# Patient Record
Sex: FEMALE | Race: WHITE | HISPANIC OR LATINO | Employment: UNEMPLOYED | URBAN - METROPOLITAN AREA
[De-identification: names, ages, dates, MRNs, and addresses within clinical notes are randomized per-mention and may not be internally consistent; named-entity substitution may affect disease eponyms.]

---

## 2019-10-01 PROBLEM — M47.817 LUMBOSACRAL SPONDYLOSIS WITHOUT MYELOPATHY: Status: ACTIVE | Noted: 2019-10-01

## 2019-10-01 PROBLEM — M77.32 HEEL SPUR, LEFT: Status: ACTIVE | Noted: 2019-10-01

## 2023-03-18 DIAGNOSIS — E11.9 TYPE 2 DIABETES MELLITUS WITHOUT COMPLICATION, WITH LONG-TERM CURRENT USE OF INSULIN (MULTI): Primary | ICD-10-CM

## 2023-03-18 DIAGNOSIS — Z79.4 TYPE 2 DIABETES MELLITUS WITHOUT COMPLICATION, WITH LONG-TERM CURRENT USE OF INSULIN (MULTI): Primary | ICD-10-CM

## 2023-03-20 PROBLEM — S22.000A COMPRESSION OF THORACIC VERTEBRA (MULTI): Status: ACTIVE | Noted: 2023-03-20

## 2023-03-20 PROBLEM — E66.9 OBESITY: Status: ACTIVE | Noted: 2023-03-20

## 2023-03-20 PROBLEM — R94.30 ABNORMAL RESULTS OF CARDIOVASCULAR FUNCTION STUDIES: Status: ACTIVE | Noted: 2023-03-20

## 2023-03-20 PROBLEM — M54.50 CHRONIC BILATERAL LOW BACK PAIN WITHOUT SCIATICA: Status: ACTIVE | Noted: 2023-03-20

## 2023-03-20 PROBLEM — I25.10 CAD (CORONARY ARTERY DISEASE): Status: ACTIVE | Noted: 2023-03-20

## 2023-03-20 PROBLEM — M19.90 OSTEOARTHRITIS: Status: ACTIVE | Noted: 2023-03-20

## 2023-03-20 PROBLEM — E78.5 HYPERLIPIDEMIA: Status: ACTIVE | Noted: 2023-03-20

## 2023-03-20 PROBLEM — E11.9 DIABETES MELLITUS (MULTI): Status: ACTIVE | Noted: 2023-03-20

## 2023-03-20 PROBLEM — G62.9 PERIPHERAL NEUROPATHY: Status: ACTIVE | Noted: 2023-03-20

## 2023-03-20 PROBLEM — R07.9 CHEST PAIN: Status: ACTIVE | Noted: 2023-03-20

## 2023-03-20 PROBLEM — R06.00 DYSPNEA: Status: ACTIVE | Noted: 2023-03-20

## 2023-03-20 PROBLEM — E11.40 DIABETIC NEUROPATHY (MULTI): Status: ACTIVE | Noted: 2023-03-20

## 2023-03-20 PROBLEM — I10 HYPERTENSION: Status: ACTIVE | Noted: 2023-03-20

## 2023-03-20 PROBLEM — M54.16 LUMBAR RADICULOPATHY: Status: ACTIVE | Noted: 2023-03-20

## 2023-03-20 PROBLEM — G89.29 CHRONIC BILATERAL LOW BACK PAIN WITHOUT SCIATICA: Status: ACTIVE | Noted: 2023-03-20

## 2023-03-20 PROBLEM — Z91.199 NONCOMPLIANCE: Status: ACTIVE | Noted: 2023-03-20

## 2023-03-20 PROBLEM — R93.1 ABNORMAL SCREENING CARDIAC CT: Status: ACTIVE | Noted: 2023-03-20

## 2023-03-20 PROBLEM — K27.9 PEPTIC ULCER: Status: ACTIVE | Noted: 2023-03-20

## 2023-03-20 RX ORDER — GLIMEPIRIDE 2 MG/1
1 TABLET ORAL DAILY
COMMUNITY
Start: 2019-05-20 | End: 2023-06-14

## 2023-03-20 RX ORDER — BLOOD SUGAR DIAGNOSTIC
STRIP MISCELLANEOUS
COMMUNITY
Start: 2019-06-03

## 2023-03-20 RX ORDER — PRAVASTATIN SODIUM 20 MG/1
1 TABLET ORAL NIGHTLY
COMMUNITY
End: 2024-01-29 | Stop reason: SDUPTHER

## 2023-03-20 RX ORDER — ATORVASTATIN CALCIUM 10 MG/1
1 TABLET, FILM COATED ORAL DAILY
COMMUNITY
End: 2023-04-04

## 2023-03-20 RX ORDER — PNV NO.95/FERROUS FUM/FOLIC AC 28MG-0.8MG
1 TABLET ORAL DAILY
COMMUNITY

## 2023-03-20 RX ORDER — GLIMEPIRIDE 2 MG/1
TABLET ORAL
Qty: 90 TABLET | Refills: 1 | OUTPATIENT
Start: 2023-03-20

## 2023-03-20 RX ORDER — NAPROXEN SODIUM 220 MG/1
1 TABLET, FILM COATED ORAL DAILY
COMMUNITY

## 2023-03-20 RX ORDER — LISINOPRIL 5 MG/1
1 TABLET ORAL 2 TIMES DAILY
COMMUNITY
Start: 2020-06-26 | End: 2023-05-16

## 2023-04-03 PROBLEM — M25.512 LEFT SHOULDER PAIN: Status: ACTIVE | Noted: 2023-04-03

## 2023-04-03 PROBLEM — S69.90XA WRIST INJURY: Status: ACTIVE | Noted: 2023-04-03

## 2023-04-03 RX ORDER — LANCETS
EACH MISCELLANEOUS
COMMUNITY

## 2023-04-04 ENCOUNTER — OFFICE VISIT (OUTPATIENT)
Dept: PRIMARY CARE | Facility: CLINIC | Age: 83
End: 2023-04-04
Payer: MEDICARE

## 2023-04-04 ENCOUNTER — LAB (OUTPATIENT)
Dept: LAB | Facility: LAB | Age: 83
End: 2023-04-04
Payer: MEDICARE

## 2023-04-04 VITALS — WEIGHT: 123.5 LBS | DIASTOLIC BLOOD PRESSURE: 66 MMHG | SYSTOLIC BLOOD PRESSURE: 118 MMHG | BODY MASS INDEX: 26.73 KG/M2

## 2023-04-04 DIAGNOSIS — E11.51 TYPE 2 DIABETES MELLITUS WITH DIABETIC PERIPHERAL ANGIOPATHY WITHOUT GANGRENE, WITHOUT LONG-TERM CURRENT USE OF INSULIN (MULTI): ICD-10-CM

## 2023-04-04 DIAGNOSIS — E83.51 HYPOCALCEMIA: ICD-10-CM

## 2023-04-04 DIAGNOSIS — M15.9 PRIMARY OSTEOARTHRITIS INVOLVING MULTIPLE JOINTS: ICD-10-CM

## 2023-04-04 DIAGNOSIS — G61.81 CHRONIC INFLAMMATORY DEMYELINATING POLYNEUROPATHY (MULTI): Primary | ICD-10-CM

## 2023-04-04 DIAGNOSIS — E84.8: ICD-10-CM

## 2023-04-04 DIAGNOSIS — G63: ICD-10-CM

## 2023-04-04 DIAGNOSIS — L30.9 ECZEMA, UNSPECIFIED TYPE: ICD-10-CM

## 2023-04-04 DIAGNOSIS — E11.49 OTHER DIABETIC NEUROLOGICAL COMPLICATION ASSOCIATED WITH TYPE 2 DIABETES MELLITUS (MULTI): ICD-10-CM

## 2023-04-04 DIAGNOSIS — I25.10 CORONARY ARTERY DISEASE INVOLVING NATIVE CORONARY ARTERY OF NATIVE HEART WITHOUT ANGINA PECTORIS: ICD-10-CM

## 2023-04-04 DIAGNOSIS — Z91.199 NONCOMPLIANCE: ICD-10-CM

## 2023-04-04 DIAGNOSIS — E78.41 ELEVATED LIPOPROTEIN(A): ICD-10-CM

## 2023-04-04 PROBLEM — M77.32 HEEL SPUR, LEFT: Status: ACTIVE | Noted: 2019-10-01

## 2023-04-04 PROBLEM — M47.817 LUMBOSACRAL SPONDYLOSIS WITHOUT MYELOPATHY: Status: ACTIVE | Noted: 2019-10-01

## 2023-04-04 LAB
ALANINE AMINOTRANSFERASE (SGPT) (U/L) IN SER/PLAS: 16 U/L (ref 7–45)
ALBUMIN (G/DL) IN SER/PLAS: 3.7 G/DL (ref 3.4–5)
ALKALINE PHOSPHATASE (U/L) IN SER/PLAS: 74 U/L (ref 33–136)
ANION GAP IN SER/PLAS: 12 MMOL/L (ref 10–20)
ASPARTATE AMINOTRANSFERASE (SGOT) (U/L) IN SER/PLAS: 24 U/L (ref 9–39)
BILIRUBIN TOTAL (MG/DL) IN SER/PLAS: 0.6 MG/DL (ref 0–1.2)
CALCIDIOL (25 OH VITAMIN D3) (NG/ML) IN SER/PLAS: 10 NG/ML
CALCIUM (MG/DL) IN SER/PLAS: 9.4 MG/DL (ref 8.6–10.3)
CARBON DIOXIDE, TOTAL (MMOL/L) IN SER/PLAS: 29 MMOL/L (ref 21–32)
CHLORIDE (MMOL/L) IN SER/PLAS: 103 MMOL/L (ref 98–107)
CHOLESTEROL (MG/DL) IN SER/PLAS: 143 MG/DL (ref 0–199)
CHOLESTEROL IN HDL (MG/DL) IN SER/PLAS: 42.5 MG/DL
CHOLESTEROL/HDL RATIO: 3.4
CREATININE (MG/DL) IN SER/PLAS: 0.73 MG/DL (ref 0.5–1.05)
ESTIMATED AVERAGE GLUCOSE FOR HBA1C: 189 MG/DL
GFR FEMALE: 82 ML/MIN/1.73M2
GLUCOSE (MG/DL) IN SER/PLAS: 147 MG/DL (ref 74–99)
HEMOGLOBIN A1C/HEMOGLOBIN TOTAL IN BLOOD: 8.2 %
LDL: 79 MG/DL (ref 0–99)
POTASSIUM (MMOL/L) IN SER/PLAS: 4.3 MMOL/L (ref 3.5–5.3)
PROTEIN TOTAL: 6.7 G/DL (ref 6.4–8.2)
SODIUM (MMOL/L) IN SER/PLAS: 140 MMOL/L (ref 136–145)
THYROTROPIN (MIU/L) IN SER/PLAS BY DETECTION LIMIT <= 0.05 MIU/L: 1.24 MIU/L (ref 0.44–3.98)
TRIGLYCERIDE (MG/DL) IN SER/PLAS: 106 MG/DL (ref 0–149)
UREA NITROGEN (MG/DL) IN SER/PLAS: 17 MG/DL (ref 6–23)
VLDL: 21 MG/DL (ref 0–40)

## 2023-04-04 PROCEDURE — 83036 HEMOGLOBIN GLYCOSYLATED A1C: CPT

## 2023-04-04 PROCEDURE — 99213 OFFICE O/P EST LOW 20 MIN: CPT | Performed by: FAMILY MEDICINE

## 2023-04-04 PROCEDURE — 82570 ASSAY OF URINE CREATININE: CPT

## 2023-04-04 PROCEDURE — 80053 COMPREHEN METABOLIC PANEL: CPT

## 2023-04-04 PROCEDURE — 3078F DIAST BP <80 MM HG: CPT | Performed by: FAMILY MEDICINE

## 2023-04-04 PROCEDURE — 36415 COLL VENOUS BLD VENIPUNCTURE: CPT

## 2023-04-04 PROCEDURE — 1159F MED LIST DOCD IN RCRD: CPT | Performed by: FAMILY MEDICINE

## 2023-04-04 PROCEDURE — 84443 ASSAY THYROID STIM HORMONE: CPT

## 2023-04-04 PROCEDURE — 1160F RVW MEDS BY RX/DR IN RCRD: CPT | Performed by: FAMILY MEDICINE

## 2023-04-04 PROCEDURE — 82306 VITAMIN D 25 HYDROXY: CPT

## 2023-04-04 PROCEDURE — 80061 LIPID PANEL: CPT

## 2023-04-04 PROCEDURE — 82043 UR ALBUMIN QUANTITATIVE: CPT

## 2023-04-04 PROCEDURE — 3074F SYST BP LT 130 MM HG: CPT | Performed by: FAMILY MEDICINE

## 2023-04-04 RX ORDER — BETAMETHASONE VALERATE 1 MG/G
CREAM TOPICAL 2 TIMES DAILY
Qty: 45 G | Refills: 5 | Status: SHIPPED | OUTPATIENT
Start: 2023-04-04 | End: 2024-04-03

## 2023-04-04 ASSESSMENT — ENCOUNTER SYMPTOMS
LIGHT-HEADEDNESS: 1
MUSCULOSKELETAL NEGATIVE: 1
FREQUENCY: 1
CONSTITUTIONAL NEGATIVE: 1
GASTROINTESTINAL NEGATIVE: 1
CARDIOVASCULAR NEGATIVE: 1
RESPIRATORY NEGATIVE: 1
ENDOCRINE NEGATIVE: 1
CONFUSION: 1
ALLERGIC/IMMUNOLOGIC NEGATIVE: 1

## 2023-04-04 NOTE — PROGRESS NOTES
Subjective   Reason for Visit: Marci Han is an 82 y.o. female here for a Medicare Wellness visit.     Past Medical, Surgical, and Family History reviewed and updated in chart.    Reviewed all medications by prescribing practitioner or clinical pharmacist (such as prescriptions, OTCs, herbal therapies and supplements) and documented in the medical record.    RO        Patient Care Team:  Frances Azar MD as PCP - [facility]  Frances Azar MD as PCP - [employer] PCP     Review of Systems   Constitutional: Negative.    HENT: Negative.     Eyes:  Positive for visual disturbance.   Respiratory: Negative.     Cardiovascular: Negative.    Gastrointestinal: Negative.    Endocrine: Negative.    Genitourinary:  Positive for frequency and urgency.   Musculoskeletal: Negative.    Skin:  Rash: Ext. Wound: rt ankle.   Allergic/Immunologic: Negative.    Neurological:  Positive for light-headedness.   Psychiatric/Behavioral:  Positive for confusion. Negative for behavioral problems.        Objective   Vitals:  /66 (BP Location: Right arm, Patient Position: Sitting)   Wt 56 kg (123 lb 8 oz)   BMI 26.73 kg/m²       Physical Exam  Constitutional:       Appearance: Normal appearance.   HENT:      Head: Normocephalic and atraumatic.      Nose: Nose normal.      Mouth/Throat:      Mouth: Mucous membranes are moist.   Cardiovascular:      Rate and Rhythm: Normal rate and regular rhythm.   Pulmonary:      Effort: Pulmonary effort is normal.   Musculoskeletal:         General: Normal range of motion.      Cervical back: Normal range of motion and neck supple.   Skin:     General: Skin is warm.      Findings: Rash: EXT.   Neurological:      General: No focal deficit present.      Mental Status: She is alert and oriented to person, place, and time.   Psychiatric:         Mood and Affect: Mood normal.         Behavior: Behavior normal.         Assessment/Plan   Problem List Items Addressed This  Visit       CAD (coronary artery disease)    Diabetes mellitus (CMS/HCC)    Diabetic neuropathy (CMS/HCC)    Hyperlipidemia    Noncompliance    Osteoarthritis    Peripheral neuropathy - Primary

## 2023-04-04 NOTE — PATIENT INSTRUCTIONS
Contd meds , diet , daily X's ,monitor BP/BS daily , FUWOD's , oph / pod , labs , Rx , discussed with daughter,., tcb x 1wk , refuses mamogram , routine skin care,. Will do neuro eval next visit @ lab reports

## 2023-04-05 LAB
ALBUMIN (MG/L) IN URINE: 7.1 MG/L
ALBUMIN/CREATININE (UG/MG) IN URINE: 6 UG/MG CRT (ref 0–30)
CREATININE (MG/DL) IN URINE: 118 MG/DL (ref 20–320)

## 2023-04-06 DIAGNOSIS — E11.51 TYPE 2 DIABETES MELLITUS WITH DIABETIC PERIPHERAL ANGIOPATHY WITHOUT GANGRENE, WITHOUT LONG-TERM CURRENT USE OF INSULIN (MULTI): Primary | ICD-10-CM

## 2023-04-06 DIAGNOSIS — E55.9 VITAMIN D DEFICIENCY: Primary | ICD-10-CM

## 2023-04-06 RX ORDER — ERGOCALCIFEROL 1.25 MG/1
50000 CAPSULE ORAL
Qty: 12 CAPSULE | Refills: 0 | Status: SHIPPED | OUTPATIENT
Start: 2023-04-06 | End: 2023-05-04 | Stop reason: ALTCHOICE

## 2023-05-04 ENCOUNTER — OFFICE VISIT (OUTPATIENT)
Dept: PRIMARY CARE | Facility: CLINIC | Age: 83
End: 2023-05-04
Payer: MEDICARE

## 2023-05-04 VITALS
HEIGHT: 59 IN | DIASTOLIC BLOOD PRESSURE: 58 MMHG | WEIGHT: 123 LBS | SYSTOLIC BLOOD PRESSURE: 126 MMHG | BODY MASS INDEX: 24.8 KG/M2

## 2023-05-04 DIAGNOSIS — I25.10 CORONARY ARTERY DISEASE INVOLVING NATIVE CORONARY ARTERY OF NATIVE HEART WITHOUT ANGINA PECTORIS: ICD-10-CM

## 2023-05-04 DIAGNOSIS — E11.51 TYPE 2 DIABETES MELLITUS WITH DIABETIC PERIPHERAL ANGIOPATHY WITHOUT GANGRENE, WITHOUT LONG-TERM CURRENT USE OF INSULIN (MULTI): ICD-10-CM

## 2023-05-04 DIAGNOSIS — Z00.00 HEALTHCARE MAINTENANCE: ICD-10-CM

## 2023-05-04 DIAGNOSIS — E11.43 DIABETIC AUTONOMIC NEUROPATHY ASSOCIATED WITH TYPE 2 DIABETES MELLITUS (MULTI): Primary | ICD-10-CM

## 2023-05-04 DIAGNOSIS — E66.09 CLASS 1 OBESITY DUE TO EXCESS CALORIES IN ADULT, UNSPECIFIED BMI, UNSPECIFIED WHETHER SERIOUS COMORBIDITY PRESENT: ICD-10-CM

## 2023-05-04 DIAGNOSIS — I10 PRIMARY HYPERTENSION: ICD-10-CM

## 2023-05-04 DIAGNOSIS — E78.41 ELEVATED LIPOPROTEIN(A): ICD-10-CM

## 2023-05-04 DIAGNOSIS — M15.9 PRIMARY OSTEOARTHRITIS INVOLVING MULTIPLE JOINTS: ICD-10-CM

## 2023-05-04 DIAGNOSIS — G61.81 CHRONIC INFLAMMATORY DEMYELINATING POLYNEUROPATHY (MULTI): ICD-10-CM

## 2023-05-04 DIAGNOSIS — Z12.31 ENCOUNTER FOR SCREENING MAMMOGRAM FOR MALIGNANT NEOPLASM OF BREAST: ICD-10-CM

## 2023-05-04 PROCEDURE — 1159F MED LIST DOCD IN RCRD: CPT | Performed by: FAMILY MEDICINE

## 2023-05-04 PROCEDURE — 99213 OFFICE O/P EST LOW 20 MIN: CPT | Performed by: FAMILY MEDICINE

## 2023-05-04 PROCEDURE — 3078F DIAST BP <80 MM HG: CPT | Performed by: FAMILY MEDICINE

## 2023-05-04 PROCEDURE — 1160F RVW MEDS BY RX/DR IN RCRD: CPT | Performed by: FAMILY MEDICINE

## 2023-05-04 PROCEDURE — 1036F TOBACCO NON-USER: CPT | Performed by: FAMILY MEDICINE

## 2023-05-04 PROCEDURE — 3074F SYST BP LT 130 MM HG: CPT | Performed by: FAMILY MEDICINE

## 2023-05-04 RX ORDER — ENALAPRIL MALEATE 5 MG/1
TABLET ORAL
COMMUNITY
Start: 2018-09-07 | End: 2023-07-06 | Stop reason: ALTCHOICE

## 2023-05-04 ASSESSMENT — ENCOUNTER SYMPTOMS
NUMBNESS: 1
ENDOCRINE NEGATIVE: 1
RESPIRATORY NEGATIVE: 1
FREQUENCY: 1
BACK PAIN: 1
CARDIOVASCULAR NEGATIVE: 1
PSYCHIATRIC NEGATIVE: 1
WEAKNESS: 1
CONSTITUTIONAL NEGATIVE: 1
GASTROINTESTINAL NEGATIVE: 1
ALLERGIC/IMMUNOLOGIC NEGATIVE: 1
LIGHT-HEADEDNESS: 1
ARTHRALGIAS: 1
HEMATOLOGIC/LYMPHATIC NEGATIVE: 1

## 2023-05-04 NOTE — PROGRESS NOTES
"Subjective   Patient ID: Marci Han is a 83 y.o. female who presents for Follow-up (Patient presented today for medication management.).    ROV         Review of Systems   Constitutional: Negative.    HENT: Negative.     Eyes:  Positive for visual disturbance.   Respiratory: Negative.     Cardiovascular: Negative.    Gastrointestinal: Negative.    Endocrine: Negative.    Genitourinary:  Positive for frequency.   Musculoskeletal:  Positive for arthralgias and back pain.   Skin: Negative.    Allergic/Immunologic: Negative.    Neurological:  Positive for weakness, light-headedness and numbness.   Hematological: Negative.    Psychiatric/Behavioral: Negative.         Objective   /58   Ht 1.499 m (4' 11\")   Wt 55.8 kg (123 lb)   BMI 24.84 kg/m²     Physical Exam  HENT:      Head: Normocephalic.      Nose: Nose normal.   Eyes:      Extraocular Movements: Extraocular movements intact.      Pupils: Pupils are equal, round, and reactive to light.   Cardiovascular:      Rate and Rhythm: Normal rate and regular rhythm.   Pulmonary:      Effort: Pulmonary effort is normal.   Abdominal:      Palpations: Abdomen is soft.   Musculoskeletal:         General: Normal range of motion.      Cervical back: Normal range of motion and neck supple.   Skin:     Findings: Rash: eczema.   Neurological:      General: No focal deficit present.      Mental Status: She is alert and oriented to person, place, and time.   Psychiatric:         Mood and Affect: Mood normal.         Behavior: Behavior normal.         Assessment/Plan          "

## 2023-05-04 NOTE — PATIENT INSTRUCTIONS
Contd meds , diet ,daily x's tcb x 1wk , rto x 4m was seen by Dr. Casper, discussed  with daughter , monitor BP/BS daily , shringrix, , had  cologuard,follow up with [facility] , OPH / POD ,  
Statement Selected

## 2023-05-15 DIAGNOSIS — I10 PRIMARY HYPERTENSION: Primary | ICD-10-CM

## 2023-05-16 RX ORDER — LISINOPRIL 5 MG/1
TABLET ORAL
Qty: 180 TABLET | Refills: 3 | Status: SHIPPED | OUTPATIENT
Start: 2023-05-16 | End: 2024-01-29 | Stop reason: SDUPTHER

## 2023-06-10 DIAGNOSIS — E11.51 TYPE 2 DIABETES MELLITUS WITH DIABETIC PERIPHERAL ANGIOPATHY WITHOUT GANGRENE, WITHOUT LONG-TERM CURRENT USE OF INSULIN (MULTI): Primary | ICD-10-CM

## 2023-06-14 RX ORDER — GLIMEPIRIDE 2 MG/1
TABLET ORAL
Qty: 30 TABLET | Refills: 0 | Status: SHIPPED | OUTPATIENT
Start: 2023-06-14 | End: 2024-01-29 | Stop reason: SDUPTHER

## 2023-07-06 ENCOUNTER — OFFICE VISIT (OUTPATIENT)
Dept: PRIMARY CARE | Facility: CLINIC | Age: 83
End: 2023-07-06
Payer: MEDICARE

## 2023-07-06 VITALS
WEIGHT: 130.8 LBS | HEIGHT: 59 IN | DIASTOLIC BLOOD PRESSURE: 76 MMHG | RESPIRATION RATE: 18 BRPM | OXYGEN SATURATION: 96 % | HEART RATE: 72 BPM | BODY MASS INDEX: 26.37 KG/M2 | SYSTOLIC BLOOD PRESSURE: 136 MMHG

## 2023-07-06 DIAGNOSIS — I10 ESSENTIAL HYPERTENSION: Primary | ICD-10-CM

## 2023-07-06 DIAGNOSIS — L30.9 ECZEMA, UNSPECIFIED TYPE: ICD-10-CM

## 2023-07-06 DIAGNOSIS — E53.8 VITAMIN B12 DEFICIENCY: ICD-10-CM

## 2023-07-06 DIAGNOSIS — M85.89 OSTEOPENIA OF MULTIPLE SITES: ICD-10-CM

## 2023-07-06 DIAGNOSIS — E55.9 VITAMIN D DEFICIENCY: ICD-10-CM

## 2023-07-06 DIAGNOSIS — M15.9 PRIMARY OSTEOARTHRITIS INVOLVING MULTIPLE JOINTS: ICD-10-CM

## 2023-07-06 DIAGNOSIS — Z13.820 SCREENING FOR OSTEOPOROSIS: ICD-10-CM

## 2023-07-06 DIAGNOSIS — R41.89 COGNITIVE IMPAIRMENT: ICD-10-CM

## 2023-07-06 DIAGNOSIS — R26.0 ATAXIC GAIT: ICD-10-CM

## 2023-07-06 DIAGNOSIS — E11.65 TYPE 2 DIABETES MELLITUS WITH HYPERGLYCEMIA, WITHOUT LONG-TERM CURRENT USE OF INSULIN (MULTI): ICD-10-CM

## 2023-07-06 DIAGNOSIS — R01.1 HEART MURMUR, SYSTOLIC: ICD-10-CM

## 2023-07-06 DIAGNOSIS — E78.00 PURE HYPERCHOLESTEROLEMIA WITH TARGET LOW DENSITY LIPOPROTEIN (LDL) CHOLESTEROL LESS THAN 70 MG/DL: ICD-10-CM

## 2023-07-06 PROCEDURE — 1159F MED LIST DOCD IN RCRD: CPT | Performed by: INTERNAL MEDICINE

## 2023-07-06 PROCEDURE — 3075F SYST BP GE 130 - 139MM HG: CPT | Performed by: INTERNAL MEDICINE

## 2023-07-06 PROCEDURE — 1160F RVW MEDS BY RX/DR IN RCRD: CPT | Performed by: INTERNAL MEDICINE

## 2023-07-06 PROCEDURE — 3078F DIAST BP <80 MM HG: CPT | Performed by: INTERNAL MEDICINE

## 2023-07-06 PROCEDURE — 99214 OFFICE O/P EST MOD 30 MIN: CPT | Performed by: INTERNAL MEDICINE

## 2023-07-06 PROCEDURE — 1036F TOBACCO NON-USER: CPT | Performed by: INTERNAL MEDICINE

## 2023-07-06 NOTE — PROGRESS NOTES
NEW PATIENTSubjective   Patient ID: Marci Han is a 83 y.o. female who presents for New Patient Visit.    HPI   83-year-old female here for primary care.  Last seen by PCP Dr. Azar April 2023, with corresponding fasting laboratory examinations.  Has been compliant with medications, tolerating regimens, and seemingly doing well.    The daughter is in attendance, helping with interval history.  Points out that her mother has become a little bit more forgetful perhaps.  Also, she has had some episodes of falling over the past year.  Still, she seemingly remains capable of taking care of herself, including most instrumental activities, but not driving.  She lives with a male  who does help with the cooking and the cleaning, and they seem to be managing well overall.    The patient herself is only mildly worried about her memory perhaps.  Otherwise, she has no particular complaints.  She tries to eat sensibly.  She apparently has had good control of blood sugar.  ENDOCRINE with no polyuria, polydipsia, polyphagia.  No blurred vision.  No skin, hair, nail changes.  No dramatic weight loss or weight gain.      Likewise, no recent falls.  She does acknowledge that her memory might be not what it used to be, but otherwise, she is only mildly anxious, not depressive, and does want to improve quality of life.  Does not wish harm to self or others.  Does want to get better.    Seen by ENDOCRINOLOGY, Dr. Casper, as well as CARDIOLOGY, Dr. Whitmore.  Compliant with medications, tolerating regimens.  Physically active, but does admit that she could do more.  No ashish substernal chest pain.  No orthopnea, no paroxysmal nocturnal dyspnea.  Careful about exposure.  No coughing, no sputum production.  Appetite preserved, with no nausea, vomiting, abdominal distress.  No diarrhea, no constipation.  No apparent blood in stool.  No apparent weight loss.  No dysuria, flank, suprapubic pain.  No discharge, no pruritus.  No  "rash.  No malodor.  No hesitancy, no feeling of incomplete voiding.  No skin changes, rashes, pruritus, jaundice.  No easy bruisability.  CONSTITUTIONALLY, no fever, no chills.  No night sweats.  No lingering anorexia or nausea.  No apparent lymphadenopathy.  No apparent weight loss.          Review of Systems  Review of systems as in history of present illness, and otherwise, reviewed separately as well, and was unremarkable/negative/noncontributory.          Objective   /76 (BP Location: Left arm, Patient Position: Sitting, BP Cuff Size: Adult)   Pulse 72   Resp 18   Ht 1.499 m (4' 11\")   Wt 59.3 kg (130 lb 12.8 oz)   SpO2 96%   BMI 26.42 kg/m²     Physical Exam  In good spirits.  Not in distress or diaphoresis.  Receptive, seemingly oriented x3.  Amiable.  Not unkempt.  Moderately built.  Seemingly appropriate.  Mildly anxious, perhaps minimally hard of hearing, but coping well.  Daughter, Suyapa, here to make sure that we understand each other, and that she does not miss any information that we discussed.  The patient does seem to want to improve quality of life.  She does not seem to want to harm herself or anybody else.  Looking forward to staying healthy and feeling better.    HEAD pink palpebral conjunctivae, anicteric sclerae.  Mucous membranes moist.  No tonsillopharyngeal congestion, no thrush.  NECK supple, no apparent jugular venous distention.  No carotid bruit.  CARDIOVASCULAR not in distress or diaphoresis.  No bipedal edema.  Regular rate and rhythm.  Soft systolic murmur heard best along the second intercostal space, right sternal border.  LUNGS not in distress or diaphoresis.  Not using accessory muscles.  Clear to auscultation bilaterally.  ABDOMEN soft, nontender.  BACK no costovertebral angle tenderness.  EXTREMITIES no clubbing, no cyanosis.  SKIN with some superficial excoriation over some scaling.  Patient did point out that she does have history of ECZEMA, but had not been always " compliant with use of steroid cream.  NEURO no facial asymmetry.  No apparent cranial nerve deficits.  Romberg negative.  Ambulating without need of assistance.  No apparent focal weakness.  No tremors.  PSYCH receptive, appropriate, and eager to maintain and improve quality of life.        LABORATORY examinations reviewed with patient and daughter.          Assessment/Plan   Problem List Items Addressed This Visit       Vitamin D deficiency    Relevant Orders    XR DEXA bone density    Vitamin B12 deficiency    Relevant Orders    CBC and Auto Differential    Vitamin B12    Folate    Pure hypercholesterolemia with target low density lipoprotein (LDL) cholesterol less than 70 mg/dL    Relevant Orders    Comprehensive Metabolic Panel    Lipid Panel    Osteoarthritis    Relevant Orders    Uric Acid    Creatine Kinase    XR DEXA bone density    Heart murmur, systolic    Relevant Orders    CBC and Auto Differential    Essential hypertension - Primary    Relevant Orders    CBC and Auto Differential    Urinalysis with Reflex Microscopic and Culture    Comprehensive Metabolic Panel    TSH with reflex to Free T4 if abnormal    Magnesium    Creatine Kinase    Eczema    Diabetes mellitus (CMS/HCC)    Relevant Orders    CBC and Auto Differential    Urinalysis with Reflex Microscopic and Culture    Comprehensive Metabolic Panel    Hemoglobin A1C    Albumin , Urine Random    Cognitive impairment    Relevant Orders    CBC and Auto Differential    Urinalysis with Reflex Microscopic and Culture    RPR    TSH with reflex to Free T4 if abnormal    Vitamin B12    Folate    Ataxic gait    Relevant Orders    Urinalysis with Reflex Microscopic and Culture    Vitamin B12    Folate    XR DEXA bone density     Other Visit Diagnoses       Osteopenia of multiple sites        Relevant Orders    XR DEXA bone density    Screening for osteoporosis        Relevant Orders    XR DEXA bone density             Thank you very much for coming.  It is a  pleasure to meet you and your daughter, Suyapa.  Thank you for visiting me.    You are due for FASTING laboratory examinations, which can be done at [facility]/ in [place].  Please have these done soon, so that I can call and let you know how you are doing!    Likewise, please come back in 6 weeks, so that we can do your Medicare Wellness evaluation, and review how you have been doing, and what we can do to preserve your health, and improve your quality of life!    In the meantime, I have been able to evaluate you.  Your balance seems to be okay at this time.  Your joints are not stiff.  Your lungs are clear.  Your heart sounds are regular.  You do have a mild turbulence or murmur in your heart, but it is of no consequence at this time.  Still, in the future, we will talk about perhaps doing an ultrasound of your heart.    Please keep your appointment with Dr. Whitmore, all Cardiology.  He might even order the ultrasound of your heart if he is impressed.  Otherwise, we will take care of it.  He will be very happy to see that you have done your FASTING blood examinations, and he may review the results with you.    You will be due to see ENDOCRINOLOGY, Dr. Casper, in OCTOBER.  Until then, let me take care of your diabetes.    You will benefit from a BONE DENSITY test, especially since you are at risk for fracture.  This is a good idea to get done sometime soon.  Also, with your history of DIABETES, it is best for you to see a foot doctor or PODIATRIST.    We reviewed your medications, and you are on very good regimens.  Please add a MULTIVITAMIN, Centrum Silver.  Take this with BREAKFAST every day, because it will keep you alert and give you energy.    Likewise, in the meantime, please continue staying physically active, especially with dancing!  Please continue eating sensibly.  Please continue drinking lots of fluids throughout the day.  Please continue praying for Ladonna, and please continue to pray for our recovery from  this pandemic.    Again, it is a pleasure to meet you both.  Please continue to take care of each other.  Please do not hesitate to call with questions or concerns.  I will call you with results and possible changes.  Do not worry about your memory.  I will also investigate what we can do to improve and preserve your overall function!    Please come back in 6 weeks.  Until then, I hope you are enjoying your summer.            0  Return in 6 weeks.  40 minutes please.  Medicare Wellness evaluation.  Review preventive strategies, cardiovascular risk, laboratory results.  Coordinate with cardiology, possibly podiatry, ophthalmology.  Consider 2D echocardiogram.  Reassess cognition, function.            0

## 2023-07-06 NOTE — PATIENT INSTRUCTIONS
Thank you very much for coming.  It is a pleasure to meet you and your daughter, Suyapa.  Thank you for visiting me.    You are due for FASTING laboratory examinations, which can be done at [facility]/ in [place].  Please have these done soon, so that I can call and let you know how you are doing!    Likewise, please come back in 6 weeks, so that we can do your Medicare Wellness evaluation, and review how you have been doing, and what we can do to preserve your health, and improve your quality of life!    In the meantime, I have been able to evaluate you.  Your balance seems to be okay at this time.  Your joints are not stiff.  Your lungs are clear.  Your heart sounds are regular.  You do have a mild turbulence or murmur in your heart, but it is of no consequence at this time.  Still, in the future, we will talk about perhaps doing an ultrasound of your heart.    Please keep your appointment with Dr. Whitmore, all Cardiology.  He might even order the ultrasound of your heart if he is impressed.  Otherwise, we will take care of it.  He will be very happy to see that you have done your FASTING blood examinations, and he may review the results with you.    You will be due to see ENDOCRINOLOGY, Dr. Casper, in OCTOBER.  Until then, let me take care of your diabetes.    You will benefit from a BONE DENSITY test, especially since you are at risk for fracture.  This is a good idea to get done sometime soon.  Also, with your history of DIABETES, it is best for you to see a foot doctor or PODIATRIST.    We reviewed your medications, and you are on very good regimens.  Please add a MULTIVITAMIN, Centrum Silver.  Take this with BREAKFAST every day, because it will keep you alert and give you energy.    Likewise, in the meantime, please continue staying physically active, especially with dancing!  Please continue eating sensibly.  Please continue drinking lots of fluids throughout the day.  Please continue praying for Ladonna, and please  continue to pray for our recovery from this pandemic.    Again, it is a pleasure to meet you both.  Please continue to take care of each other.  Please do not hesitate to call with questions or concerns.  I will call you with results and possible changes.  Do not worry about your memory.  I will also investigate what we can do to improve and preserve your overall function!    Please come back in 6 weeks.  Until then, I hope you are enjoying your summer.            0  Return in 6 weeks.  40 minutes please.  Medicare Wellness evaluation.  Review preventive strategies, cardiovascular risk, laboratory results.  Coordinate with cardiology, possibly podiatry, ophthalmology.  Consider 2D echocardiogram.  Reassess cognition, function.            0

## 2023-07-17 ENCOUNTER — LAB (OUTPATIENT)
Dept: LAB | Facility: LAB | Age: 83
End: 2023-07-17
Payer: MEDICARE

## 2023-07-17 DIAGNOSIS — E78.00 PURE HYPERCHOLESTEROLEMIA WITH TARGET LOW DENSITY LIPOPROTEIN (LDL) CHOLESTEROL LESS THAN 70 MG/DL: ICD-10-CM

## 2023-07-17 DIAGNOSIS — E53.8 VITAMIN B12 DEFICIENCY: ICD-10-CM

## 2023-07-17 DIAGNOSIS — M15.9 PRIMARY OSTEOARTHRITIS INVOLVING MULTIPLE JOINTS: ICD-10-CM

## 2023-07-17 DIAGNOSIS — R26.0 ATAXIC GAIT: ICD-10-CM

## 2023-07-17 DIAGNOSIS — E11.65 TYPE 2 DIABETES MELLITUS WITH HYPERGLYCEMIA, WITHOUT LONG-TERM CURRENT USE OF INSULIN (MULTI): ICD-10-CM

## 2023-07-17 DIAGNOSIS — I10 ESSENTIAL HYPERTENSION: ICD-10-CM

## 2023-07-17 DIAGNOSIS — R01.1 HEART MURMUR, SYSTOLIC: ICD-10-CM

## 2023-07-17 DIAGNOSIS — N30.00 ACUTE CYSTITIS WITHOUT HEMATURIA: Primary | ICD-10-CM

## 2023-07-17 DIAGNOSIS — R41.89 COGNITIVE IMPAIRMENT: ICD-10-CM

## 2023-07-17 LAB
ALANINE AMINOTRANSFERASE (SGPT) (U/L) IN SER/PLAS: 14 U/L (ref 7–45)
ALBUMIN (G/DL) IN SER/PLAS: 3.8 G/DL (ref 3.4–5)
ALBUMIN (MG/L) IN URINE: 8.6 MG/L
ALBUMIN/CREATININE (UG/MG) IN URINE: 8.8 UG/MG CRT (ref 0–30)
ALKALINE PHOSPHATASE (U/L) IN SER/PLAS: 70 U/L (ref 33–136)
ANION GAP IN SER/PLAS: 10 MMOL/L (ref 10–20)
APPEARANCE, URINE: CLEAR
ASPARTATE AMINOTRANSFERASE (SGOT) (U/L) IN SER/PLAS: 23 U/L (ref 9–39)
BACTERIA, URINE: ABNORMAL /HPF
BASOPHILS (10*3/UL) IN BLOOD BY AUTOMATED COUNT: 0.03 X10E9/L (ref 0–0.1)
BASOPHILS/100 LEUKOCYTES IN BLOOD BY AUTOMATED COUNT: 0.6 % (ref 0–2)
BILIRUBIN TOTAL (MG/DL) IN SER/PLAS: 0.4 MG/DL (ref 0–1.2)
BILIRUBIN, URINE: NEGATIVE
BLOOD, URINE: NEGATIVE
CALCIUM (MG/DL) IN SER/PLAS: 9.4 MG/DL (ref 8.6–10.3)
CARBON DIOXIDE, TOTAL (MMOL/L) IN SER/PLAS: 31 MMOL/L (ref 21–32)
CHLORIDE (MMOL/L) IN SER/PLAS: 103 MMOL/L (ref 98–107)
CHOLESTEROL (MG/DL) IN SER/PLAS: 149 MG/DL (ref 0–199)
CHOLESTEROL IN HDL (MG/DL) IN SER/PLAS: 45.8 MG/DL
CHOLESTEROL/HDL RATIO: 3.3
COBALAMIN (VITAMIN B12) (PG/ML) IN SER/PLAS: 596 PG/ML (ref 211–911)
COLOR, URINE: YELLOW
CREATINE KINASE (U/L) IN SER/PLAS: 92 U/L (ref 0–215)
CREATININE (MG/DL) IN SER/PLAS: 0.86 MG/DL (ref 0.5–1.05)
CREATININE (MG/DL) IN URINE: 98 MG/DL (ref 20–320)
EOSINOPHILS (10*3/UL) IN BLOOD BY AUTOMATED COUNT: 0.13 X10E9/L (ref 0–0.4)
EOSINOPHILS/100 LEUKOCYTES IN BLOOD BY AUTOMATED COUNT: 2.6 % (ref 0–6)
ERYTHROCYTE DISTRIBUTION WIDTH (RATIO) BY AUTOMATED COUNT: 15 % (ref 11.5–14.5)
ERYTHROCYTE MEAN CORPUSCULAR HEMOGLOBIN CONCENTRATION (G/DL) BY AUTOMATED: 31.3 G/DL (ref 32–36)
ERYTHROCYTE MEAN CORPUSCULAR VOLUME (FL) BY AUTOMATED COUNT: 96 FL (ref 80–100)
ERYTHROCYTES (10*6/UL) IN BLOOD BY AUTOMATED COUNT: 3.84 X10E12/L (ref 4–5.2)
ESTIMATED AVERAGE GLUCOSE FOR HBA1C: 163 MG/DL
FOLATE (NG/ML) IN SER/PLAS: 11.3 NG/ML
GFR FEMALE: 67 ML/MIN/1.73M2
GLUCOSE (MG/DL) IN SER/PLAS: 121 MG/DL (ref 74–99)
GLUCOSE, URINE: NEGATIVE MG/DL
HEMATOCRIT (%) IN BLOOD BY AUTOMATED COUNT: 36.8 % (ref 36–46)
HEMOGLOBIN (G/DL) IN BLOOD: 11.5 G/DL (ref 12–16)
HEMOGLOBIN A1C/HEMOGLOBIN TOTAL IN BLOOD: 7.3 %
IMMATURE GRANULOCYTES/100 LEUKOCYTES IN BLOOD BY AUTOMATED COUNT: 0.2 % (ref 0–0.9)
KETONES, URINE: NEGATIVE MG/DL
LDL: 84 MG/DL (ref 0–99)
LEUKOCYTE ESTERASE, URINE: ABNORMAL
LEUKOCYTES (10*3/UL) IN BLOOD BY AUTOMATED COUNT: 4.9 X10E9/L (ref 4.4–11.3)
LYMPHOCYTES (10*3/UL) IN BLOOD BY AUTOMATED COUNT: 1.55 X10E9/L (ref 0.8–3)
LYMPHOCYTES/100 LEUKOCYTES IN BLOOD BY AUTOMATED COUNT: 31.4 % (ref 13–44)
MAGNESIUM (MG/DL) IN SER/PLAS: 1.6 MG/DL (ref 1.6–2.4)
MONOCYTES (10*3/UL) IN BLOOD BY AUTOMATED COUNT: 0.33 X10E9/L (ref 0.05–0.8)
MONOCYTES/100 LEUKOCYTES IN BLOOD BY AUTOMATED COUNT: 6.7 % (ref 2–10)
MUCUS, URINE: ABNORMAL /LPF
NEUTROPHILS (10*3/UL) IN BLOOD BY AUTOMATED COUNT: 2.88 X10E9/L (ref 1.6–5.5)
NEUTROPHILS/100 LEUKOCYTES IN BLOOD BY AUTOMATED COUNT: 58.5 % (ref 40–80)
NITRITE, URINE: POSITIVE
PH, URINE: 5 (ref 5–8)
PLATELETS (10*3/UL) IN BLOOD AUTOMATED COUNT: 197 X10E9/L (ref 150–450)
POTASSIUM (MMOL/L) IN SER/PLAS: 4.3 MMOL/L (ref 3.5–5.3)
PROTEIN TOTAL: 6.6 G/DL (ref 6.4–8.2)
PROTEIN, URINE: NEGATIVE MG/DL
RBC, URINE: 1 /HPF (ref 0–5)
SODIUM (MMOL/L) IN SER/PLAS: 140 MMOL/L (ref 136–145)
SPECIFIC GRAVITY, URINE: 1.02 (ref 1–1.03)
SQUAMOUS EPITHELIAL CELLS, URINE: <1 /HPF
THYROTROPIN (MIU/L) IN SER/PLAS BY DETECTION LIMIT <= 0.05 MIU/L: 2.9 MIU/L (ref 0.44–3.98)
TRIGLYCERIDE (MG/DL) IN SER/PLAS: 94 MG/DL (ref 0–149)
URATE (MG/DL) IN SER/PLAS: 6.3 MG/DL (ref 2.3–6.7)
UREA NITROGEN (MG/DL) IN SER/PLAS: 27 MG/DL (ref 6–23)
UROBILINOGEN, URINE: <2 MG/DL (ref 0–1.9)
VLDL: 19 MG/DL (ref 0–40)
WBC, URINE: 24 /HPF (ref 0–5)

## 2023-07-17 PROCEDURE — 85025 COMPLETE CBC W/AUTO DIFF WBC: CPT

## 2023-07-17 PROCEDURE — 84443 ASSAY THYROID STIM HORMONE: CPT

## 2023-07-17 PROCEDURE — 82043 UR ALBUMIN QUANTITATIVE: CPT

## 2023-07-17 PROCEDURE — 86592 SYPHILIS TEST NON-TREP QUAL: CPT

## 2023-07-17 PROCEDURE — 80061 LIPID PANEL: CPT

## 2023-07-17 PROCEDURE — 81001 URINALYSIS AUTO W/SCOPE: CPT

## 2023-07-17 PROCEDURE — 83735 ASSAY OF MAGNESIUM: CPT

## 2023-07-17 PROCEDURE — 84550 ASSAY OF BLOOD/URIC ACID: CPT

## 2023-07-17 PROCEDURE — 80053 COMPREHEN METABOLIC PANEL: CPT

## 2023-07-17 PROCEDURE — 36415 COLL VENOUS BLD VENIPUNCTURE: CPT

## 2023-07-17 PROCEDURE — 82570 ASSAY OF URINE CREATININE: CPT

## 2023-07-17 PROCEDURE — 82746 ASSAY OF FOLIC ACID SERUM: CPT

## 2023-07-17 PROCEDURE — 82550 ASSAY OF CK (CPK): CPT

## 2023-07-17 PROCEDURE — 87077 CULTURE AEROBIC IDENTIFY: CPT

## 2023-07-17 PROCEDURE — 83036 HEMOGLOBIN GLYCOSYLATED A1C: CPT

## 2023-07-17 PROCEDURE — 82607 VITAMIN B-12: CPT

## 2023-07-17 PROCEDURE — 87186 SC STD MICRODIL/AGAR DIL: CPT

## 2023-07-17 PROCEDURE — 87086 URINE CULTURE/COLONY COUNT: CPT

## 2023-07-18 LAB — RPR MONITORING: NONREACTIVE

## 2023-07-21 LAB — URINE CULTURE: ABNORMAL

## 2023-07-24 DIAGNOSIS — E11.65 INADEQUATELY CONTROLLED DIABETES MELLITUS (HCC): ICD-10-CM

## 2023-07-24 DIAGNOSIS — N30.00 ACUTE CYSTITIS WITHOUT HEMATURIA: ICD-10-CM

## 2023-07-24 RX ORDER — CIPROFLOXACIN 500 MG/1
TABLET ORAL
Qty: 14 TABLET | Refills: 0 | Status: SHIPPED | OUTPATIENT
Start: 2023-07-24 | End: 2023-07-24

## 2023-07-24 RX ORDER — CIPROFLOXACIN 500 MG/1
TABLET ORAL
Qty: 14 TABLET | Refills: 0 | Status: SHIPPED | OUTPATIENT
Start: 2023-07-24 | End: 2023-07-25 | Stop reason: ALTCHOICE

## 2023-07-25 ENCOUNTER — TELEPHONE (OUTPATIENT)
Dept: PRIMARY CARE | Facility: CLINIC | Age: 83
End: 2023-07-25
Payer: MEDICARE

## 2023-07-25 DIAGNOSIS — B96.20 E. COLI UTI: Primary | ICD-10-CM

## 2023-07-25 DIAGNOSIS — N39.0 E. COLI UTI: Primary | ICD-10-CM

## 2023-07-25 RX ORDER — LEVOFLOXACIN 500 MG/1
TABLET, FILM COATED ORAL
Qty: 7 TABLET | Refills: 0 | Status: SHIPPED | OUTPATIENT
Start: 2023-07-25 | End: 2023-07-31 | Stop reason: SDUPTHER

## 2023-07-25 RX ORDER — BLOOD SUGAR DIAGNOSTIC
STRIP MISCELLANEOUS
Qty: 100 STRIP | Refills: 3 | Status: SHIPPED | OUTPATIENT
Start: 2023-07-25

## 2023-07-25 NOTE — TELEPHONE ENCOUNTER
Daughter is asking if there id a different antibiotic pt can take for UTI, says mom doesn't normally eat breakfast so it will be hard for her to drink cipro with a meal and keep 10-12 hrs apart.

## 2023-07-31 ENCOUNTER — TELEPHONE (OUTPATIENT)
Dept: PRIMARY CARE | Facility: CLINIC | Age: 83
End: 2023-07-31
Payer: MEDICARE

## 2023-07-31 DIAGNOSIS — N39.0 E. COLI UTI: ICD-10-CM

## 2023-07-31 DIAGNOSIS — B96.20 E. COLI UTI: ICD-10-CM

## 2023-07-31 RX ORDER — LEVOFLOXACIN 500 MG/1
TABLET, FILM COATED ORAL
Qty: 7 TABLET | Refills: 0 | Status: SHIPPED | OUTPATIENT
Start: 2023-07-31 | End: 2023-08-21 | Stop reason: ALTCHOICE

## 2023-07-31 NOTE — TELEPHONE ENCOUNTER
Pt says she misplaced levofloxacin prescription, has only taken about 2 doses. Could you resend to pharmacy?

## 2023-08-21 ENCOUNTER — OFFICE VISIT (OUTPATIENT)
Dept: PRIMARY CARE | Facility: CLINIC | Age: 83
End: 2023-08-21
Payer: MEDICARE

## 2023-08-21 VITALS
SYSTOLIC BLOOD PRESSURE: 120 MMHG | HEART RATE: 86 BPM | WEIGHT: 134 LBS | DIASTOLIC BLOOD PRESSURE: 70 MMHG | OXYGEN SATURATION: 97 % | BODY MASS INDEX: 27.01 KG/M2 | HEIGHT: 59 IN

## 2023-08-21 DIAGNOSIS — I10 ESSENTIAL HYPERTENSION: ICD-10-CM

## 2023-08-21 DIAGNOSIS — E11.65 TYPE 2 DIABETES MELLITUS WITH HYPERGLYCEMIA, WITHOUT LONG-TERM CURRENT USE OF INSULIN (MULTI): ICD-10-CM

## 2023-08-21 DIAGNOSIS — Z00.00 ROUTINE GENERAL MEDICAL EXAMINATION AT HEALTH CARE FACILITY: Primary | ICD-10-CM

## 2023-08-21 DIAGNOSIS — E53.8 VITAMIN B12 DEFICIENCY: ICD-10-CM

## 2023-08-21 DIAGNOSIS — R41.89 COGNITIVE DECLINE: ICD-10-CM

## 2023-08-21 DIAGNOSIS — E78.00 PURE HYPERCHOLESTEROLEMIA WITH TARGET LOW DENSITY LIPOPROTEIN (LDL) CHOLESTEROL LESS THAN 70 MG/DL: ICD-10-CM

## 2023-08-21 DIAGNOSIS — Z00.00 MEDICARE ANNUAL WELLNESS VISIT, SUBSEQUENT: ICD-10-CM

## 2023-08-21 PROCEDURE — 3078F DIAST BP <80 MM HG: CPT | Performed by: INTERNAL MEDICINE

## 2023-08-21 PROCEDURE — 1170F FXNL STATUS ASSESSED: CPT | Performed by: INTERNAL MEDICINE

## 2023-08-21 PROCEDURE — 1159F MED LIST DOCD IN RCRD: CPT | Performed by: INTERNAL MEDICINE

## 2023-08-21 PROCEDURE — 99213 OFFICE O/P EST LOW 20 MIN: CPT | Performed by: INTERNAL MEDICINE

## 2023-08-21 PROCEDURE — 1123F ACP DISCUSS/DSCN MKR DOCD: CPT | Performed by: INTERNAL MEDICINE

## 2023-08-21 PROCEDURE — 1160F RVW MEDS BY RX/DR IN RCRD: CPT | Performed by: INTERNAL MEDICINE

## 2023-08-21 PROCEDURE — 3074F SYST BP LT 130 MM HG: CPT | Performed by: INTERNAL MEDICINE

## 2023-08-21 PROCEDURE — 1036F TOBACCO NON-USER: CPT | Performed by: INTERNAL MEDICINE

## 2023-08-21 PROCEDURE — G0439 PPPS, SUBSEQ VISIT: HCPCS | Performed by: INTERNAL MEDICINE

## 2023-08-21 ASSESSMENT — ACTIVITIES OF DAILY LIVING (ADL)
GROCERY_SHOPPING: INDEPENDENT
TAKING_MEDICATION: INDEPENDENT
DRESSING: INDEPENDENT
MANAGING_FINANCES: NEEDS ASSISTANCE
BATHING: INDEPENDENT
DOING_HOUSEWORK: NEEDS ASSISTANCE

## 2023-08-21 ASSESSMENT — ENCOUNTER SYMPTOMS
OCCASIONAL FEELINGS OF UNSTEADINESS: 0
DEPRESSION: 0
LOSS OF SENSATION IN FEET: 0

## 2023-08-21 ASSESSMENT — PATIENT HEALTH QUESTIONNAIRE - PHQ9
SUM OF ALL RESPONSES TO PHQ9 QUESTIONS 1 AND 2: 0
2. FEELING DOWN, DEPRESSED OR HOPELESS: NOT AT ALL
1. LITTLE INTEREST OR PLEASURE IN DOING THINGS: NOT AT ALL

## 2023-08-21 NOTE — PATIENT INSTRUCTIONS
Thank you very much for coming.  I am very happy to see you.    We did your Medicare Wellness evaluation today, and you seem to be doing very well!    Please continue to stay physically active.  Please continue staying sociable.  You can still do some dancing at home, just like your daughter, Suyapa said!  This is a good idea.    Likewise, it is a good idea continue to go to [employer].  It is very important to stay sociable please.  This will be good for you and your , Adrianna!    Please continue drinking lots of fluids.  Add at least 1 more cup of fluids to your regimen!    We discussed your LIVING WILL wishes.  We will keep your DAUGHTER, Suyapa, as your DURABLE POWER OF  for healthcare matters.  This means that if we have to make decisions, and you are unable to speak for yourself, Suyapa will be speaking on your behalf.      Your CODE STATUS is FULL CODE, meaning that if anything happens, we will be able to dial 911 and call EMS to revive you with CPR.  If needed, we can transfer you to the ER, and if needed, we can treat it with extraordinary means of treatment, and admit you to the ICU, and help you breathe with the machine.  Of course, when these things happen, we will discuss options further with your daughter, Suyapa.    Again, thank you very much for coming.  You are doing well.  Please continue to follow with your heart doctor, with your diabetes doctor, and please do not miss your appointments with the foot doctor and the eye doctor!  Please call sooner as needed.    Please do your VACCINATIONS soon, before the beginning of the flu season:  PNEUMONIA vaccination, PREVNAR 20 please anytime soon.  After 2 weeks, please also schedule yourself for the SHINGLES vaccination which is the SHINGRIX series of 2 injections, at least 1 month apart.    Please call your local pharmacist and have these arranged.  They are already covered by your insurance, and your local pharmacist is expecting you!  Please do  not have more than 1 vaccination per visit, and keep your vaccinations at least 2 weeks apart.    Again, thank you very much for coming.  Please do not hesitate to call with questions or concerns.  Please continue to take care of yourself and each other, and please continue to pray for our recovery from this pandemic.  See you in NOVEMBER.            0  Return in November.  20 minutes please.  Coordinate with endocrinology, consider fasting lipid panel, consider repeat urinalysis, consider repeat B12 determination.  Reassess mood, energy, function, cognition, and rule out caregiver stress of daughter.  Coordinate with cardiology, podiatry, ophthalmology.  Consider 2D echocardiogram if appropriate and if not yet done.            0

## 2023-08-21 NOTE — PROGRESS NOTES
Subjective   Reason for Visit: Marci Han is an 83 y.o. female here for a Medicare Wellness visit.     Past Medical, Surgical, and Family History reviewed and updated in chart.    Reviewed all medications by prescribing practitioner or clinical pharmacist (such as prescriptions, OTCs, herbal therapies and supplements) and documented in the medical record.    HPI  The patient is here for reevaluation of cognition, mood, energy, function, rule out self-neglect, rule out caregiver stress of daughter.  She has been compliant with medications, tolerating regimens, and remains very much motivated to stay physically active.  In fact, she is frustrated that her friends no longer want to go out to dance, it seems.  Even her male  does not feel like going out.  She states that she becomes a little restless, and that her  only wants to go to [employer], but does not want to take her out.    Still, she is able to do most of her activities of daily living, including instrumental activities like driving short distances only, only in the daytime.  Daughter, Precious, very attentive to her needs, and helping her with her instrumental activities.  Has yet to see OPHTHALMOLOGY and PODIATRY, but these are already scheduled.    Again, compliant with medications, tolerating regimens.  Appetite preserved, no abdominal distress.  Denies any urinary symptoms whatsoever, even with history of positive urine.  No overlying skin changes.  Careful about exposure.  No coughing, no sputum production.  No ashish substernal chest pain, no orthopnea, no paroxysmal nocturnal dyspnea.  ENDOCRINE with no polyuria, polydipsia, polyphagia.  No blurred vision.  No skin, hair, nail changes.  No dramatic weight loss or weight gain.  CONSTITUTIONALLY, no fever, no chills.  No night sweats.  No lingering anorexia or nausea.  No apparent lymphadenopathy.  No apparent weight loss.    The patient does admit that she probably could increase fluid  "intake.  Otherwise, again, capable of taking care of self with mostly prodding only, and some assistance with instrumental activities from the DAUGHTER, who is also the DURABLE POWER OF  for healthcare matters.  Reviewed CODE STATUS, below.    Patient Care Team:  Erich Nguyễn MD as PCP - General (Internal Medicine)  Frances Azar MD as PCP - [employer] PCP         Review of Systems  Review of systems as in history of present illness, and otherwise, reviewed separately as well, and was unremarkable/negative/noncontributory.          Objective   Vitals:  /70   Pulse 86   Ht 1.499 m (4' 11\")   Wt 60.8 kg (134 lb)   SpO2 97%   BMI 27.06 kg/m²       Physical Exam  In very good spirits.  Not in distress or diaphoresis.  Alert, oriented x3, even as daughter is a little worried about her short-term memory.  Seemingly appropriate.  Moderately built.  Not unkempt.  Continues to want to care for self and improve quality of life.  Does not wish harm to self or others.    HEAD pink palpebral conjunctivae, anicteric sclerae.  NECK supple, no apparent jugular venous distention.  CARDIOVASCULAR not in distress or diaphoresis.  No bipedal edema.  LUNGS not in distress or diaphoresis.  Not using accessory muscles.  ABDOMEN soft, nontender.  BACK no costovertebral angle tenderness.  EXTREMITIES no clubbing, no cyanosis.  NEURO no facial asymmetry.  No apparent cranial nerve deficits.  Romberg negative.  Ambulating without need of assistance.  No apparent focal weakness.  No tremors.  PSYCH receptive, appropriate, and eager to maintain and improve quality of life.      LABORATORY examinations reviewed with patient and daughter/POA.        Assessment/Plan   Problem List Items Addressed This Visit       Diabetes mellitus (CMS/[facility])    Essential hypertension    Pure hypercholesterolemia with target low density lipoprotein (LDL) cholesterol less than 70 mg/dL    Vitamin B12 deficiency "     Other Visit Diagnoses       Routine general medical examination at health care facility    -  Primary    Medicare annual wellness visit, subsequent        Cognitive decline                   Thank you very much for coming.  I am very happy to see you.    We did your Medicare Wellness evaluation today, and you seem to be doing very well!    Please continue to stay physically active.  Please continue staying sociable.  You can still do some dancing at home, just like your daughter, Suyapa said!  This is a good idea.    Likewise, it is a good idea continue to go to [employer].  It is very important to stay sociable please.  This will be good for you and your , Adrianna!    Please continue drinking lots of fluids.  Add at least 1 more cup of fluids to your regimen!    We discussed your LIVING WILL wishes.  We will keep your DAUGHTER, Suyapa, as your DURABLE POWER OF  for healthcare matters.  This means that if we have to make decisions, and you are unable to speak for yourself, Suyapa will be speaking on your behalf.      Your CODE STATUS is FULL CODE, meaning that if anything happens, we will be able to dial 911 and call EMS to revive you with CPR.  If needed, we can transfer you to the ER, and if needed, we can treat it with extraordinary means of treatment, and admit you to the ICU, and help you breathe with the machine.  Of course, when these things happen, we will discuss options further with your daughter, Suyapa.    Again, thank you very much for coming.  You are doing well.  Please continue to follow with your heart doctor, with your diabetes doctor, and please do not miss your appointments with the foot doctor and the eye doctor!  Please call sooner as needed.    Please do your VACCINATIONS soon, before the beginning of the flu season:  PNEUMONIA vaccination, PREVNAR 20 please anytime soon.  After 2 weeks, please also schedule yourself for the SHINGLES vaccination which is the SHINGRIX series of 2  injections, at least 1 month apart.    Please call your local pharmacist and have these arranged.  They are already covered by your insurance, and your local pharmacist is expecting you!  Please do not have more than 1 vaccination per visit, and keep your vaccinations at least 2 weeks apart.    Again, thank you very much for coming.  Please do not hesitate to call with questions or concerns.  Please continue to take care of yourself and each other, and please continue to pray for our recovery from this pandemic.  See you in NOVEMBER.            0  Return in November.  20 minutes please.  Coordinate with endocrinology, consider fasting lipid panel, consider repeat urinalysis, consider repeat B12 determination.  Reassess mood, energy, function, cognition, and rule out caregiver stress of daughter.  Coordinate with cardiology, podiatry, ophthalmology.  Consider 2D echocardiogram if appropriate and if not yet done.            0

## 2023-08-26 DIAGNOSIS — E11.65 INADEQUATELY CONTROLLED DIABETES MELLITUS (HCC): ICD-10-CM

## 2023-08-28 RX ORDER — BLOOD SUGAR DIAGNOSTIC
STRIP MISCELLANEOUS
Qty: 100 STRIP | Refills: 3 | Status: SHIPPED | OUTPATIENT
Start: 2023-08-28

## 2023-11-03 DIAGNOSIS — E11.65 INADEQUATELY CONTROLLED DIABETES MELLITUS (HCC): ICD-10-CM

## 2023-11-03 LAB
ANION GAP SERPL CALCULATED.3IONS-SCNC: 8 MEQ/L (ref 9–15)
BUN SERPL-MCNC: 12 MG/DL (ref 8–23)
CALCIUM SERPL-MCNC: 9.8 MG/DL (ref 8.5–9.9)
CHLORIDE SERPL-SCNC: 104 MEQ/L (ref 95–107)
CHOLEST SERPL-MCNC: 170 MG/DL (ref 0–199)
CO2 SERPL-SCNC: 29 MEQ/L (ref 20–31)
CREAT SERPL-MCNC: 0.58 MG/DL (ref 0.5–0.9)
GLUCOSE SERPL-MCNC: 151 MG/DL (ref 70–99)
HBA1C MFR BLD: 8.8 % (ref 4.8–5.9)
HDLC SERPL-MCNC: 45 MG/DL (ref 40–59)
LDLC SERPL CALC-MCNC: 91 MG/DL (ref 0–129)
POTASSIUM SERPL-SCNC: 4.3 MEQ/L (ref 3.4–4.9)
SODIUM SERPL-SCNC: 141 MEQ/L (ref 135–144)
TRIGL SERPL-MCNC: 170 MG/DL (ref 0–150)

## 2023-11-14 ENCOUNTER — OFFICE VISIT (OUTPATIENT)
Dept: ENDOCRINOLOGY | Age: 83
End: 2023-11-14
Payer: MEDICARE

## 2023-11-14 VITALS
SYSTOLIC BLOOD PRESSURE: 138 MMHG | OXYGEN SATURATION: 92 % | HEIGHT: 59 IN | DIASTOLIC BLOOD PRESSURE: 79 MMHG | BODY MASS INDEX: 24.8 KG/M2 | WEIGHT: 123 LBS | HEART RATE: 95 BPM

## 2023-11-14 DIAGNOSIS — E11.65 INADEQUATELY CONTROLLED DIABETES MELLITUS (HCC): Primary | ICD-10-CM

## 2023-11-14 LAB
CHP ED QC CHECK: ABNORMAL
GLUCOSE BLD-MCNC: 384 MG/DL

## 2023-11-14 PROCEDURE — 99213 OFFICE O/P EST LOW 20 MIN: CPT | Performed by: INTERNAL MEDICINE

## 2023-11-14 PROCEDURE — 1123F ACP DISCUSS/DSCN MKR DOCD: CPT | Performed by: INTERNAL MEDICINE

## 2023-11-14 PROCEDURE — 82962 GLUCOSE BLOOD TEST: CPT | Performed by: INTERNAL MEDICINE

## 2023-11-14 PROCEDURE — 3052F HG A1C>EQUAL 8.0%<EQUAL 9.0%: CPT | Performed by: INTERNAL MEDICINE

## 2023-11-14 ASSESSMENT — ENCOUNTER SYMPTOMS: COLOR CHANGE: 1

## 2023-11-14 NOTE — PROGRESS NOTES
education level: Not on file   Occupational History    Not on file   Tobacco Use    Smoking status: Never     Passive exposure: Never    Smokeless tobacco: Never   Vaping Use    Vaping Use: Never used   Substance and Sexual Activity    Alcohol use: Not Currently     Comment: occasional    Drug use: Never    Sexual activity: Not Currently     Partners: Male   Other Topics Concern    Not on file   Social History Narrative    Not on file     Social Determinants of Health     Financial Resource Strain: Not on file   Food Insecurity: Not on file   Transportation Needs: Not on file   Physical Activity: Not on file   Stress: Not on file   Social Connections: Not on file   Intimate Partner Violence: Not on file   Housing Stability: Not on file     Family History   Problem Relation Age of Onset    Diabetes Mother     Hypertension Mother     Hypertension Father     Diabetes Father     Hypertension Sister     Diabetes Sister     Hypertension Brother     Diabetes Brother      Allergies   Allergen Reactions    Asa [Aspirin] Other (See Comments)     Stomach issues    Iodinated Contrast Media Other (See Comments)    Lovastatin Other (See Comments)    Meloxicam Other (See Comments)    Naproxen Other (See Comments)    Pravastatin Other (See Comments)    Shellfish-Derived Products Hives       Current Outpatient Medications:     ONETOUCH ULTRA strip, TEST 3 X DAILY E11.65, Disp: 100 strip, Rfl: 3    aspirin 81 MG chewable tablet, Take by mouth, Disp: , Rfl:     betamethasone valerate (VALISONE) 0.1 % cream, Apply topically 2 times daily, Disp: , Rfl:     cyanocobalamin 100 MCG tablet, Take 1 tablet by mouth daily, Disp: , Rfl:     enalapril (VASOTEC) 5 MG tablet, Take by mouth, Disp: , Rfl:     glimepiride (AMARYL) 2 MG tablet, Take 1 tablet by mouth daily, Disp: , Rfl:     lisinopril (PRINIVIL;ZESTRIL) 5 MG tablet, Take 1 tablet by mouth 2 times daily, Disp: , Rfl:     pioglitazone (ACTOS) 30 MG tablet, , Disp: , Rfl:     pravastatin

## 2023-11-20 ENCOUNTER — OFFICE VISIT (OUTPATIENT)
Dept: PRIMARY CARE | Facility: CLINIC | Age: 83
End: 2023-11-20
Payer: MEDICARE

## 2023-11-20 VITALS
SYSTOLIC BLOOD PRESSURE: 136 MMHG | OXYGEN SATURATION: 96 % | BODY MASS INDEX: 27.21 KG/M2 | HEIGHT: 59 IN | RESPIRATION RATE: 16 BRPM | HEART RATE: 88 BPM | WEIGHT: 135 LBS | DIASTOLIC BLOOD PRESSURE: 80 MMHG

## 2023-11-20 DIAGNOSIS — E53.8 VITAMIN B12 DEFICIENCY: ICD-10-CM

## 2023-11-20 DIAGNOSIS — E78.00 PURE HYPERCHOLESTEROLEMIA WITH TARGET LOW DENSITY LIPOPROTEIN (LDL) CHOLESTEROL LESS THAN 70 MG/DL: ICD-10-CM

## 2023-11-20 DIAGNOSIS — R41.89 COGNITIVE DECLINE: Primary | ICD-10-CM

## 2023-11-20 DIAGNOSIS — I10 ESSENTIAL HYPERTENSION: ICD-10-CM

## 2023-11-20 DIAGNOSIS — R41.3 MEMORY LOSS: ICD-10-CM

## 2023-11-20 DIAGNOSIS — E11.65 TYPE 2 DIABETES MELLITUS WITH HYPERGLYCEMIA, WITHOUT LONG-TERM CURRENT USE OF INSULIN (MULTI): ICD-10-CM

## 2023-11-20 DIAGNOSIS — R35.0 URINARY FREQUENCY: ICD-10-CM

## 2023-11-20 DIAGNOSIS — R35.1 NOCTURIA: ICD-10-CM

## 2023-11-20 LAB
APPEARANCE UR: ABNORMAL
BACTERIA #/AREA URNS AUTO: ABNORMAL /HPF
BILIRUB UR STRIP.AUTO-MCNC: NEGATIVE MG/DL
COLOR UR: YELLOW
GLUCOSE UR STRIP.AUTO-MCNC: ABNORMAL MG/DL
HOLD SPECIMEN: NORMAL
KETONES UR STRIP.AUTO-MCNC: NEGATIVE MG/DL
LEUKOCYTE ESTERASE UR QL STRIP.AUTO: NEGATIVE
MUCOUS THREADS #/AREA URNS AUTO: ABNORMAL /LPF
NITRITE UR QL STRIP.AUTO: NEGATIVE
PH UR STRIP.AUTO: 5 [PH]
PROT UR STRIP.AUTO-MCNC: NEGATIVE MG/DL
RBC # UR STRIP.AUTO: ABNORMAL /UL
RBC #/AREA URNS AUTO: ABNORMAL /HPF
SP GR UR STRIP.AUTO: 1.02
SQUAMOUS #/AREA URNS AUTO: ABNORMAL /HPF
UROBILINOGEN UR STRIP.AUTO-MCNC: 2 MG/DL
WBC #/AREA URNS AUTO: ABNORMAL /HPF

## 2023-11-20 PROCEDURE — 99213 OFFICE O/P EST LOW 20 MIN: CPT | Performed by: INTERNAL MEDICINE

## 2023-11-20 PROCEDURE — 81001 URINALYSIS AUTO W/SCOPE: CPT

## 2023-11-20 PROCEDURE — 1160F RVW MEDS BY RX/DR IN RCRD: CPT | Performed by: INTERNAL MEDICINE

## 2023-11-20 PROCEDURE — 90662 IIV NO PRSV INCREASED AG IM: CPT | Performed by: INTERNAL MEDICINE

## 2023-11-20 PROCEDURE — 1159F MED LIST DOCD IN RCRD: CPT | Performed by: INTERNAL MEDICINE

## 2023-11-20 PROCEDURE — 3079F DIAST BP 80-89 MM HG: CPT | Performed by: INTERNAL MEDICINE

## 2023-11-20 PROCEDURE — 3075F SYST BP GE 130 - 139MM HG: CPT | Performed by: INTERNAL MEDICINE

## 2023-11-20 PROCEDURE — 96372 THER/PROPH/DIAG INJ SC/IM: CPT | Performed by: INTERNAL MEDICINE

## 2023-11-20 PROCEDURE — G0008 ADMIN INFLUENZA VIRUS VAC: HCPCS | Performed by: INTERNAL MEDICINE

## 2023-11-20 PROCEDURE — 1036F TOBACCO NON-USER: CPT | Performed by: INTERNAL MEDICINE

## 2023-11-20 RX ORDER — DONEPEZIL HYDROCHLORIDE 5 MG/1
5 TABLET, FILM COATED ORAL NIGHTLY
Qty: 30 TABLET | Refills: 2 | Status: SHIPPED | OUTPATIENT
Start: 2023-11-20 | End: 2024-01-22

## 2023-11-20 RX ORDER — CYANOCOBALAMIN 1000 UG/ML
1000 INJECTION, SOLUTION INTRAMUSCULAR; SUBCUTANEOUS ONCE
Status: COMPLETED | OUTPATIENT
Start: 2023-11-20 | End: 2023-11-20

## 2023-11-20 RX ADMIN — CYANOCOBALAMIN 1000 MCG: 1000 INJECTION, SOLUTION INTRAMUSCULAR; SUBCUTANEOUS at 11:11

## 2023-11-20 NOTE — PATIENT INSTRUCTIONS
Thank you very much for coming.  I am very happy to see you.    Today, let us check your URINE again, just to make sure there is no bad reason for you to have to urinate often, especially in the middle of the night.  You might benefit from medication if your urine is clean without any infection.  Let me call with results and possible changes.    It is also a good idea to get you started on medication to strengthen your memory!  DONEPEZIL/ARICEPT 5 mg at bedtime please.  If you would rather take it with SUPPER, that is okay to.    You will benefit from INFLUENZA vaccination today.  After 2 weeks, please get yourself vaccinated for PNEUMONIA with of the Prevnar 20.  Your local friendly pharmacy will help!  After another 2 weeks, please get yourself vaccinated for COVID.    Please keep your vaccinations at least 2 weeks apart.    Again, thank you very much for coming.  Initially, I thought it would be best to see you in 3 months with fasting lab work, but it will actually help more if you come back in 2 months.  No need for repeat lab work at this time.  Just let me see how you are doing with your medications, and we will strive to keep you strong and independent!  Until then, please continue to take care of yourself and each other, and please continue to pray for our recovery from this pandemic.    Hernesto glover de pavo!  Prabhakar Huy y Luis Ano [place]!  See you in 2 months.            0  Return in 2 months.  20 minutes please.  Reassess mood, energy, function, rule out self-neglect, caregiver stress.  Reassess compliance, tolerance.  Coordinate with endocrinology, cardiology, podiatry, ophthalmology.  Consider recheck B12 if not taking medications.              0

## 2023-11-20 NOTE — PROGRESS NOTES
Subjective   Patient ID: Marci Han is a 83 y.o. female who presents for Follow-up.    HPI   Brought in by daughter, who is worried about her mom's compliance and her memory.  The patient still lives with her , and they try to take care of each other.  Her  is physically limited, and she is perhaps mentally limited.  She takes her medications only once a day, taking them all with DINNER.  Compliance was a question at 1 point.    Seen by ENDOCRINOLOGY, noted to have elevated hemoglobin A1c at 8.8, noted to not be taking her SULFONYLUREA.  Arrangements have since been made, so that she takes her medications all together with supper.  Tolerating medications.  Appetite preserved, with no nausea, vomiting, abdominal distress.  No diarrhea, no constipation.  No apparent blood in stool.  No apparent weight loss.  No dysuria, flank, suprapubic pain.  No discharge, no pruritus.  No rash.  No malodor.  No hesitancy, no feeling of incomplete voiding.  Some NOCTURIA noted, up to 4 times each night.  No skin changes, rashes, pruritus, jaundice.  No easy bruisability.  ENDOCRINE with no polyuria, polydipsia, polyphagia.  No blurred vision.  No skin, hair, nail changes.  No dramatic weight loss or weight gain.      Otherwise, physically active.  Denies any chest pain, palpitations, orthopnea, paroxysmal nocturnal dyspnea.  No particular cough, sputum production.  No headache, blurred vision, diplopia.  No dysphagia.  The patient herself is not worried about her memory, and feels that she is doing relatively well, she just needs to ask help from time to time.  CONSTITUTIONALLY, no fever, no chills.  No night sweats.  No lingering anorexia or nausea.  No apparent lymphadenopathy.  No apparent weight loss.        Review of Systems  Review of systems as in history of present illness, and otherwise, reviewed separately as well, and was unremarkable/negative/noncontributory.        Objective   /80 (BP  "Location: Left arm, Patient Position: Sitting, BP Cuff Size: Adult)   Pulse 88   Resp 16   Ht 1.499 m (4' 11\")   Wt 61.2 kg (135 lb)   SpO2 96%   BMI 27.27 kg/m²     Physical Exam  In otherwise good spirits.  Not in distress or diaphoresis.  Receptive, oriented to person and place.  Looks to her daughter for clarification from time to time, speaks mainly Turkmen, but can understand English, and does speak a little English as well.  Moderately built.  Not unkempt.  Appropriate.  Eager to stay healthy and independent, and does not wish harm to self or others.    HEAD pink palpebral conjunctivae, anicteric sclerae.  NECK supple, no apparent jugular venous distention.  CARDIOVASCULAR not in distress or diaphoresis.  No bipedal edema.  LUNGS not in distress or diaphoresis.  Not using accessory muscles.  ABDOMEN soft, nontender.  BACK no costovertebral angle tenderness.  EXTREMITIES no clubbing, no cyanosis.  NEURO no facial asymmetry.  No apparent cranial nerve deficits.  Romberg negative.  Ambulating without need of assistance.  No apparent focal weakness.  No tremors.  PSYCH receptive, appropriate, and eager to maintain and improve quality of life.      LABORATORY results reviewed with patient and daughter.        Assessment/Plan   Diagnoses and all orders for this visit:  Cognitive decline  -     donepezil (Aricept) 5 mg tablet; Take 1 tablet (5 mg) by mouth once daily at bedtime.  -     Flu vaccine, quadrivalent, high-dose, preservative free, age 65y+ (FLUZONE)  -     cyanocobalamin (Vitamin B-12) injection 1,000 mcg  -     Follow Up In Primary Care - Established; Future  Memory loss  -     donepezil (Aricept) 5 mg tablet; Take 1 tablet (5 mg) by mouth once daily at bedtime.  -     Flu vaccine, quadrivalent, high-dose, preservative free, age 65y+ (FLUZONE)  -     cyanocobalamin (Vitamin B-12) injection 1,000 mcg  -     Follow Up In Primary Care - Established; Future  Nocturia  -     Urinalysis with Reflex " Microscopic and Culture  -     Flu vaccine, quadrivalent, high-dose, preservative free, age 65y+ (FLUZONE)  -     Follow Up In Primary Care [facility]; Future  Urinary frequency  -     Urinalysis with Reflex Microscopic and Culture  -     Flu vaccine, quadrivalent, high-dose, preservative free, age 65y+ (FLUZONE)  -     Follow Up In Primary Care [facility]; Future  Type 2 diabetes mellitus with hyperglycemia, without long-term current use of insulin (CMS/HCC)  -     Urinalysis with Reflex Microscopic and Culture  -     Flu vaccine, quadrivalent, high-dose, preservative free, age 65y+ (FLUZONE)  -     Follow Up In Primary Care [facility]; Future  Essential hypertension  -     Urinalysis with Reflex Microscopic and Culture  -     Flu vaccine, quadrivalent, high-dose, preservative free, age 65y+ (FLUZONE)  -     Follow Up In Primary Care [facility]; Future  Pure hypercholesterolemia with target low density lipoprotein (LDL) cholesterol less than 70 mg/dL  -     Flu vaccine, quadrivalent, high-dose, preservative free, age 65y+ (FLUZONE)  -     Follow Up In Primary Care [facility]; Future  Vitamin B12 deficiency  -     cyanocobalamin (Vitamin B-12) injection 1,000 mcg  -     Follow Up In Primary Care [facility]; Future  Other orders  -     Follow Up In Primary Care [facility]       Thank you very much for coming.  I am very happy to see you.    Today, let us check your URINE again, just to make sure there is no bad reason for you to have to urinate often, especially in the middle of the night.  You might benefit from medication if your urine is clean without any infection.  Let me call with results and possible changes.    It is also a good idea to get you started on medication to strengthen your memory!  DONEPEZIL/ARICEPT 5 mg at bedtime please.  If you would rather take it with SUPPER, that is okay to.    You will benefit from INFLUENZA vaccination today.  After 2 weeks, please get yourself  vaccinated for PNEUMONIA with of the Prevnar 20.  Your local friendly pharmacy will help!  After another 2 weeks, please get yourself vaccinated for COVID.    Please keep your vaccinations at least 2 weeks apart.    Again, thank you very much for coming.  Initially, I thought it would be best to see you in 3 months with fasting lab work, but it will actually help more if you come back in 2 months.  No need for repeat lab work at this time.  Just let me see how you are doing with your medications, and we will strive to keep you strong and independent!  Until then, please continue to take care of yourself and each other, and please continue to pray for our recovery from this pandemic.    Buen adalberto de pavo!  Prabhakar Huy y Luis Ano [place]!  See you in 2 months.            0  Return in 2 months.  20 minutes please.  Reassess mood, energy, function, rule out self-neglect, caregiver stress.  Reassess compliance, tolerance.  Coordinate with endocrinology, cardiology, podiatry, ophthalmology.  Consider recheck B12 if not taking medications.              0

## 2024-01-22 DIAGNOSIS — R41.3 MEMORY LOSS: ICD-10-CM

## 2024-01-22 DIAGNOSIS — R41.89 COGNITIVE DECLINE: ICD-10-CM

## 2024-01-22 RX ORDER — DONEPEZIL HYDROCHLORIDE 5 MG/1
5 TABLET, FILM COATED ORAL NIGHTLY
Qty: 30 TABLET | Refills: 2 | Status: SHIPPED | OUTPATIENT
Start: 2024-01-22 | End: 2024-01-29 | Stop reason: SDUPTHER

## 2024-01-29 ENCOUNTER — OFFICE VISIT (OUTPATIENT)
Dept: PRIMARY CARE | Facility: CLINIC | Age: 84
End: 2024-01-29
Payer: MEDICARE

## 2024-01-29 VITALS
OXYGEN SATURATION: 95 % | HEIGHT: 59 IN | WEIGHT: 125 LBS | SYSTOLIC BLOOD PRESSURE: 147 MMHG | HEART RATE: 80 BPM | BODY MASS INDEX: 25.2 KG/M2 | DIASTOLIC BLOOD PRESSURE: 75 MMHG

## 2024-01-29 DIAGNOSIS — R41.89 COGNITIVE DECLINE: Primary | ICD-10-CM

## 2024-01-29 DIAGNOSIS — E11.65 TYPE 2 DIABETES MELLITUS WITH HYPERGLYCEMIA, WITHOUT LONG-TERM CURRENT USE OF INSULIN (MULTI): ICD-10-CM

## 2024-01-29 DIAGNOSIS — R41.3 MEMORY LOSS: ICD-10-CM

## 2024-01-29 DIAGNOSIS — E53.8 VITAMIN B12 DEFICIENCY: ICD-10-CM

## 2024-01-29 DIAGNOSIS — G30.1 MILD LATE ONSET ALZHEIMER'S DEMENTIA WITHOUT BEHAVIORAL DISTURBANCE, PSYCHOTIC DISTURBANCE, MOOD DISTURBANCE, OR ANXIETY (MULTI): ICD-10-CM

## 2024-01-29 DIAGNOSIS — F02.A0 MILD LATE ONSET ALZHEIMER'S DEMENTIA WITHOUT BEHAVIORAL DISTURBANCE, PSYCHOTIC DISTURBANCE, MOOD DISTURBANCE, OR ANXIETY (MULTI): ICD-10-CM

## 2024-01-29 DIAGNOSIS — E11.51 TYPE 2 DIABETES MELLITUS WITH DIABETIC PERIPHERAL ANGIOPATHY WITHOUT GANGRENE, WITHOUT LONG-TERM CURRENT USE OF INSULIN (MULTI): ICD-10-CM

## 2024-01-29 DIAGNOSIS — E55.9 VITAMIN D DEFICIENCY: ICD-10-CM

## 2024-01-29 DIAGNOSIS — I10 ESSENTIAL HYPERTENSION: ICD-10-CM

## 2024-01-29 DIAGNOSIS — E78.00 PURE HYPERCHOLESTEROLEMIA WITH TARGET LOW DENSITY LIPOPROTEIN (LDL) CHOLESTEROL LESS THAN 70 MG/DL: ICD-10-CM

## 2024-01-29 PROCEDURE — 1159F MED LIST DOCD IN RCRD: CPT | Performed by: INTERNAL MEDICINE

## 2024-01-29 PROCEDURE — 3077F SYST BP >= 140 MM HG: CPT | Performed by: INTERNAL MEDICINE

## 2024-01-29 PROCEDURE — 1160F RVW MEDS BY RX/DR IN RCRD: CPT | Performed by: INTERNAL MEDICINE

## 2024-01-29 PROCEDURE — 1036F TOBACCO NON-USER: CPT | Performed by: INTERNAL MEDICINE

## 2024-01-29 PROCEDURE — 3078F DIAST BP <80 MM HG: CPT | Performed by: INTERNAL MEDICINE

## 2024-01-29 PROCEDURE — 99213 OFFICE O/P EST LOW 20 MIN: CPT | Performed by: INTERNAL MEDICINE

## 2024-01-29 RX ORDER — GLIMEPIRIDE 2 MG/1
TABLET ORAL
Qty: 30 TABLET | Refills: 1 | Status: SHIPPED | OUTPATIENT
Start: 2024-01-29 | End: 2024-04-09

## 2024-01-29 RX ORDER — MULTIVIT-MIN/FA/LYCOPEN/LUTEIN .4-300-25
1 TABLET ORAL DAILY
COMMUNITY

## 2024-01-29 RX ORDER — DONEPEZIL HYDROCHLORIDE 5 MG/1
5 TABLET, FILM COATED ORAL NIGHTLY
Qty: 90 TABLET | Refills: 0 | Status: SHIPPED | OUTPATIENT
Start: 2024-01-29 | End: 2024-02-12

## 2024-01-29 RX ORDER — CHOLECALCIFEROL (VITAMIN D3) 25 MCG
TABLET ORAL
COMMUNITY

## 2024-01-29 RX ORDER — LISINOPRIL 10 MG/1
TABLET ORAL
Qty: 90 TABLET | Refills: 0 | Status: SHIPPED | OUTPATIENT
Start: 2024-01-29 | End: 2024-05-07 | Stop reason: SDUPTHER

## 2024-01-29 RX ORDER — PRAVASTATIN SODIUM 20 MG/1
TABLET ORAL
Qty: 90 TABLET | Refills: 0 | Status: SHIPPED | OUTPATIENT
Start: 2024-01-29 | End: 2024-05-02 | Stop reason: WASHOUT

## 2024-01-29 NOTE — PATIENT INSTRUCTIONS
Thank you much for coming.  I am very happy to see you.    Let us schedule you for your Medicare Wellness evaluation sometime soon.    Until then, I am very happy to hear that you are doing well.  I will refill your medications, to make sure that you do not run out of your regimens.    Please continue following with your specialists, including your endocrinologist, cardiologist, podiatrist, ophthalmologist, and please continue taking care of yourself and each other!    I am glad to hear that you are taking your VITAMIN D, vitamin B12, as well as your multivitamin.  All of these contribute to a stronger you with a sharp mind!    Also, please continue taking your DONEPEZIL at bedtime.    Let us change your LISINOPRIL to 10 mg.  You will only need to take 1 tablet by mouth every day.  Please throw out the lisinopril 5 mg tablets, so that there is no confusion.  Again, you only need 1 tablet please, because it will now be 10 mg.    Again, thank you very much for coming.  Please do not hesitate to call with questions or concerns.  Please continue to take care of yourself and your family, and each other, and please continue to pray for our recovery from this pandemic.  I hope you had a good [place], and I do wish you a happy new year!            0  Return in 6 weeks.  40 minutes please.  Medicare Wellness evaluation.  Reassess compliance, tolerance, mood, energy, function, cognition, rule out self-neglect, caregiver stress of daughter.  Coordinate with cardiology, endocrinology, podiatry, ophthalmology.  Review preventive strategies, cardiovascular risk.            0

## 2024-01-29 NOTE — PROGRESS NOTES
"Subjective   Patient ID: Marci Han is a 83 y.o. female who presents for 2 month FU (Pt in today for routine 2 month FU).    HPI   Compliant with medications, tolerating regimens.  Remaining physically active.  Continues to want to stay healthy, independent, and productive, and does not wish harm to self or others.  Still managing living with a  who is seems to be taking care of her.  Also, daughter today in attendance, continues to follow her mother closely, makes sure that she has her medications available.    The patient has no particular complaints.  No headache, blurred vision, diplopia.  No dysphagia.  No recent falls.  She herself is not worried about her memory.  No ashish substernal chest pain, no orthopnea, no paroxysmal nocturnal dyspnea.  Appetite preserved, no abdominal distress.  No dysuria, flank, suprapubic pain.  No particular cough, no particular sputum production.  No skin changes, rashes, pruritus, jaundice.  ENDOCRINE with no polyuria, polydipsia, polyphagia.  No blurred vision.  No skin, hair, nail changes.  No dramatic weight loss or weight gain.  CONSTITUTIONALLY, no fever, no chills.  No night sweats.  No lingering anorexia or nausea.  No apparent lymphadenopathy.  No apparent weight loss.        Review of Systems  Review of systems as in history of present illness, and otherwise, reviewed separately as well, and was unremarkable/negative/noncontributory.        Objective   /75 (BP Location: Left arm, Patient Position: Sitting)   Pulse 80   Ht 1.499 m (4' 11\")   Wt 56.7 kg (125 lb)   SpO2 95%   BMI 25.25 kg/m²     Physical Exam  In very good spirits.  Not in distress or diaphoresis.  Alert, oriented to person and place.  Does not resent gentle correction or reminder.  Continues to want to maintain and improve quality of life, and does not wish harm to self or others.  Moderately built.    HEAD pink palpebral conjunctivae, anicteric sclerae.  NECK supple, no apparent " jugular venous distention.  CARDIOVASCULAR not in distress or diaphoresis.  No bipedal edema.  LUNGS not in distress or diaphoresis.  Not using accessory muscles.  ABDOMEN soft, nontender.  BACK no costovertebral angle tenderness.  EXTREMITIES no clubbing, no cyanosis.  NEURO no facial asymmetry.  No apparent cranial nerve deficits.  Romberg negative.  Ambulating without need of assistance.  No apparent focal weakness.  No tremors.  PSYCH receptive, appropriate, and eager to maintain and improve quality of life.      LABORATORY results reviewed.      Assessment/Plan   Diagnoses and all orders for this visit:  Cognitive decline  -     Follow Up In Primary Care - Established  -     donepezil (Aricept) 5 mg tablet; Take 1 tablet (5 mg) by mouth once daily at bedtime.  -     Follow Up In Primary Care - [facility]; Future  Memory loss  -     Follow Up In Primary Care - Established  -     donepezil (Aricept) 5 mg tablet; Take 1 tablet (5 mg) by mouth once daily at bedtime.  -     Follow Up In Primary Care - [facility]; Future  Type 2 diabetes mellitus with hyperglycemia, without long-term current use of insulin (CMS/[facility])  -     lisinopril 10 mg tablet; Please take only 1 tablet by mouth every day.  Thank you.  -     glimepiride (Amaryl) 2 mg tablet; Please take 1 tablet by mouth before biggest meal of the day.  Do not take if you are not going to eat.  Thank you.  -     pravastatin (Pravachol) 20 mg tablet; Please take 1 tablet by mouth at bedtime.  Thank you.  -     Follow Up In Primary Care - [facility]; Future  Essential hypertension  -     lisinopril 10 mg tablet; Please take only 1 tablet by mouth every day.  Thank you.  -     Follow Up In Primary Care - [facility]; Future  Pure hypercholesterolemia with target low density lipoprotein (LDL) cholesterol less than 70 mg/dL  -     pravastatin (Pravachol) 20 mg tablet; Please take 1 tablet by mouth at bedtime.  Thank you.  -     Follow Up In Primary [facility] -  Established; Future  Vitamin B12 deficiency  -     Follow Up In Primary Care - Established; Future  Vitamin D deficiency  -     Follow Up In Primary Care - Established; Future  Mild late onset Alzheimer's dementia without behavioral disturbance, psychotic disturbance, mood disturbance, or anxiety (CMS/[facility])  -     donepezil (Aricept) 5 mg tablet; Take 1 tablet (5 mg) by mouth once daily at bedtime.  -     Follow Up In Primary Care - Established; Future  Type 2 diabetes mellitus with diabetic peripheral angiopathy without gangrene, without long-term current use of insulin (CMS/[facility])  -     glimepiride (Amaryl) 2 mg tablet; Please take 1 tablet by mouth before biggest meal of the day.  Do not take if you are not going to eat.  Thank you.  -     pravastatin (Pravachol) 20 mg tablet; Please take 1 tablet by mouth at bedtime.  Thank you.  -     Follow Up In Primary Care - Established; Future       Thank you much for coming.  I am very happy to see you.    Let us schedule you for your Medicare Wellness evaluation sometime soon.    Until then, I am very happy to hear that you are doing well.  I will refill your medications, to make sure that you do not run out of your regimens.    Please continue following with your specialists, including your endocrinologist, cardiologist, podiatrist, ophthalmologist, and please continue taking care of yourself and each other!    I am glad to hear that you are taking your VITAMIN D, vitamin B12, as well as your multivitamin.  All of these contribute to a stronger you with a sharp mind!    Also, please continue taking your DONEPEZIL at bedtime.    Let us change your LISINOPRIL to 10 mg.  You will only need to take 1 tablet by mouth every day.  Please throw out the lisinopril 5 mg tablets, so that there is no confusion.  Again, you only need 1 tablet please, because it will now be 10 mg.    Again, thank you very much for coming.  Please do not hesitate to call with questions or concerns.   Please continue to take care of yourself and your family, and each other, and please continue to pray for our recovery from this pandemic.  I hope you had a good [place], and I do wish you a happy new year!            0  Return in 6 weeks.  40 minutes please.  Medicare Wellness evaluation.  Reassess compliance, tolerance, mood, energy, function, cognition, rule out self-neglect, caregiver stress of daughter.  Coordinate with cardiology, endocrinology, podiatry, ophthalmology.  Review preventive strategies, cardiovascular risk.            0

## 2024-02-10 DIAGNOSIS — R41.89 COGNITIVE DECLINE: ICD-10-CM

## 2024-02-10 DIAGNOSIS — G30.1 MILD LATE ONSET ALZHEIMER'S DEMENTIA WITHOUT BEHAVIORAL DISTURBANCE, PSYCHOTIC DISTURBANCE, MOOD DISTURBANCE, OR ANXIETY (MULTI): ICD-10-CM

## 2024-02-10 DIAGNOSIS — R41.3 MEMORY LOSS: ICD-10-CM

## 2024-02-10 DIAGNOSIS — F02.A0 MILD LATE ONSET ALZHEIMER'S DEMENTIA WITHOUT BEHAVIORAL DISTURBANCE, PSYCHOTIC DISTURBANCE, MOOD DISTURBANCE, OR ANXIETY (MULTI): ICD-10-CM

## 2024-02-12 RX ORDER — DONEPEZIL HYDROCHLORIDE 5 MG/1
5 TABLET, FILM COATED ORAL NIGHTLY
Qty: 30 TABLET | Refills: 2 | Status: SHIPPED | OUTPATIENT
Start: 2024-02-12 | End: 2024-05-06 | Stop reason: SDUPTHER

## 2024-03-11 ENCOUNTER — OFFICE VISIT (OUTPATIENT)
Dept: PRIMARY CARE | Facility: CLINIC | Age: 84
End: 2024-03-11
Payer: MEDICARE

## 2024-03-11 VITALS
DIASTOLIC BLOOD PRESSURE: 68 MMHG | SYSTOLIC BLOOD PRESSURE: 144 MMHG | BODY MASS INDEX: 26.82 KG/M2 | WEIGHT: 124.3 LBS | OXYGEN SATURATION: 94 % | HEIGHT: 57 IN | HEART RATE: 75 BPM

## 2024-03-11 DIAGNOSIS — G30.1 MILD LATE ONSET ALZHEIMER'S DEMENTIA WITHOUT BEHAVIORAL DISTURBANCE, PSYCHOTIC DISTURBANCE, MOOD DISTURBANCE, OR ANXIETY (MULTI): ICD-10-CM

## 2024-03-11 DIAGNOSIS — E11.65 TYPE 2 DIABETES MELLITUS WITH HYPERGLYCEMIA, WITHOUT LONG-TERM CURRENT USE OF INSULIN (MULTI): ICD-10-CM

## 2024-03-11 DIAGNOSIS — R41.3 MEMORY LOSS: ICD-10-CM

## 2024-03-11 DIAGNOSIS — I10 ESSENTIAL HYPERTENSION: ICD-10-CM

## 2024-03-11 DIAGNOSIS — F02.A0 MILD LATE ONSET ALZHEIMER'S DEMENTIA WITHOUT BEHAVIORAL DISTURBANCE, PSYCHOTIC DISTURBANCE, MOOD DISTURBANCE, OR ANXIETY (MULTI): ICD-10-CM

## 2024-03-11 DIAGNOSIS — R41.89 COGNITIVE DECLINE: ICD-10-CM

## 2024-03-11 DIAGNOSIS — E78.00 PURE HYPERCHOLESTEROLEMIA WITH TARGET LOW DENSITY LIPOPROTEIN (LDL) CHOLESTEROL LESS THAN 70 MG/DL: ICD-10-CM

## 2024-03-11 DIAGNOSIS — Z00.00 ROUTINE GENERAL MEDICAL EXAMINATION AT HEALTH CARE FACILITY: Primary | ICD-10-CM

## 2024-03-11 DIAGNOSIS — E55.9 VITAMIN D DEFICIENCY: ICD-10-CM

## 2024-03-11 DIAGNOSIS — E53.8 VITAMIN B12 DEFICIENCY: ICD-10-CM

## 2024-03-11 DIAGNOSIS — E11.51 TYPE 2 DIABETES MELLITUS WITH DIABETIC PERIPHERAL ANGIOPATHY WITHOUT GANGRENE, WITHOUT LONG-TERM CURRENT USE OF INSULIN (MULTI): ICD-10-CM

## 2024-03-11 DIAGNOSIS — R01.1 HEART MURMUR, SYSTOLIC: ICD-10-CM

## 2024-03-11 PROCEDURE — G0439 PPPS, SUBSEQ VISIT: HCPCS | Performed by: INTERNAL MEDICINE

## 2024-03-11 PROCEDURE — 1036F TOBACCO NON-USER: CPT | Performed by: INTERNAL MEDICINE

## 2024-03-11 PROCEDURE — 1123F ACP DISCUSS/DSCN MKR DOCD: CPT | Performed by: INTERNAL MEDICINE

## 2024-03-11 PROCEDURE — 3078F DIAST BP <80 MM HG: CPT | Performed by: INTERNAL MEDICINE

## 2024-03-11 PROCEDURE — 99213 OFFICE O/P EST LOW 20 MIN: CPT | Performed by: INTERNAL MEDICINE

## 2024-03-11 PROCEDURE — 1160F RVW MEDS BY RX/DR IN RCRD: CPT | Performed by: INTERNAL MEDICINE

## 2024-03-11 PROCEDURE — 1159F MED LIST DOCD IN RCRD: CPT | Performed by: INTERNAL MEDICINE

## 2024-03-11 PROCEDURE — 1170F FXNL STATUS ASSESSED: CPT | Performed by: INTERNAL MEDICINE

## 2024-03-11 PROCEDURE — 3077F SYST BP >= 140 MM HG: CPT | Performed by: INTERNAL MEDICINE

## 2024-03-11 ASSESSMENT — PATIENT HEALTH QUESTIONNAIRE - PHQ9
2. FEELING DOWN, DEPRESSED OR HOPELESS: NOT AT ALL
1. LITTLE INTEREST OR PLEASURE IN DOING THINGS: NOT AT ALL
SUM OF ALL RESPONSES TO PHQ9 QUESTIONS 1 AND 2: 0

## 2024-03-11 ASSESSMENT — ENCOUNTER SYMPTOMS
LOSS OF SENSATION IN FEET: 0
OCCASIONAL FEELINGS OF UNSTEADINESS: 0
DEPRESSION: 0

## 2024-03-11 ASSESSMENT — ACTIVITIES OF DAILY LIVING (ADL)
GROCERY_SHOPPING: NEEDS ASSISTANCE
TAKING_MEDICATION: NEEDS ASSISTANCE
DOING_HOUSEWORK: NEEDS ASSISTANCE
DRESSING: INDEPENDENT
BATHING: INDEPENDENT
MANAGING_FINANCES: NEEDS ASSISTANCE

## 2024-03-11 NOTE — PROGRESS NOTES
Subjective   Reason for Visit: Marci Han is an 83 y.o. female here for a Medicare Wellness visit.     Past Medical, Surgical, and Family History reviewed and updated in chart.    Reviewed all medications by prescribing practitioner or clinical pharmacist (such as prescriptions, OTCs, herbal therapies and supplements) and documented in the medical record.    HPI  The patient seems to be doing well.  Compliant with medications, thanks to help of family members, including daughter/POA, Precious Ashraf.  Tolerating regimens.  No particular complaints.    Physically active, but no particular exercise regimens being followed.  Likewise, not following any specific diet, even with history of diabetes mellitus type 2.  ENDOCRINE with no polyuria, polydipsia, polyphagia.  No blurred vision.  No skin, hair, nail changes.  No dramatic weight loss or weight gain.      Appetite preserved, with no nausea, vomiting, abdominal distress.  No diarrhea, no constipation.  No apparent blood in stool.  No apparent weight loss.  No dysuria, flank, suprapubic pain.  No discharge, no pruritus.  No rash.  No malodor.  No hesitancy, no feeling of incomplete voiding.  No skin changes, rashes, pruritus, jaundice.  No easy bruisability.      No ashish chest pain.  No orthopnea, no paroxysmal nocturnal dyspnea.  No dizziness, diaphoresis, palpitations.  No loss of consciousness.  No bipedal edema.  No remarkable dyspnea on exertion.  No headache, blurred vision, diplopia.  No dysphagia.  Not worried about memory.  No recent falls.  No particular cough, no particular sputum production.  CONSTITUTIONALLY, no fever, no chills.  No night sweats.  No lingering anorexia or nausea.  No apparent lymphadenopathy.  No apparent weight loss.      Living will wishes, CODE STATUS, Medicare Wellness evaluation also done today, please see.    Patient Care Team:  Erich Nguyễn MD as PCP - General (Internal Medicine)         Review of Systems  Review of  "systems as in history of present illness, and otherwise, reviewed separately as well, and was unremarkable/negative/noncontributory.        Objective   Vitals:  /68 (BP Location: Left arm, Patient Position: Sitting, BP Cuff Size: Adult)   Pulse 75   Ht 1.448 m (4' 9\")   Wt 56.4 kg (124 lb 4.8 oz)   SpO2 94%   BMI 26.90 kg/m²       Physical Exam  In good spirits.  Not in distress or diaphoresis.  Receptive, oriented times person and place.  Does not resent direction from daughter.  Moderately built.  Not cachectic.  Appropriate.  Continues to want to maintain and improve quality of life.  Does not wish harm to self or others.    HEAD pink palpebral conjunctivae, anicteric sclerae.  Mucous membranes somewhat dry.  No tonsillopharyngeal congestion, no thrush.  NECK supple, no apparent jugular venous distention.  No carotid bruit.  CARDIOVASCULAR not in distress or diaphoresis.  No bipedal edema.  Systolic murmur noted.  No apparent change.  Seemingly regular rate and rhythm.  LUNGS not in distress or diaphoresis.  Not using accessory muscles.  Clear to auscultation bilaterally.  ABDOMEN soft, nontender.  BACK no costovertebral angle tenderness.  EXTREMITIES no clubbing, no cyanosis.  NEURO no facial asymmetry.  No apparent cranial nerve deficits.  Romberg negative.  Ambulating without need of assistance.  No apparent focal weakness.  No tremors.  PSYCH receptive, appropriate, and eager to maintain and improve quality of life.        LABORATORY results reviewed with patient.  Will need updating soon.        Assessment/Plan   Marci was seen today for medicare annual wellness visit initial.  Diagnoses and all orders for this visit:  Routine general medical examination at health care facility (Primary)  -     Follow Up In Primary Care - Established; Future  Cognitive decline  -     Follow Up In Primary Care - Established  -     Follow Up In Primary Care - Established; Future  -     Comprehensive Metabolic Panel; " Future  -     TSH with reflex to Free T4 if abnormal; Future  -     Urinalysis with Reflex Culture and Microscopic; Future  -     Hemoglobin A1C; Future  -     Vitamin B12; Future  -     Folate; Future  -     Vitamin D 25-Hydroxy,Total (for eval of Vitamin D levels); Future  Memory loss  -     Follow Up In Primary Care - Established; Future  -     Comprehensive Metabolic Panel; Future  -     TSH with reflex to Free T4 if abnormal; Future  -     Urinalysis with Reflex Culture and Microscopic; Future  -     Hemoglobin A1C; Future  -     Vitamin B12; Future  -     Folate; Future  -     Vitamin D 25-Hydroxy,Total (for eval of Vitamin D levels); Future  Type 2 diabetes mellitus with hyperglycemia, without long-term current use of insulin (CMS/[facility])  -     Follow Up In Primary Care - Established; Future  -     Comprehensive Metabolic Panel; Future  -     Urinalysis with Reflex Culture and Microscopic; Future  -     Hemoglobin A1C; Future  -     Albumin , Urine Random; Future  Essential hypertension  -     Follow Up In Primary Care - Established; Future  -     CBC and Auto Differential; Future  -     Comprehensive Metabolic Panel; Future  -     TSH with reflex to Free T4 if abnormal; Future  -     Magnesium; Future  -     Urinalysis with Reflex Culture and Microscopic; Future  Pure hypercholesterolemia with target low density lipoprotein (LDL) cholesterol less than 70 mg/dL  -     Follow Up In Primary Care - Established; Future  -     Comprehensive Metabolic Panel; Future  -     Lipid Panel; Future  Vitamin B12 deficiency  -     Follow Up In Primary Care - Established; Future  -     CBC and Auto Differential; Future  -     Vitamin B12; Future  -     Folate; Future  Vitamin D deficiency  -     Follow Up In Primary Care - Established; Future  -     Comprehensive Metabolic Panel; Future  -     Vitamin D 25-Hydroxy,Total (for eval of Vitamin D levels); Future  Mild late onset Alzheimer's dementia without behavioral disturbance,  psychotic disturbance, mood disturbance, or anxiety (CMS/[facility])  -     Follow Up In Primary Care - Established; Future  -     Comprehensive Metabolic Panel; Future  -     TSH with reflex to Free T4 if abnormal; Future  -     Urinalysis with Reflex Culture and Microscopic; Future  -     Hemoglobin A1C; Future  -     Vitamin B12; Future  -     Folate; Future  -     Vitamin D 25-Hydroxy,Total (for eval of Vitamin D levels); Future  Type 2 diabetes mellitus with diabetic peripheral angiopathy without gangrene, without long-term current use of insulin (CMS/[facility])  -     Follow Up In Primary Care - Established; Future  -     Comprehensive Metabolic Panel; Future  -     Urinalysis with Reflex Culture and Microscopic; Future  -     Hemoglobin A1C; Future  -     Albumin , Urine Random; Future  Heart murmur, systolic  -     Follow Up In Primary Care - Established; Future        Thank you very much for coming.  I am very happy to see you and your daughter!    Thank you for taking care of each other.  Please continue to take your medications regularly.  Let us do some FASTING laboratory examinations anytime soon.  I will call you with results and possible changes.    In the meantime, we did your Medicare Wellness evaluation today, and you are doing well.  Again, let us wait on your laboratory results.  I will send word regarding results and possible changes.  Please continue staying physically active, especially with lots of brisk walking!  Very good for your energy, and very good for your memory also!    Please continue eating regularly, and try her best to incorporate more fruits and vegetables in your regimens.  Please try to drink more fluids throughout the day.    We reviewed your LIVING WILL wishes and your CODE STATUS.  We will keep your DAUGHTER, Suyapa, as your DURABLE POWER OF  for healthcare matters.  This means that if you are unable to make decisions for yourself, she will be the 1 who will be making decisions  for you and she will be talking on your behalf.    Your CODE STATUS is FULL CODE, meaning that we will do everything that we can to save your life and to preserve it.  Afterwards, we will discuss how you are doing, and what your options are.  As always, we will try her best to keep you comfortable and to maintain your quality of life and your dignity.    Please come back in 2 months for your yearly COMPLETE physical examination.  Until then, please do your fasting laboratory examinations, and I will send word regarding results and possible changes.    Please continue to take care of yourself and each other, and please continue to pray for our recovery from this pandemic.  I hope you are having a blessed Lent, and I do wish you a happy Easter!  Aki Christensen!            0  Return in 2 months.  40 minutes please.  COMPLETE physical examination.  Review fasting laboratory results, mood, energy, function, cognition, rule out self-neglect, caregiver stress.  Coordinate with cardiology, endocrinology, podiatry, ophthalmology, as the patient is seen.  Review preventive strategies.            0

## 2024-03-11 NOTE — PATIENT INSTRUCTIONS
Thank you very much for coming.  I am very happy to see you and your daughter!    Thank you for taking care of each other.  Please continue to take your medications regularly.  Let us do some FASTING laboratory examinations anytime soon.  I will call you with results and possible changes.    In the meantime, we did your Medicare Wellness evaluation today, and you are doing well.  Again, let us wait on your laboratory results.  I will send word regarding results and possible changes.  Please continue staying physically active, especially with lots of brisk walking!  Very good for your energy, and very good for your memory also!    Please continue eating regularly, and try her best to incorporate more fruits and vegetables in your regimens.  Please try to drink more fluids throughout the day.    We reviewed your LIVING WILL wishes and your CODE STATUS.  We will keep your DAUGHTER, Suyapa, as your DURABLE POWER OF  for healthcare matters.  This means that if you are unable to make decisions for yourself, she will be the 1 who will be making decisions for you and she will be talking on your behalf.    Your CODE STATUS is FULL CODE, meaning that we will do everything that we can to save your life and to preserve it.  Afterwards, we will discuss how you are doing, and what your options are.  As always, we will try her best to keep you comfortable and to maintain your quality of life and your dignity.    Please come back in 2 months for your yearly COMPLETE physical examination.  Until then, please do your fasting laboratory examinations, and I will send word regarding results and possible changes.    Please continue to take care of yourself and each other, and please continue to pray for our recovery from this pandemic.  I hope you are having a blessed Lent, and I do wish you a happy Easter!  Aki Christensen!            0  Return in 2 months.  40 minutes please.  COMPLETE physical examination.  Review fasting  laboratory results, mood, energy, function, cognition, rule out self-neglect, caregiver stress.  Coordinate with cardiology, endocrinology, podiatry, ophthalmology, as the patient is seen.  Review preventive strategies.            0

## 2024-04-06 DIAGNOSIS — E11.51 TYPE 2 DIABETES MELLITUS WITH DIABETIC PERIPHERAL ANGIOPATHY WITHOUT GANGRENE, WITHOUT LONG-TERM CURRENT USE OF INSULIN (MULTI): ICD-10-CM

## 2024-04-06 DIAGNOSIS — E11.65 TYPE 2 DIABETES MELLITUS WITH HYPERGLYCEMIA, WITHOUT LONG-TERM CURRENT USE OF INSULIN (MULTI): ICD-10-CM

## 2024-04-09 RX ORDER — GLIMEPIRIDE 2 MG/1
TABLET ORAL
Qty: 30 TABLET | Refills: 1 | Status: SHIPPED | OUTPATIENT
Start: 2024-04-09 | End: 2024-05-07 | Stop reason: SDUPTHER

## 2024-04-14 ENCOUNTER — APPOINTMENT (OUTPATIENT)
Dept: CT IMAGING | Age: 84
DRG: 243 | End: 2024-04-14
Payer: MEDICARE

## 2024-04-14 ENCOUNTER — HOSPITAL ENCOUNTER (INPATIENT)
Age: 84
LOS: 1 days | Discharge: HOME | DRG: 243 | End: 2024-04-16
Attending: STUDENT IN AN ORGANIZED HEALTH CARE EDUCATION/TRAINING PROGRAM | Admitting: INTERNAL MEDICINE
Payer: MEDICARE

## 2024-04-14 ENCOUNTER — APPOINTMENT (OUTPATIENT)
Dept: GENERAL RADIOLOGY | Age: 84
DRG: 243 | End: 2024-04-14
Payer: MEDICARE

## 2024-04-14 DIAGNOSIS — R00.1 SYMPTOMATIC BRADYCARDIA: ICD-10-CM

## 2024-04-14 DIAGNOSIS — R42 DIZZINESS: Primary | ICD-10-CM

## 2024-04-14 DIAGNOSIS — I45.9 HEART BLOCK: ICD-10-CM

## 2024-04-14 DIAGNOSIS — N17.9 AKI (ACUTE KIDNEY INJURY) (HCC): ICD-10-CM

## 2024-04-14 DIAGNOSIS — R55 NEAR SYNCOPE: ICD-10-CM

## 2024-04-14 LAB
ALBUMIN SERPL-MCNC: 3.8 G/DL (ref 3.5–4.6)
ALP SERPL-CCNC: 84 U/L (ref 40–130)
ALT SERPL-CCNC: 23 U/L (ref 0–33)
ANION GAP SERPL CALCULATED.3IONS-SCNC: 8 MEQ/L (ref 9–15)
AST SERPL-CCNC: 28 U/L (ref 0–35)
BASOPHILS # BLD: 0 K/UL (ref 0–0.2)
BASOPHILS NFR BLD: 0.3 %
BILIRUB SERPL-MCNC: <0.2 MG/DL (ref 0.2–0.7)
BILIRUB UR QL STRIP: NEGATIVE
BNP BLD-MCNC: 1451 PG/ML
BUN SERPL-MCNC: 36 MG/DL (ref 8–23)
CALCIUM SERPL-MCNC: 9.3 MG/DL (ref 8.5–9.9)
CHLORIDE SERPL-SCNC: 103 MEQ/L (ref 95–107)
CK SERPL-CCNC: 95 U/L (ref 0–170)
CLARITY UR: CLEAR
CO2 SERPL-SCNC: 27 MEQ/L (ref 20–31)
COLOR UR: YELLOW
CREAT SERPL-MCNC: 1.02 MG/DL (ref 0.5–0.9)
EOSINOPHIL # BLD: 0.1 K/UL (ref 0–0.7)
EOSINOPHIL NFR BLD: 1.2 %
ERYTHROCYTE [DISTWIDTH] IN BLOOD BY AUTOMATED COUNT: 14.4 % (ref 11.5–14.5)
GLOBULIN SER CALC-MCNC: 3 G/DL (ref 2.3–3.5)
GLUCOSE BLD-MCNC: 87 MG/DL (ref 70–99)
GLUCOSE SERPL-MCNC: 228 MG/DL (ref 70–99)
GLUCOSE UR STRIP-MCNC: NEGATIVE MG/DL
HCT VFR BLD AUTO: 35.2 % (ref 37–47)
HGB BLD-MCNC: 11.4 G/DL (ref 12–16)
HGB UR QL STRIP: NEGATIVE
INFLUENZA A BY PCR: NEGATIVE
INFLUENZA B BY PCR: NEGATIVE
KETONES UR STRIP-MCNC: NEGATIVE MG/DL
LACTATE BLDV-SCNC: 1.5 MMOL/L (ref 0.5–2.2)
LEUKOCYTE ESTERASE UR QL STRIP: NEGATIVE
LYMPHOCYTES # BLD: 1.5 K/UL (ref 1–4.8)
LYMPHOCYTES NFR BLD: 24.6 %
MAGNESIUM SERPL-MCNC: 2 MG/DL (ref 1.7–2.4)
MCH RBC QN AUTO: 30.7 PG (ref 27–31.3)
MCHC RBC AUTO-ENTMCNC: 32.4 % (ref 33–37)
MCV RBC AUTO: 94.9 FL (ref 79.4–94.8)
MONOCYTES # BLD: 0.4 K/UL (ref 0.2–0.8)
MONOCYTES NFR BLD: 6.4 %
NEUTROPHILS # BLD: 4 K/UL (ref 1.4–6.5)
NEUTS SEG NFR BLD: 67.3 %
NITRITE UR QL STRIP: NEGATIVE
PERFORMED ON: NORMAL
PH UR STRIP: 7.5 [PH] (ref 5–9)
PLATELET # BLD AUTO: 176 K/UL (ref 130–400)
POTASSIUM SERPL-SCNC: 4.4 MEQ/L (ref 3.4–4.9)
PROT SERPL-MCNC: 6.8 G/DL (ref 6.3–8)
PROT UR STRIP-MCNC: NEGATIVE MG/DL
RBC # BLD AUTO: 3.71 M/UL (ref 4.2–5.4)
SARS-COV-2 RDRP RESP QL NAA+PROBE: NOT DETECTED
SODIUM SERPL-SCNC: 138 MEQ/L (ref 135–144)
SP GR UR STRIP: 1.01 (ref 1–1.03)
TROPONIN, HIGH SENSITIVITY: 22 NG/L (ref 0–19)
TROPONIN, HIGH SENSITIVITY: 23 NG/L (ref 0–19)
TROPONIN, HIGH SENSITIVITY: 8 NG/L (ref 0–19)
TSH REFLEX: 1.84 UIU/ML (ref 0.44–3.86)
URINE REFLEX TO CULTURE: NORMAL
UROBILINOGEN UR STRIP-ACNC: 0.2 E.U./DL
WBC # BLD AUTO: 5.9 K/UL (ref 4.8–10.8)

## 2024-04-14 PROCEDURE — 87040 BLOOD CULTURE FOR BACTERIA: CPT

## 2024-04-14 PROCEDURE — 93005 ELECTROCARDIOGRAM TRACING: CPT | Performed by: STUDENT IN AN ORGANIZED HEALTH CARE EDUCATION/TRAINING PROGRAM

## 2024-04-14 PROCEDURE — 81003 URINALYSIS AUTO W/O SCOPE: CPT

## 2024-04-14 PROCEDURE — 99285 EMERGENCY DEPT VISIT HI MDM: CPT

## 2024-04-14 PROCEDURE — 84484 ASSAY OF TROPONIN QUANT: CPT

## 2024-04-14 PROCEDURE — 36415 COLL VENOUS BLD VENIPUNCTURE: CPT

## 2024-04-14 PROCEDURE — 83605 ASSAY OF LACTIC ACID: CPT

## 2024-04-14 PROCEDURE — G0378 HOSPITAL OBSERVATION PER HR: HCPCS

## 2024-04-14 PROCEDURE — 2580000003 HC RX 258: Performed by: INTERNAL MEDICINE

## 2024-04-14 PROCEDURE — 87502 INFLUENZA DNA AMP PROBE: CPT

## 2024-04-14 PROCEDURE — 2580000003 HC RX 258: Performed by: STUDENT IN AN ORGANIZED HEALTH CARE EDUCATION/TRAINING PROGRAM

## 2024-04-14 PROCEDURE — 80053 COMPREHEN METABOLIC PANEL: CPT

## 2024-04-14 PROCEDURE — 6360000002 HC RX W HCPCS: Performed by: INTERNAL MEDICINE

## 2024-04-14 PROCEDURE — 82550 ASSAY OF CK (CPK): CPT

## 2024-04-14 PROCEDURE — 71045 X-RAY EXAM CHEST 1 VIEW: CPT

## 2024-04-14 PROCEDURE — 6370000000 HC RX 637 (ALT 250 FOR IP): Performed by: INTERNAL MEDICINE

## 2024-04-14 PROCEDURE — 83735 ASSAY OF MAGNESIUM: CPT

## 2024-04-14 PROCEDURE — 96360 HYDRATION IV INFUSION INIT: CPT

## 2024-04-14 PROCEDURE — 87635 SARS-COV-2 COVID-19 AMP PRB: CPT

## 2024-04-14 PROCEDURE — 70450 CT HEAD/BRAIN W/O DYE: CPT

## 2024-04-14 PROCEDURE — 83880 ASSAY OF NATRIURETIC PEPTIDE: CPT

## 2024-04-14 PROCEDURE — 84443 ASSAY THYROID STIM HORMONE: CPT

## 2024-04-14 PROCEDURE — 96372 THER/PROPH/DIAG INJ SC/IM: CPT

## 2024-04-14 PROCEDURE — 85025 COMPLETE CBC W/AUTO DIFF WBC: CPT

## 2024-04-14 RX ORDER — ACETAMINOPHEN 325 MG/1
650 TABLET ORAL EVERY 6 HOURS PRN
Status: DISCONTINUED | OUTPATIENT
Start: 2024-04-14 | End: 2024-04-16 | Stop reason: HOSPADM

## 2024-04-14 RX ORDER — ACETAMINOPHEN 650 MG/1
650 SUPPOSITORY RECTAL EVERY 6 HOURS PRN
Status: DISCONTINUED | OUTPATIENT
Start: 2024-04-14 | End: 2024-04-16 | Stop reason: HOSPADM

## 2024-04-14 RX ORDER — INSULIN LISPRO 100 [IU]/ML
0-4 INJECTION, SOLUTION INTRAVENOUS; SUBCUTANEOUS NIGHTLY
Status: DISCONTINUED | OUTPATIENT
Start: 2024-04-14 | End: 2024-04-16 | Stop reason: HOSPADM

## 2024-04-14 RX ORDER — POTASSIUM CHLORIDE 7.45 MG/ML
10 INJECTION INTRAVENOUS PRN
Status: DISCONTINUED | OUTPATIENT
Start: 2024-04-14 | End: 2024-04-16 | Stop reason: HOSPADM

## 2024-04-14 RX ORDER — MECOBALAMIN 5000 MCG
5 TABLET,DISINTEGRATING ORAL NIGHTLY
Status: DISCONTINUED | OUTPATIENT
Start: 2024-04-14 | End: 2024-04-16 | Stop reason: HOSPADM

## 2024-04-14 RX ORDER — ONDANSETRON 4 MG/1
4 TABLET, ORALLY DISINTEGRATING ORAL EVERY 8 HOURS PRN
Status: DISCONTINUED | OUTPATIENT
Start: 2024-04-14 | End: 2024-04-16 | Stop reason: HOSPADM

## 2024-04-14 RX ORDER — GLUCAGON 1 MG/ML
1 KIT INJECTION PRN
Status: DISCONTINUED | OUTPATIENT
Start: 2024-04-14 | End: 2024-04-16 | Stop reason: HOSPADM

## 2024-04-14 RX ORDER — INSULIN LISPRO 100 [IU]/ML
0-4 INJECTION, SOLUTION INTRAVENOUS; SUBCUTANEOUS
Status: DISCONTINUED | OUTPATIENT
Start: 2024-04-15 | End: 2024-04-16 | Stop reason: HOSPADM

## 2024-04-14 RX ORDER — 0.9 % SODIUM CHLORIDE 0.9 %
1000 INTRAVENOUS SOLUTION INTRAVENOUS ONCE
Status: COMPLETED | OUTPATIENT
Start: 2024-04-14 | End: 2024-04-14

## 2024-04-14 RX ORDER — SODIUM CHLORIDE 0.9 % (FLUSH) 0.9 %
5-40 SYRINGE (ML) INJECTION PRN
Status: DISCONTINUED | OUTPATIENT
Start: 2024-04-14 | End: 2024-04-16 | Stop reason: HOSPADM

## 2024-04-14 RX ORDER — ONDANSETRON 2 MG/ML
4 INJECTION INTRAMUSCULAR; INTRAVENOUS EVERY 6 HOURS PRN
Status: DISCONTINUED | OUTPATIENT
Start: 2024-04-14 | End: 2024-04-16 | Stop reason: HOSPADM

## 2024-04-14 RX ORDER — DEXTROSE MONOHYDRATE 100 MG/ML
INJECTION, SOLUTION INTRAVENOUS CONTINUOUS PRN
Status: DISCONTINUED | OUTPATIENT
Start: 2024-04-14 | End: 2024-04-16 | Stop reason: HOSPADM

## 2024-04-14 RX ORDER — DONEPEZIL HYDROCHLORIDE 5 MG/1
5 TABLET, FILM COATED ORAL NIGHTLY
COMMUNITY

## 2024-04-14 RX ORDER — MAGNESIUM SULFATE IN WATER 40 MG/ML
2000 INJECTION, SOLUTION INTRAVENOUS PRN
Status: DISCONTINUED | OUTPATIENT
Start: 2024-04-14 | End: 2024-04-16 | Stop reason: HOSPADM

## 2024-04-14 RX ORDER — POTASSIUM CHLORIDE 20 MEQ/1
40 TABLET, EXTENDED RELEASE ORAL PRN
Status: DISCONTINUED | OUTPATIENT
Start: 2024-04-14 | End: 2024-04-16 | Stop reason: HOSPADM

## 2024-04-14 RX ORDER — POLYETHYLENE GLYCOL 3350 17 G/17G
17 POWDER, FOR SOLUTION ORAL DAILY PRN
Status: DISCONTINUED | OUTPATIENT
Start: 2024-04-14 | End: 2024-04-16 | Stop reason: HOSPADM

## 2024-04-14 RX ORDER — SODIUM CHLORIDE 0.9 % (FLUSH) 0.9 %
5-40 SYRINGE (ML) INJECTION EVERY 12 HOURS SCHEDULED
Status: DISCONTINUED | OUTPATIENT
Start: 2024-04-14 | End: 2024-04-16 | Stop reason: HOSPADM

## 2024-04-14 RX ORDER — ENOXAPARIN SODIUM 100 MG/ML
40 INJECTION SUBCUTANEOUS DAILY
Status: DISCONTINUED | OUTPATIENT
Start: 2024-04-14 | End: 2024-04-16 | Stop reason: HOSPADM

## 2024-04-14 RX ORDER — SODIUM CHLORIDE 9 MG/ML
INJECTION, SOLUTION INTRAVENOUS PRN
Status: DISCONTINUED | OUTPATIENT
Start: 2024-04-14 | End: 2024-04-16 | Stop reason: HOSPADM

## 2024-04-14 RX ADMIN — Medication 5 MG: at 23:06

## 2024-04-14 RX ADMIN — ENOXAPARIN SODIUM 40 MG: 100 INJECTION SUBCUTANEOUS at 20:28

## 2024-04-14 RX ADMIN — SODIUM CHLORIDE 1000 ML: 9 INJECTION, SOLUTION INTRAVENOUS at 15:43

## 2024-04-14 RX ADMIN — SODIUM CHLORIDE, PRESERVATIVE FREE 10 ML: 5 INJECTION INTRAVENOUS at 20:28

## 2024-04-14 ASSESSMENT — PAIN - FUNCTIONAL ASSESSMENT
PAIN_FUNCTIONAL_ASSESSMENT: NONE - DENIES PAIN

## 2024-04-14 ASSESSMENT — LIFESTYLE VARIABLES
HOW OFTEN DO YOU HAVE A DRINK CONTAINING ALCOHOL: NEVER
HOW MANY STANDARD DRINKS CONTAINING ALCOHOL DO YOU HAVE ON A TYPICAL DAY: PATIENT DOES NOT DRINK

## 2024-04-14 NOTE — ED PROVIDER NOTES
[facility] ED  eMERGENCY dEPARTMENT eNCOUnter      Pt Name: Dolores Lopez  MRN: [id number]  Birthdate 1940  Date of evaluation: 4/14/2024  Provider: Sky Pineda MD  Note Started: 4/14/24 5:20 PM EDT    HISTORY OF PRESENT ILLNESS      Chief Complaint   Patient presents with    Dizziness     Pt arrived via ems, c/o dizziness and weakness. Denies any pain. Heart rate decreases intermittently per ems, 2nd degree heart block       The history is provided by the Patient and EMS.  Dolores Lopez is a 83 y.o. female with a PMH clinically significant for  chronic inflammatory demyelinating poly radiculoneuropathy, medication non-compliance, HTN, HLD, DM II, CAD, Obesity, PUD, and Dementia presenting to the ED via EMS c/o lightheadedness, dizziness, generalized fatigue, SOB, nausea without vomiting and feeling like she is going to faint which has been ongoing intermittently over the past 3-4 days.  EMS report patient ill-appearing upon arrival with initial rhythm appearing to be second-degree heart block with heart rate going slow as the high 30s.  Reported patient did have low blood pressure at that time.  Spontaneously increased however.  Was bouncing between 30s and the 60s.  Patient states she did have some palpitations.  EMS did not administer any medications in route. Patient states she has not had symptoms like this in the past.  Denies any associated: Fevers, Chills, HA, Numbness, Focal Weakness, Cough, CP, Orthopnea, New or worsening BLE Edema/pain, Abd pain, Vomiting, Diarrhea, Constipation, Dysuria, Hematuria, or Difficulty urinating.  No SOB at this time.  Precipitating Factors: None.     Per Chart Review: PMH as noted above obtained via outpatient chart review.       REVIEW OF SYSTEMS       Review of Systems  Otherwise as noted in HPI    PAST MEDICAL HISTORY     Past Medical History:   Diagnosis Date    Diabetes (HCC)     Dyspnea     Eczema     Fractures     Hyperlipidemia

## 2024-04-14 NOTE — CARE COORDINATION
Case Management Assessment  Initial Evaluation    Date/Time of Evaluation: 4/14/2024 5:34 PM  Assessment Completed by: Edna Linda, RN, BSN    If patient is discharged prior to next notation, then this note serves as note for discharge by case management.    Patient Name: Dolores Lopez                   YOB: 1940  Diagnosis: No admission diagnoses are documented for this encounter.                   Date / Time: 4/14/2024  1:07 PM    Patient Admission Status: Emergency   Readmission Risk (Low < 19, Mod (19-27), High > 27): No data recorded  Current PCP: Jaydon Wright MD  PCP verified by CM? Yes    Chart Reviewed: Yes      History Provided by: Patient, Child/Family (HER SON REINA)  Patient Orientation: Alert and Oriented, Person, Place, Situation, Self    Patient Cognition: Alert    Hospitalization in the last 30 days (Readmission):  No    If yes, Readmission Assessment in  Navigator will be completed.    Advance Directives:      Code Status: Not on file   Patient's Primary Decision Maker is: Legal Next of Kin      Discharge Planning:    Patient lives with: Spouse/Significant Other Type of Home: Apartment  Primary Care Giver: Self  Patient Support Systems include: Children, Spouse/Significant Other   Current Financial resources: Medicare  Current community resources: None  Current services prior to admission: None            Current DME:  DIABETIC TESTING [employer]PPLIES            Type of Home Care services:  None    ADLS  Prior functional level: Independent in ADLs/IADLs  Current functional level: Independent in ADLs/IADLs      Family can provide assistance at DC: Yes  Would you like Case Management to discuss the discharge plan with any other family members/significant others, and if so, who? Yes (SON OR DTR)  Plans to Return to Present Housing: Yes  Other Identified Issues/Barriers to RETURNING to current housing: RETURN TO NORMAL ACTIVITIES WHILE DETERMINING CAUSE & ELIMINATING

## 2024-04-14 NOTE — ED NOTES
Called tele monitor room and verified tele pack over the phone at bedside, tech states everything matches up correctly and pt is good to go! I also nioticed that IV was signed and dated like the floor would like us to do from now on. The pt is lying bed with son at bedside and they are both doing so great and they are super excited to get to the floor!!!

## 2024-04-14 NOTE — ED NOTES
Pt resting in bed, no resp distress noted, resp even and unlabored, denies any dizziness, vitals obtained, family at bedside, call light on pt's lap, denies any needs.

## 2024-04-14 NOTE — H&P
HISTORY AND PHYSICAL             Date: 4/14/2024        Patient Name: Dolores Lopez     YOB: 1940      Age:  83 y.o.    Chief Complaint     Chief Complaint   Patient presents with    Dizziness     Pt arrived via ems, c/o dizziness and weakness. Denies any pain. Heart rate decreases intermittently per ems, 2nd degree heart block          History Obtained From   patient, electronic medical record    History of Present Illness   Patient is a 84-year-old Panamanian-speaking female with a past medical history of HTN, HLD, type 2 diabetes, CAD, obesity, PVD, dementia who presents for complaints of lightheadedness, dizziness, shortness of breath, fatigue, nausea vomiting for the last 3 to 4 days.  However during assessment patient denies symptoms.  Reporting that she feels fine.  Son at bedside reports that mother has dementia.  Patient is forgetful.  She appears to be alert and oriented x 3 answers in response to questions appropriately.  No overt distress noted.  BP elevated 154/60 SpO2 98% on room air.  CT of the head with no acute abnormality, respiratory panel negative, urine negative, CXR negative for acute process.  Blood cultures pending.  EKG sinus rhythm with marked sinus arrhythmia; anterior infarct age undetermined.  Patient denies chest pain, palpitations, headache, dizziness, shortness of breath, cough, fever, chills, N/V/D, and changes in appetite.      Past Medical History     Past Medical History:   Diagnosis Date    Diabetes (HCC)     Dyspnea     Eczema     Fractures     Hyperlipidemia     Hypertension         Past Surgical History     Past Surgical History:   Procedure Laterality Date    HYSTERECTOMY, TOTAL ABDOMINAL (CERVIX REMOVED)          Medications Prior to Admission     Prior to Admission medications    Medication Sig Start Date End Date Taking? Authorizing Provider   ONETOUCH ULTRA strip TEST 3 X DAILY E11.65 8/28/23   Emil Myers MD   aspirin 81 MG chewable tablet Take by mouth    g/dL    Hematocrit 35.2 (L) 37.0 - 47.0 %    MCV 94.9 (H) 79.4 - 94.8 fL    MCH 30.7 27.0 - 31.3 pg    MCHC 32.4 (L) 33.0 - 37.0 %    RDW 14.4 11.5 - 14.5 %    Platelets 176 130 - 400 K/uL    Neutrophils % 67.3 %    Lymphocytes % 24.6 %    Monocytes % 6.4 %    Eosinophils % 1.2 %    Basophils % 0.3 %    Neutrophils Absolute 4.0 1.4 - 6.5 K/uL    Lymphocytes Absolute 1.5 1.0 - 4.8 K/uL    Monocytes Absolute 0.4 0.2 - 0.8 K/uL    Eosinophils Absolute 0.1 0.0 - 0.7 K/uL    Basophils Absolute 0.0 0.0 - 0.2 K/uL   Troponin    Collection Time: 04/14/24  1:15 PM   Result Value Ref Range    Troponin, High Sensitivity 23 (H) 0 - 19 ng/L   Brain Natriuretic Peptide    Collection Time: 04/14/24  1:15 PM   Result Value Ref Range    Pro-BNP 1,451 pg/mL   CK    Collection Time: 04/14/24  1:15 PM   Result Value Ref Range    Total CK 95 0 - 170 U/L   TSH with Reflex    Collection Time: 04/14/24  1:15 PM   Result Value Ref Range    TSH 1.840 0.440 - 3.860 uIU/mL   Urinalysis with Reflex to Culture    Collection Time: 04/14/24  1:15 PM    Specimen: Urine, clean catch   Result Value Ref Range    Color, UA Yellow Straw/Yellow    Clarity, UA Clear Clear    Glucose, Ur Negative Negative mg/dL    Bilirubin Urine Negative Negative    Ketones, Urine Negative Negative mg/dL    Specific Gravity, UA 1.010 1.005 - 1.030    Blood, Urine Negative Negative    pH, UA 7.5 5.0 - 9.0    Protein, UA Negative Negative mg/dL    Urobilinogen, Urine 0.2 <2.0 E.U./dL    Nitrite, Urine Negative Negative    Leukocyte Esterase, Urine Negative Negative    Urine Reflex to Culture Not Indicated    COVID-19, Rapid    Collection Time: 04/14/24  1:23 PM    Specimen: Nasopharyngeal Swab   Result Value Ref Range    SARS-CoV-2, NAAT Not Detected Not Detected   Rapid influenza A/B antigens    Collection Time: 04/14/24  1:23 PM    Specimen: Nasopharyngeal   Result Value Ref Range    Influenza A by PCR Negative     Influenza B by PCR Negative    Troponin    Collection

## 2024-04-14 NOTE — ED NOTES
[facility] ED  Faculty Attestation    I performed a history and physical examination of the patient and discussed management with the resident. I reviewed the residents note and agree with the documented findings and plan of care. Any areas of disagreement are noted on the chart. I was personally present for the key portions of any procedures and the inclusive time noted in any critical care statement. I have documented in the chart those procedures where I was not present during the key portions. I have reviewed the emergency nurses triage note. I agree with the chief complaint, past medical history, past surgical history, allergies, medications, social, and family history as documented unless otherwise noted below.        This is a 25-year-old male who presents to the emergency department for suture removal  Patient had stitches placed in his right hand post injury 1 week ago today  Patient denies any discomfort  No bleeding or drainage  No fever  He denies any distal paresthesias or any other concerns at this time  Review of systems otherwise negative    On examination he appears in no acute distress  Vital signs and nursing notes reviewed  Recent ER visit reviewed  Patient has normal range of motion of the hand and fingers  Healing laceration  No wound dehiscence  No erythema, bleeding, drainage, or evidence for infection    Sutures removed by the resident  Stable for outpatient follow-up      Elpidio Mejia DO  Attending Emergency Physician        Jaden Cuevas DO  03/02/22 [id number] Pt to CT with Nurse, monitor, and Tech

## 2024-04-15 ENCOUNTER — APPOINTMENT (OUTPATIENT)
Dept: GENERAL RADIOLOGY | Age: 84
DRG: 243 | End: 2024-04-15
Payer: MEDICARE

## 2024-04-15 ENCOUNTER — APPOINTMENT (OUTPATIENT)
Age: 84
DRG: 243 | End: 2024-04-15
Attending: INTERNAL MEDICINE
Payer: MEDICARE

## 2024-04-15 PROBLEM — R41.0 CONFUSION: Status: ACTIVE | Noted: 2024-04-15

## 2024-04-15 LAB
ANION GAP SERPL CALCULATED.3IONS-SCNC: 11 MEQ/L (ref 9–15)
BASOPHILS # BLD: 0 K/UL (ref 0–0.2)
BASOPHILS NFR BLD: 0.3 %
BUN SERPL-MCNC: 21 MG/DL (ref 8–23)
CALCIUM SERPL-MCNC: 9.1 MG/DL (ref 8.5–9.9)
CHLORIDE SERPL-SCNC: 109 MEQ/L (ref 95–107)
CO2 SERPL-SCNC: 22 MEQ/L (ref 20–31)
CREAT SERPL-MCNC: 0.68 MG/DL (ref 0.5–0.9)
ECHO AO ROOT DIAM: 2.8 CM
ECHO AO ROOT INDEX: 1.85 CM/M2
ECHO AV AREA PEAK VELOCITY: 0.9 CM2
ECHO AV AREA VTI: 1 CM2
ECHO AV AREA/BSA PEAK VELOCITY: 0.6 CM2/M2
ECHO AV AREA/BSA VTI: 0.7 CM2/M2
ECHO AV CUSP MM: 1.3 CM
ECHO AV MEAN GRADIENT: 14 MMHG
ECHO AV MEAN VELOCITY: 1.8 M/S
ECHO AV PEAK GRADIENT: 26 MMHG
ECHO AV PEAK VELOCITY: 2.9 M/S
ECHO AV VELOCITY RATIO: 0.48
ECHO AV VTI: 56.8 CM
ECHO BSA: 1.54 M2
ECHO BSA: 1.54 M2
ECHO LA DIAMETER INDEX: 1.99 CM/M2
ECHO LA DIAMETER: 3 CM
ECHO LA TO AORTIC ROOT RATIO: 1.07
ECHO LA VOL A-L A2C: 71 ML (ref 22–52)
ECHO LA VOL A-L A4C: 56 ML (ref 22–52)
ECHO LA VOL MOD A2C: 69 ML (ref 22–52)
ECHO LA VOL MOD A4C: 53 ML (ref 22–52)
ECHO LA VOLUME AREA LENGTH: 65 ML
ECHO LA VOLUME INDEX A-L A2C: 47 ML/M2 (ref 16–34)
ECHO LA VOLUME INDEX A-L A4C: 37 ML/M2 (ref 16–34)
ECHO LA VOLUME INDEX AREA LENGTH: 43 ML/M2 (ref 16–34)
ECHO LA VOLUME INDEX MOD A2C: 46 ML/M2 (ref 16–34)
ECHO LA VOLUME INDEX MOD A4C: 35 ML/M2 (ref 16–34)
ECHO LV EDV A2C: 66 ML
ECHO LV EDV A4C: 68 ML
ECHO LV EDV BP: 68 ML (ref 56–104)
ECHO LV EDV INDEX A4C: 45 ML/M2
ECHO LV EDV INDEX BP: 45 ML/M2
ECHO LV EDV NDEX A2C: 44 ML/M2
ECHO LV EJECTION FRACTION A2C: 62 %
ECHO LV EJECTION FRACTION A4C: 79 %
ECHO LV EJECTION FRACTION BIPLANE: 71 % (ref 55–100)
ECHO LV ESV A2C: 25 ML
ECHO LV ESV A4C: 15 ML
ECHO LV ESV BP: 20 ML (ref 19–49)
ECHO LV ESV INDEX A2C: 17 ML/M2
ECHO LV ESV INDEX A4C: 10 ML/M2
ECHO LV ESV INDEX BP: 13 ML/M2
ECHO LV FRACTIONAL SHORTENING: 40 % (ref 28–44)
ECHO LV INTERNAL DIMENSION DIASTOLE INDEX: 2.78 CM/M2
ECHO LV INTERNAL DIMENSION DIASTOLIC: 4.2 CM (ref 3.9–5.3)
ECHO LV INTERNAL DIMENSION SYSTOLIC INDEX: 1.66 CM/M2
ECHO LV INTERNAL DIMENSION SYSTOLIC: 2.5 CM
ECHO LV IVSD: 1 CM (ref 0.6–0.9)
ECHO LV IVSS: 1.2 CM
ECHO LV MASS 2D: 137.2 G (ref 67–162)
ECHO LV MASS INDEX 2D: 90.9 G/M2 (ref 43–95)
ECHO LV POSTERIOR WALL DIASTOLIC: 1 CM (ref 0.6–0.9)
ECHO LV POSTERIOR WALL SYSTOLIC: 1.1 CM
ECHO LV RELATIVE WALL THICKNESS RATIO: 0.48
ECHO LVOT AREA: 1.8 CM2
ECHO LVOT AV VTI INDEX: 0.54
ECHO LVOT DIAM: 1.5 CM
ECHO LVOT MEAN GRADIENT: 4 MMHG
ECHO LVOT PEAK GRADIENT: 7 MMHG
ECHO LVOT PEAK VELOCITY: 1.4 M/S
ECHO LVOT STROKE VOLUME INDEX: 35.8 ML/M2
ECHO LVOT SV: 54 ML
ECHO LVOT VTI: 30.6 CM
ECHO MV A VELOCITY: 2.13 M/S
ECHO MV AREA PHT: 1.5 CM2
ECHO MV AREA VTI: 0.8 CM2
ECHO MV E DECELERATION TIME (DT): 521.5 MS
ECHO MV E VELOCITY: 1.51 M/S
ECHO MV E/A RATIO: 0.71
ECHO MV LVOT VTI INDEX: 2.24
ECHO MV MAX VELOCITY: 2.3 M/S
ECHO MV MEAN GRADIENT: 10 MMHG
ECHO MV MEAN VELOCITY: 1.5 M/S
ECHO MV PEAK GRADIENT: 21 MMHG
ECHO MV PRESSURE HALF TIME (PHT): 142.7 MS
ECHO MV VTI: 68.5 CM
ECHO RV INTERNAL DIMENSION: 2.6 CM
ECHO RV TAPSE: 2.4 CM (ref 1.7–?)
EKG ATRIAL RATE: 79 BPM
EKG P AXIS: 49 DEGREES
EKG P-R INTERVAL: 184 MS
EKG Q-T INTERVAL: 430 MS
EKG QRS DURATION: 116 MS
EKG QTC CALCULATION (BAZETT): 493 MS
EKG R AXIS: 11 DEGREES
EKG T AXIS: 49 DEGREES
EKG VENTRICULAR RATE: 79 BPM
EOSINOPHIL # BLD: 0.1 K/UL (ref 0–0.7)
EOSINOPHIL NFR BLD: 1.7 %
ERYTHROCYTE [DISTWIDTH] IN BLOOD BY AUTOMATED COUNT: 14.4 % (ref 11.5–14.5)
GLUCOSE BLD-MCNC: 153 MG/DL (ref 70–99)
GLUCOSE BLD-MCNC: 161 MG/DL (ref 70–99)
GLUCOSE BLD-MCNC: 168 MG/DL (ref 70–99)
GLUCOSE BLD-MCNC: 182 MG/DL (ref 70–99)
GLUCOSE BLD-MCNC: 184 MG/DL (ref 70–99)
GLUCOSE SERPL-MCNC: 177 MG/DL (ref 70–99)
HCT VFR BLD AUTO: 37 % (ref 37–47)
HGB BLD-MCNC: 12.1 G/DL (ref 12–16)
LYMPHOCYTES # BLD: 1.5 K/UL (ref 1–4.8)
LYMPHOCYTES NFR BLD: 25.4 %
MCH RBC QN AUTO: 30.4 PG (ref 27–31.3)
MCHC RBC AUTO-ENTMCNC: 32.7 % (ref 33–37)
MCV RBC AUTO: 93 FL (ref 79.4–94.8)
MONOCYTES # BLD: 0.3 K/UL (ref 0.2–0.8)
MONOCYTES NFR BLD: 5.4 %
NEUTROPHILS # BLD: 3.9 K/UL (ref 1.4–6.5)
NEUTS SEG NFR BLD: 67 %
PERFORMED ON: ABNORMAL
PLATELET # BLD AUTO: 185 K/UL (ref 130–400)
POTASSIUM SERPL-SCNC: 4.1 MEQ/L (ref 3.4–4.9)
RBC # BLD AUTO: 3.98 M/UL (ref 4.2–5.4)
SODIUM SERPL-SCNC: 142 MEQ/L (ref 135–144)
WBC # BLD AUTO: 5.8 K/UL (ref 4.8–10.8)

## 2024-04-15 PROCEDURE — 99222 1ST HOSP IP/OBS MODERATE 55: CPT | Performed by: PSYCHIATRY & NEUROLOGY

## 2024-04-15 PROCEDURE — C8929 TTE W OR WO FOL WCON,DOPPLER: HCPCS

## 2024-04-15 PROCEDURE — 6360000002 HC RX W HCPCS: Performed by: INTERNAL MEDICINE

## 2024-04-15 PROCEDURE — 2580000003 HC RX 258: Performed by: INTERNAL MEDICINE

## 2024-04-15 PROCEDURE — 93005 ELECTROCARDIOGRAM TRACING: CPT | Performed by: NURSE PRACTITIONER

## 2024-04-15 PROCEDURE — 33208 INSRT HEART PM ATRIAL & VENT: CPT | Performed by: INTERNAL MEDICINE

## 2024-04-15 PROCEDURE — 51798 US URINE CAPACITY MEASURE: CPT

## 2024-04-15 PROCEDURE — 6370000000 HC RX 637 (ALT 250 FOR IP): Performed by: INTERNAL MEDICINE

## 2024-04-15 PROCEDURE — 02H63JZ INSERTION OF PACEMAKER LEAD INTO RIGHT ATRIUM, PERCUTANEOUS APPROACH: ICD-10-PCS | Performed by: INTERNAL MEDICINE

## 2024-04-15 PROCEDURE — C1894 INTRO/SHEATH, NON-LASER: HCPCS | Performed by: INTERNAL MEDICINE

## 2024-04-15 PROCEDURE — C1892 INTRO/SHEATH,FIXED,PEEL-AWAY: HCPCS | Performed by: INTERNAL MEDICINE

## 2024-04-15 PROCEDURE — 93010 ELECTROCARDIOGRAM REPORT: CPT | Performed by: INTERNAL MEDICINE

## 2024-04-15 PROCEDURE — 2500000003 HC RX 250 WO HCPCS: Performed by: INTERNAL MEDICINE

## 2024-04-15 PROCEDURE — 80048 BASIC METABOLIC PNL TOTAL CA: CPT

## 2024-04-15 PROCEDURE — 6370000000 HC RX 637 (ALT 250 FOR IP): Performed by: STUDENT IN AN ORGANIZED HEALTH CARE EDUCATION/TRAINING PROGRAM

## 2024-04-15 PROCEDURE — 02HK3JZ INSERTION OF PACEMAKER LEAD INTO RIGHT VENTRICLE, PERCUTANEOUS APPROACH: ICD-10-PCS | Performed by: INTERNAL MEDICINE

## 2024-04-15 PROCEDURE — C1785 PMKR, DUAL, RATE-RESP: HCPCS | Performed by: INTERNAL MEDICINE

## 2024-04-15 PROCEDURE — 36415 COLL VENOUS BLD VENIPUNCTURE: CPT

## 2024-04-15 PROCEDURE — 99152 MOD SED SAME PHYS/QHP 5/>YRS: CPT | Performed by: INTERNAL MEDICINE

## 2024-04-15 PROCEDURE — 2060000000 HC ICU INTERMEDIATE R&B

## 2024-04-15 PROCEDURE — 2709999900 HC NON-CHARGEABLE SUPPLY: Performed by: INTERNAL MEDICINE

## 2024-04-15 PROCEDURE — 99153 MOD SED SAME PHYS/QHP EA: CPT | Performed by: INTERNAL MEDICINE

## 2024-04-15 PROCEDURE — 85025 COMPLETE CBC W/AUTO DIFF WBC: CPT

## 2024-04-15 PROCEDURE — 0JH606Z INSERTION OF PACEMAKER, DUAL CHAMBER INTO CHEST SUBCUTANEOUS TISSUE AND FASCIA, OPEN APPROACH: ICD-10-PCS | Performed by: INTERNAL MEDICINE

## 2024-04-15 PROCEDURE — 71045 X-RAY EXAM CHEST 1 VIEW: CPT

## 2024-04-15 PROCEDURE — 6360000004 HC RX CONTRAST MEDICATION: Performed by: INTERNAL MEDICINE

## 2024-04-15 PROCEDURE — 93005 ELECTROCARDIOGRAM TRACING: CPT | Performed by: INTERNAL MEDICINE

## 2024-04-15 PROCEDURE — 51701 INSERT BLADDER CATHETER: CPT

## 2024-04-15 PROCEDURE — C1898 LEAD, PMKR, OTHER THAN TRANS: HCPCS | Performed by: INTERNAL MEDICINE

## 2024-04-15 PROCEDURE — 99291 CRITICAL CARE FIRST HOUR: CPT | Performed by: INTERNAL MEDICINE

## 2024-04-15 PROCEDURE — 93306 TTE W/DOPPLER COMPLETE: CPT | Performed by: INTERNAL MEDICINE

## 2024-04-15 PROCEDURE — 99223 1ST HOSP IP/OBS HIGH 75: CPT | Performed by: INTERNAL MEDICINE

## 2024-04-15 DEVICE — LEAD 5076-52 MRI US RCMCRD
Type: IMPLANTABLE DEVICE | Status: FUNCTIONAL
Brand: CAPSUREFIX NOVUS MRI™ SURESCAN®

## 2024-04-15 DEVICE — LEAD 5076-45 MRI US RCMCRD
Type: IMPLANTABLE DEVICE | Status: FUNCTIONAL
Brand: CAPSUREFIX NOVUS MRI™ SURESCAN®

## 2024-04-15 DEVICE — IPG W1DR01 AZURE XT DR MRI USA
Type: IMPLANTABLE DEVICE | Status: FUNCTIONAL
Brand: AZURE™ XT DR MRI SURESCAN™

## 2024-04-15 RX ORDER — DONEPEZIL HYDROCHLORIDE 5 MG/1
5 TABLET, FILM COATED ORAL NIGHTLY
Status: DISCONTINUED | OUTPATIENT
Start: 2024-04-15 | End: 2024-04-16 | Stop reason: HOSPADM

## 2024-04-15 RX ORDER — HYDRALAZINE HYDROCHLORIDE 20 MG/ML
10 INJECTION INTRAMUSCULAR; INTRAVENOUS EVERY 4 HOURS PRN
Status: DISCONTINUED | OUTPATIENT
Start: 2024-04-15 | End: 2024-04-16 | Stop reason: HOSPADM

## 2024-04-15 RX ORDER — ONDANSETRON 2 MG/ML
4 INJECTION INTRAMUSCULAR; INTRAVENOUS EVERY 6 HOURS PRN
Status: DISCONTINUED | OUTPATIENT
Start: 2024-04-15 | End: 2024-04-16 | Stop reason: HOSPADM

## 2024-04-15 RX ORDER — SODIUM CHLORIDE 0.9 % (FLUSH) 0.9 %
5-40 SYRINGE (ML) INJECTION PRN
Status: DISCONTINUED | OUTPATIENT
Start: 2024-04-15 | End: 2024-04-15 | Stop reason: HOSPADM

## 2024-04-15 RX ORDER — ASPIRIN 81 MG/1
81 TABLET, CHEWABLE ORAL DAILY
Status: DISCONTINUED | OUTPATIENT
Start: 2024-04-15 | End: 2024-04-16 | Stop reason: HOSPADM

## 2024-04-15 RX ORDER — SODIUM CHLORIDE 9 MG/ML
INJECTION, SOLUTION INTRAVENOUS PRN
Status: DISCONTINUED | OUTPATIENT
Start: 2024-04-15 | End: 2024-04-15 | Stop reason: HOSPADM

## 2024-04-15 RX ORDER — MORPHINE SULFATE 4 MG/ML
2 INJECTION, SOLUTION INTRAMUSCULAR; INTRAVENOUS
Status: DISCONTINUED | OUTPATIENT
Start: 2024-04-15 | End: 2024-04-16 | Stop reason: HOSPADM

## 2024-04-15 RX ORDER — SODIUM CHLORIDE 0.9 % (FLUSH) 0.9 %
5-40 SYRINGE (ML) INJECTION EVERY 12 HOURS SCHEDULED
Status: DISCONTINUED | OUTPATIENT
Start: 2024-04-15 | End: 2024-04-15 | Stop reason: HOSPADM

## 2024-04-15 RX ORDER — DOPAMINE HYDROCHLORIDE 160 MG/100ML
1-20 INJECTION, SOLUTION INTRAVENOUS CONTINUOUS
Status: DISCONTINUED | OUTPATIENT
Start: 2024-04-15 | End: 2024-04-15

## 2024-04-15 RX ORDER — FENTANYL CITRATE 50 UG/ML
INJECTION, SOLUTION INTRAMUSCULAR; INTRAVENOUS PRN
Status: DISCONTINUED | OUTPATIENT
Start: 2024-04-15 | End: 2024-04-15 | Stop reason: HOSPADM

## 2024-04-15 RX ORDER — LIDOCAINE HYDROCHLORIDE 10 MG/ML
INJECTION, SOLUTION INFILTRATION; PERINEURAL PRN
Status: DISCONTINUED | OUTPATIENT
Start: 2024-04-15 | End: 2024-04-15 | Stop reason: HOSPADM

## 2024-04-15 RX ORDER — MIDAZOLAM HYDROCHLORIDE 1 MG/ML
INJECTION INTRAMUSCULAR; INTRAVENOUS PRN
Status: DISCONTINUED | OUTPATIENT
Start: 2024-04-15 | End: 2024-04-15 | Stop reason: HOSPADM

## 2024-04-15 RX ORDER — MORPHINE SULFATE 4 MG/ML
4 INJECTION, SOLUTION INTRAMUSCULAR; INTRAVENOUS
Status: DISCONTINUED | OUTPATIENT
Start: 2024-04-15 | End: 2024-04-16 | Stop reason: HOSPADM

## 2024-04-15 RX ADMIN — Medication 5 MG: at 22:17

## 2024-04-15 RX ADMIN — ACETAMINOPHEN 650 MG: 325 TABLET ORAL at 04:49

## 2024-04-15 RX ADMIN — ONDANSETRON 4 MG: 2 INJECTION INTRAMUSCULAR; INTRAVENOUS at 05:00

## 2024-04-15 RX ADMIN — PERFLUTREN 1.5 ML: 6.52 INJECTION, SUSPENSION INTRAVENOUS at 11:03

## 2024-04-15 RX ADMIN — CEFAZOLIN 1000 MG: 1 INJECTION, POWDER, FOR SOLUTION INTRAMUSCULAR; INTRAVENOUS at 19:45

## 2024-04-15 RX ADMIN — SODIUM CHLORIDE, PRESERVATIVE FREE 10 ML: 5 INJECTION INTRAVENOUS at 11:43

## 2024-04-15 RX ADMIN — CEFAZOLIN 1000 MG: 1 INJECTION, POWDER, FOR SOLUTION INTRAMUSCULAR; INTRAVENOUS at 16:05

## 2024-04-15 RX ADMIN — ACETAMINOPHEN 650 MG: 325 TABLET ORAL at 18:26

## 2024-04-15 RX ADMIN — DONEPEZIL HYDROCHLORIDE 5 MG: 10 TABLET, FILM COATED ORAL at 19:45

## 2024-04-15 RX ADMIN — SODIUM CHLORIDE, PRESERVATIVE FREE 10 ML: 5 INJECTION INTRAVENOUS at 19:46

## 2024-04-15 RX ADMIN — DOPAMINE HYDROCHLORIDE 5 MCG/KG/MIN: 160 INJECTION, SOLUTION INTRAVENOUS at 04:09

## 2024-04-15 ASSESSMENT — ENCOUNTER SYMPTOMS
STRIDOR: 0
BLOOD IN STOOL: 0
SHORTNESS OF BREATH: 0
CHOKING: 0
COLOR CHANGE: 0
BACK PAIN: 0
TROUBLE SWALLOWING: 0
CHEST TIGHTNESS: 0
VOMITING: 0
WHEEZING: 0
GASTROINTESTINAL NEGATIVE: 1
EYES NEGATIVE: 1
RESPIRATORY NEGATIVE: 1
NAUSEA: 0
PHOTOPHOBIA: 0
COUGH: 0

## 2024-04-15 ASSESSMENT — PAIN SCALES - GENERAL
PAINLEVEL_OUTOF10: 0
PAINLEVEL_OUTOF10: 0
PAINLEVEL_OUTOF10: 3
PAINLEVEL_OUTOF10: 2
PAINLEVEL_OUTOF10: 0

## 2024-04-15 ASSESSMENT — PAIN DESCRIPTION - LOCATION
LOCATION: CHEST
LOCATION: HEAD

## 2024-04-15 NOTE — CONSULTS
Subjective:      Patient ID: Dolores Lopez is a 83 y.o. female who presents today for dizziness.    HPI 83 right-handed female admitted dizziness going on for 3-days.  Patient denies any tinnitus or hearing loss.  Patient is mostly lightheaded with clammy feeling.  Patient does have dementia and is English-speaking and therefore some of the history is difficult to certain and obtained from the charts as well and ER notes.  While on the floor patient became very bradycardic patient is having periods of bradycardia.  She is not dizzy while in bed.    Review of Systems   Constitutional:  Negative for fever.   HENT:  Negative for ear pain, tinnitus and trouble swallowing.    Eyes:  Negative for photophobia and visual disturbance.   Respiratory:  Negative for choking and shortness of breath.    Cardiovascular:  Negative for chest pain and palpitations.   Gastrointestinal:  Negative for nausea and vomiting.   Musculoskeletal:  Negative for back pain, gait problem, joint swelling, myalgias, neck pain and neck stiffness.   Skin:  Negative for color change.   Allergic/Immunologic: Negative for food allergies.   Neurological:  Positive for dizziness and light-headedness. Negative for tremors, seizures, syncope, facial asymmetry, speech difficulty, weakness, numbness and headaches.   Psychiatric/Behavioral:  Negative for behavioral problems, confusion, hallucinations and sleep disturbance.        Past Medical History:   Diagnosis Date    Diabetes (HCC)     Dyspnea     Eczema     Fractures     Hyperlipidemia     Hypertension      Past Surgical History:   Procedure Laterality Date    HYSTERECTOMY, TOTAL ABDOMINAL (CERVIX REMOVED)       Social History     Socioeconomic History    Marital status:      Spouse name: Not on file    Number of children: Not on file    Years of education: Not on file    Highest education level: Not on file   Occupational History    Not on file   Tobacco Use    Smoking status: Never     Passive  Date/Time     04/15/2024 05:36 AM    K 4.1 04/15/2024 05:36 AM     04/15/2024 05:36 AM    CO2 22 04/15/2024 05:36 AM    BUN 21 04/15/2024 05:36 AM    CREATININE 0.68 04/15/2024 05:36 AM    LABGLOM 85.8 04/15/2024 05:36 AM    GLUCOSE 177 04/15/2024 05:36 AM    PROT 6.8 04/14/2024 01:15 PM    LABALBU 3.8 04/14/2024 01:15 PM    CALCIUM 9.1 04/15/2024 05:36 AM    BILITOT <0.2 04/14/2024 01:15 PM    ALKPHOS 84 04/14/2024 01:15 PM    AST 28 04/14/2024 01:15 PM    ALT 23 04/14/2024 01:15 PM     No results found for: \"PROTIME\", \"INR\"  No results found for: \"TSH\", \"MTALYDGA71\", \"FOLATE\", \"FERRITIN\", \"IRON\", \"TIBC\", \"PTRFSAT\", \"RETICCOUNT\", \"FREET4\"  Lab Results   Component Value Date/Time    TRIG 170 11/03/2023 10:12 AM    HDL 45 11/03/2023 10:12 AM    LDLCALC 91 11/03/2023 10:12 AM     No results found for: \"LABAMPH\", \"BARBSCNU\", \"LABBENZ\", \"CANNAB\", \"COCAINESCRN\", \"LABMETH\", \"OPIATESCREENURINE\", \"PHENCYCLIDINESCREENURINE\", \"PPXUR\", \"ETOH\"  No results found for: \"LITHIUM\", \"DILFRTOT\", \"VALPROATE\"    Assessment:   Lightheadedness and dizziness consistent with underlying cardiac arrhythmias or bradycardia.  I am not finding anything from a vestibular standpoint I am not able to examine her in detail for the same as we likely should not move her till she has a pacemaker.  Cardiology on consult.  CT head did not show anything significant.  She will require a carotid ultrasound once all this is settled out for vertebral flow studies.    Raymundo Mondragon MD, NATHALIE  Diplomate, American Board of Psychiatry & Neurology  Board Certified in Vascular Neurology  Board Certified in Neuromuscular Medicine  Certified in Neurorehabilitation         Plan:

## 2024-04-15 NOTE — SIGNIFICANT EVENT
Notified by nursing that patient has had some high-grade AV block.  Monitoring room was noted several instances of second-degree type II AV block, as well as now persistent bradycardia at 39-40 bpm.  Patient does not appear to be on any negative chronotropic or inotropic medications here.  In setting of apparent high-grade AV block and persistent bradycardia, we will transfer the patient to the ICU for higher level of care.    Electronically signed by Stephie Pena DO on 4/15/2024 at 2:36 AM      Addendum:  Spoke with Dr. Fox.  He recommended dopamine gtt, and he'll see patient in the morning.  Patient is hypertensive to 170s despite HR in 39-40 range, demonstrating complete 3rd degree heart block at this time.  She is slightly persistently dizzy.  CCB and beta blockers contraindicated, will use hydralazine Q4hrs PRN with parameters a present for direct vasodilation with need for dopamine infusion.    Electronically signed by Stephie Pena DO on 4/15/2024 at 3:55 AM

## 2024-04-15 NOTE — PROGRESS NOTES
[facility] Department  Change in Status Communication Form      To the referring physician:    Due to an acute change in this patient's medical status, new Occupational Therapy Eval and Treatment orders are required. Pt has transferred to ICU for change in acute medical status. Please reorder when pt. is medically stable to resume OOB activity, as appropriate.    We thank you for your referral.     Regards,   Erika Galeano OT Department.     Electronically signed by MINDY Verduzco/L on 4/15/24 at 8:29 AM EDT

## 2024-04-15 NOTE — PROGRESS NOTES
Physical Therapy Missed Treatment   Facility/Department: [facility] MED SURG IC11/IC11-01    NAME: Dolores Lopez    : 1940 (83 y.o.)  MRN: [id number]    Account: [id number]  Gender: female      Attempted PT eval. Nursing requests no PT this date with pt pending PM      Will follow and attempt PT evaluation again at earliest availability.       Rossy Mane, PT, 04/15/24 at 1:19 PM

## 2024-04-15 NOTE — PROGRESS NOTES
03:30-07:30 Shift Summary   Pt arrived to ICU bed 11, all electronic monitoring equipment applied to pt and functioning appropriately. Assessments completed (see flowsheets). Pt A&O to person, place, and situation, disoriented to time. EKG completed and reported to JAUN Shirley. IV medications titrated/infusing per orders (see emar). Skin assessment completed by this nurse and COLLEEN Elder RN. SCDs applied to BLE. Daughter, Breana, remained at bedside; pleasant and cooperative with staff. Labs obtained by . No s/s of distress noted throughout shift. Bedside handoff report given to oncoming RN. Safety measures in place.

## 2024-04-15 NOTE — FLOWSHEET NOTE
0857: Patient assessment completed, patient alert and oriented times 4, able to make needs known,patient Trinidadian peaking, understands English minimally, daughter at bedside to assist. Patient denies pain when asked. Skin intact, new mepilex placed to sacrum for prevention. Turned and repositioned as tolerated by patient. Maintains Dopamine drip continuous. Call light in reach, will continue to monitor throughout this shift. .Electronically signed by Amie Graves RN on 4/15/2024 at 9:01 AM  1143: Patient not on beta-blocker, patient is on ace inhibitor Lisinopril, last time given 4/13/24, Dr Vogel notified. .Electronically signed by Amie Graves RN on 4/15/2024 at 11:43 AM  1152: Patient bathed in Chlorhexidine for upcoming procedure. .Electronically signed by Amie Graves RN on 4/15/2024 at 11:52 AM  1315: Spoke with Cath Lab, report given to RN. .Electronically signed by Amie Graves RN on 4/15/2024 at 1:40 PM  1530: Patient coughing with fluid after removal of NG, speech therapy consult placed. .Electronically signed by Amie Graves RN on 4/15/2024 at 3:31 PM  1800: Patient returned from pacemaker placement/procedure. Bedside report received from Surgical team. Sling in place to right arm, dressing intact, no shadowing noted. Perfect serve sent to Dr Moe in regards to administrations of Dopamine, currently infusing at 5 mq. Patient alert and oriented, NSR. Perfect serve received from Dr Moe OK to stop Dopamine, request order to be placed. Family at bedside. .Electronically signed by Amie Graves RN on 4/15/2024 at 6:15 PM  1830: EKG completed and transmitted. .Electronically signed by Amie Graves RN on 4/15/2024 at 6:49 PM

## 2024-04-15 NOTE — PROGRESS NOTES
PHARMACY NOTE:   ICU Rounds Attended (10-15 minutes in patient room):    Pt diagnosis: Dizziness [R42]  YOVANNY (acute kidney injury) (HCC) [N17.9]  Near syncope [R55]  Symptomatic bradycardia [R00.1]    Follow up/changes:  None    Renal:   Recent Labs     04/15/24  0536   CREATININE 0.68   Estimated Creatinine Clearance: 48 mL/min (based on SCr of 0.68 mg/dL).      DVT Prophylaxis/Anticoagulant Therapy: Lovenox  Recent Labs     04/15/24  0536   HGB 12.1   HCT 37.0        Stress Ulcer Prophylaxis: None  [] Pantoprazole  [] Famotidine    Steroid Therapy: None   [] Solu-medrol    [] Solu-Cortef    [] Prednisone   [] Decadron    Insulin Coverage (goal: 140-180):   LDSSI TID WC and HS  Recent Labs     04/14/24  1315 04/15/24  0536   GLUCOSE 228* 177*     Bowel Regimen:   [x] MiraLAX PRN  [] Colace     Pressors: None  [] Levophed  [] Epinephrine  [] Vasopressin  [] Gilmer-Synephrine  [] midodrine  Sedation: None  [] Precedex  [] Fentanyl  [] Propofol  Drips:  [x] Dopamine    Antimicrobial Therapy: None  Recent Labs     04/15/24  0536   WBC 5.8     Recent Labs     04/14/24  1323   COVID19 Not Detected     Core measures assessed/met      Thank you,    Barbara Langston, PharmD  PGY1 Pharmacy Resident  4/15/2024 10:55 AM

## 2024-04-15 NOTE — PROGRESS NOTES
@ 0145 this RN was notified by the monitor tech that the patient was in an arrhythmia with a low heart. This RN along with other floor staff were at bedside around 0150 to assess the patient. The patient's daughter is at bedside as well staying with the patient overnight. This RN educated the patient and family regarding the telemetry abnormalities and obtained VS. The patient denied any symptoms and stated they \"feel fine\". Dr. Pena and Dr. Vogel were notified of patient's status and given updates. Dr. Pena admitted the patient up to ICU. This RN gave report to the ICU nurse and this RN along with Emerald RN and the patient's daughter transported the patient up to ICU via bed. Patient arrived to their ICU room at 0330.

## 2024-04-15 NOTE — BRIEF OP NOTE
Section of Cardiology  Adult Brief Pacemaker Procedure Note        Procedure(s):  dual chamber PPM placement, MRI compatible.     Pre-operative Diagnosis:  SSS, heart block    H&P Status: Completed and reviewed.     Post-operative Diagnosis:  Successful PPM placement    Findings: see full report    Complications:  none    Primary Proceduralist:   Dr. Mg Moe       Full procedure note to follow

## 2024-04-15 NOTE — CONSULTS
Critical Care Consult        Admit Date: 4/14/2024    PCP:  Jaydon Wright MD        CHIEF COMPLAINT / HPI:                The patient is a 83 y.o. female with significant past medical history of diabetes, hyperlipidemia and hypertension who presented with dizziness.  This was associated with fatigue and shortness of breath.  Additionally, she was having nausea for few days.  Heart rate was found to be in the 30s and 40s.  Started on dopamine.  Patient is not on beta-blocker or calcium channel blocker as an outpatient.  Electrolytes has been normal.         Past Medical History:      Diagnosis Date    Diabetes (HCC)     Dyspnea     Eczema     Fractures     Hyperlipidemia     Hypertension         Past Surgical History:        Procedure Laterality Date    HYSTERECTOMY, TOTAL ABDOMINAL (CERVIX REMOVED)         Current Medications:     aspirin  81 mg Oral Daily    donepezil  5 mg Oral Nightly    sodium chloride flush  5-40 mL IntraVENous 2 times per day    enoxaparin  40 mg SubCUTAneous Daily    melatonin  5 mg Oral Nightly    insulin lispro  0-4 Units SubCUTAneous TID WC    insulin lispro  0-4 Units SubCUTAneous Nightly     Home Meds:  Prior to Admission medications    Medication Sig Start Date End Date Taking? Authorizing Provider   donepezil (ARICEPT) 5 MG tablet Take 1 tablet by mouth nightly   Yes Thai Kay MD   vitamin D (CHOLECALCIFEROL) 25 MCG (1000 UT) TABS tablet Take 1 tablet by mouth daily    Thai Kay MD   LYCOPENE PO Take 1 tablet by mouth daily  Patient not taking: Reported on 4/15/2024    Thai Kay MD   ONETOUCH ULTRA strip TEST 3 X DAILY E11.65 8/28/23   Emil Myers MD   aspirin 81 MG chewable tablet Take by mouth    Thai Kay MD   cyanocobalamin 100 MCG tablet Take 1 tablet by mouth daily    Thai Kay MD   glimepiride (AMARYL) 2 MG tablet Take 1 tablet by mouth daily 5/20/19   Thai Kay MD   lisinopril (PRINIVIL;ZESTRIL) 5 MG  channel blocker.  -Strict intake and output measurement.  Watch urine output closely  -Tight glucose control.  -Tight blood pressure control  -DVT and GI prophylaxis.        Full Code    Excluding procedures, the total critical care time caring for this patient with life threatening, unstable organ failure, including direct patient contact, review of medical record, management of life support systems, review of data including imaging and labs, discussions with other team members, patient's family and physicians at least 32 minutes so far today.     Electronically signed by Taj Acevedo MD on 4/15/2024 at 9:43 AM

## 2024-04-15 NOTE — CARE COORDINATION
Team ICU rounds done this am at bedside and family present. Pt on Dopamine gtt requiring ICU care and possible PM. I messaged Dr. Anderson re: obs status to get inpatient order.

## 2024-04-15 NOTE — PROGRESS NOTES
@ 0203 This charge RN was notified by monitor tech of cardiac rhythm abnormalities and bradycardia.   Sent high priority perfect serve messages to Dr. Pena and NP Dolores Hoover to notify that patient started having abnormal cardiac rhythm @ 0143, with dropping qrs at times, appears to be 2nd degree type II heart block, and HR dropping as low as 39 at times. Prior to this, as seen on review of monitor, patient had been normal sinus rhythm since she arrived this floor. Daughter is in room and translating between nursing staff and patient. Patient denies symptoms. Patient's primary RN is in room and aware of this.     12 leak EKGs performed, and monitor tech uploaded strips to epic.    Also sent perfect serve message to Dr. Vogel to notify of this.     Suzie VALADEZN, RN, [facility]  4/15/2024 3:17 AM

## 2024-04-15 NOTE — PLAN OF CARE
Patient progressing towards discharge    Problem: Discharge Planning  Goal: Discharge to home or other facility with appropriate resources  4/15/2024 0853 by Amie Graves RN  Outcome: Progressing  Flowsheets (Taken 4/15/2024 0800)  Discharge to home or other facility with appropriate resources: Identify barriers to discharge with patient and caregiver  4/15/2024 0425 by Derek Irving RN  Outcome: Progressing     Problem: Safety - Adult  Goal: Free from fall injury  4/15/2024 0853 by Amie Graves RN  Outcome: Progressing  4/15/2024 0425 by Derek Irving RN  Outcome: Progressing     Problem: Chronic Conditions and Co-morbidities  Goal: Patient's chronic conditions and co-morbidity symptoms are monitored and maintained or improved  4/15/2024 0853 by Amie Graves RN  Outcome: Progressing  Flowsheets (Taken 4/15/2024 0800)  Care Plan - Patient's Chronic Conditions and Co-Morbidity Symptoms are Monitored and Maintained or Improved: Monitor and assess patient's chronic conditions and comorbid symptoms for stability, deterioration, or improvement  4/15/2024 0425 by Derek Irving RN  Outcome: Progressing

## 2024-04-15 NOTE — PROGRESS NOTES
[facility] Hospitalist Progress Note    Admitting Date and Time: 4/14/2024  1:07 PM  Admit Dx: Dizziness [R42]  YOVANNY (acute kidney injury) (HCC) [N17.9]  Near syncope [R55]  Symptomatic bradycardia [R00.1]  Confusion [R41.0]    Subjective:    Overnight patient bradycardic necessitating ICU transfer. Patient this morning resting comfortably in bed without complaints. She remains bradycardic    ROS: denies fever, chills, cp, sob, n/v, HA unless stated above.      aspirin  81 mg Oral Daily    donepezil  5 mg Oral Nightly    sodium chloride flush  5-40 mL IntraVENous 2 times per day    sodium chloride flush  5-40 mL IntraVENous 2 times per day    enoxaparin  40 mg SubCUTAneous Daily    melatonin  5 mg Oral Nightly    insulin lispro  0-4 Units SubCUTAneous TID WC    insulin lispro  0-4 Units SubCUTAneous Nightly     hydrALAZINE, 10 mg, Q4H PRN  sodium chloride flush, 5-40 mL, PRN  sodium chloride, , PRN  sodium chloride flush, 5-40 mL, PRN  sodium chloride, , PRN  potassium chloride, 40 mEq, PRN   Or  potassium alternative oral replacement, 40 mEq, PRN   Or  potassium chloride, 10 mEq, PRN  magnesium sulfate, 2,000 mg, PRN  ondansetron, 4 mg, Q8H PRN   Or  ondansetron, 4 mg, Q6H PRN  polyethylene glycol, 17 g, Daily PRN  acetaminophen, 650 mg, Q6H PRN   Or  acetaminophen, 650 mg, Q6H PRN  glucose, 4 tablet, PRN  dextrose bolus, 125 mL, PRN   Or  dextrose bolus, 250 mL, PRN  glucagon (rDNA), 1 mg, PRN  dextrose, , Continuous PRN         Objective:    BP (!) 140/22   Pulse (!) 39   Temp 98 °F (36.7 °C) (Oral)   Resp 17   Ht 1.499 m (4' 11.02\")   Wt 56.7 kg (125 lb)   SpO2 99%   BMI 25.23 kg/m²     General Appearance: alert and oriented to person, place and time and in no acute distress  Skin: warm and dry  Head: normocephalic and atraumatic  Eyes: pupils equal, round, and reactive to light, extraocular eye movements intact, conjunctivae normal  Neck: neck supple and non tender without mass   Pulmonary/Chest:

## 2024-04-15 NOTE — CONSULTS
Inpatient consult to Cardiology  Consult performed by: Jorge Luis Vogel MD  Consult ordered by: Stephie Pena DO          Patient Name: Dolores Lopez  Admit Date: 2024  1:07 PM  MR #: [id number]  : 1940    Attending Physician: Trey Anderson MD  Reason for consult: Bradycardia    History of Presenting Illness:      Dolores Lopez is a 83 y.o. female on hospital day 0 with a history of .   History Obtained From:  patient, electronic medical record    Daughter in room to translate. Pt speak little English.  For 1 week or more she has been tired no energy dizzy and SOB.  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 She has had no cp.    She does have CAD from a prior Cath w Dr. MINOR but was told it was only mild per daughter.     Stat Echo done EF normal   History:      EKG:  Past Medical History:   Diagnosis Date    Diabetes (HCC)     Dyspnea     Eczema     Fractures     Hyperlipidemia     Hypertension      Past Surgical History:   Procedure Laterality Date    HYSTERECTOMY, TOTAL ABDOMINAL (CERVIX REMOVED)         Family History  Family History   Problem Relation Age of Onset    Diabetes Mother     Hypertension Mother     Hypertension Father     Diabetes Father     Hypertension Sister     Diabetes Sister     Hypertension Brother     Diabetes Brother      [] Unable to obtain due to ventilated and/ or neurologic status    Social History     Socioeconomic History    Marital status:      Spouse name: Not on file    Number of children: Not on file    Years of education: Not on file    Highest education level: Not on file   Occupational History    Not on file   Tobacco Use    Smoking status: Never     Passive exposure: Never    Smokeless tobacco: Never   Vaping Use    Vaping Use: Never used   Substance and Sexual Activity    Alcohol use: Not Currently     Comment: occasional    Drug use: Never    Sexual activity: Not Currently     Partners: Male

## 2024-04-16 VITALS
OXYGEN SATURATION: 98 % | SYSTOLIC BLOOD PRESSURE: 128 MMHG | HEART RATE: 59 BPM | HEIGHT: 59 IN | BODY MASS INDEX: 25.2 KG/M2 | RESPIRATION RATE: 18 BRPM | WEIGHT: 125 LBS | TEMPERATURE: 98.6 F | DIASTOLIC BLOOD PRESSURE: 49 MMHG

## 2024-04-16 LAB
ANION GAP SERPL CALCULATED.3IONS-SCNC: 9 MEQ/L (ref 9–15)
BASOPHILS # BLD: 0 K/UL (ref 0–0.2)
BASOPHILS NFR BLD: 0.3 %
BUN SERPL-MCNC: 18 MG/DL (ref 8–23)
CALCIUM SERPL-MCNC: 8.8 MG/DL (ref 8.5–9.9)
CHLORIDE SERPL-SCNC: 106 MEQ/L (ref 95–107)
CO2 SERPL-SCNC: 25 MEQ/L (ref 20–31)
CREAT SERPL-MCNC: 0.66 MG/DL (ref 0.5–0.9)
EKG ATRIAL RATE: 71 BPM
EKG ATRIAL RATE: 79 BPM
EKG P AXIS: 39 DEGREES
EKG P AXIS: 44 DEGREES
EKG P-R INTERVAL: 172 MS
EKG Q-T INTERVAL: 470 MS
EKG Q-T INTERVAL: 540 MS
EKG QRS DURATION: 110 MS
EKG QRS DURATION: 138 MS
EKG QTC CALCULATION (BAZETT): 445 MS
EKG QTC CALCULATION (BAZETT): 510 MS
EKG R AXIS: -57 DEGREES
EKG R AXIS: 33 DEGREES
EKG T AXIS: 49 DEGREES
EKG T AXIS: 62 DEGREES
EKG VENTRICULAR RATE: 41 BPM
EKG VENTRICULAR RATE: 71 BPM
EOSINOPHIL # BLD: 0.1 K/UL (ref 0–0.7)
EOSINOPHIL NFR BLD: 1.6 %
ERYTHROCYTE [DISTWIDTH] IN BLOOD BY AUTOMATED COUNT: 14.3 % (ref 11.5–14.5)
GLUCOSE SERPL-MCNC: 111 MG/DL (ref 70–99)
HCT VFR BLD AUTO: 33.5 % (ref 37–47)
HGB BLD-MCNC: 10.9 G/DL (ref 12–16)
LYMPHOCYTES # BLD: 1.2 K/UL (ref 1–4.8)
LYMPHOCYTES NFR BLD: 17.3 %
MCH RBC QN AUTO: 30.7 PG (ref 27–31.3)
MCHC RBC AUTO-ENTMCNC: 32.5 % (ref 33–37)
MCV RBC AUTO: 94.4 FL (ref 79.4–94.8)
MONOCYTES # BLD: 0.5 K/UL (ref 0.2–0.8)
MONOCYTES NFR BLD: 6.8 %
NEUTROPHILS # BLD: 5 K/UL (ref 1.4–6.5)
NEUTS SEG NFR BLD: 73.7 %
PLATELET # BLD AUTO: 164 K/UL (ref 130–400)
POTASSIUM SERPL-SCNC: 4.9 MEQ/L (ref 3.4–4.9)
RBC # BLD AUTO: 3.55 M/UL (ref 4.2–5.4)
SODIUM SERPL-SCNC: 140 MEQ/L (ref 135–144)
WBC # BLD AUTO: 6.8 K/UL (ref 4.8–10.8)

## 2024-04-16 PROCEDURE — 93286 PERI-PX EVAL PM/LDLS PM IP: CPT

## 2024-04-16 PROCEDURE — 2580000003 HC RX 258: Performed by: INTERNAL MEDICINE

## 2024-04-16 PROCEDURE — 36415 COLL VENOUS BLD VENIPUNCTURE: CPT

## 2024-04-16 PROCEDURE — 6360000002 HC RX W HCPCS: Performed by: INTERNAL MEDICINE

## 2024-04-16 PROCEDURE — 99232 SBSQ HOSP IP/OBS MODERATE 35: CPT | Performed by: PSYCHIATRY & NEUROLOGY

## 2024-04-16 PROCEDURE — 6370000000 HC RX 637 (ALT 250 FOR IP): Performed by: INTERNAL MEDICINE

## 2024-04-16 PROCEDURE — 97166 OT EVAL MOD COMPLEX 45 MIN: CPT

## 2024-04-16 PROCEDURE — 2700000000 HC OXYGEN THERAPY PER DAY

## 2024-04-16 PROCEDURE — 93005 ELECTROCARDIOGRAM TRACING: CPT | Performed by: STUDENT IN AN ORGANIZED HEALTH CARE EDUCATION/TRAINING PROGRAM

## 2024-04-16 PROCEDURE — 51798 US URINE CAPACITY MEASURE: CPT

## 2024-04-16 PROCEDURE — 85025 COMPLETE CBC W/AUTO DIFF WBC: CPT

## 2024-04-16 PROCEDURE — 6370000000 HC RX 637 (ALT 250 FOR IP): Performed by: STUDENT IN AN ORGANIZED HEALTH CARE EDUCATION/TRAINING PROGRAM

## 2024-04-16 PROCEDURE — 99232 SBSQ HOSP IP/OBS MODERATE 35: CPT | Performed by: INTERNAL MEDICINE

## 2024-04-16 PROCEDURE — 80048 BASIC METABOLIC PNL TOTAL CA: CPT

## 2024-04-16 PROCEDURE — APPSS15 APP SPLIT SHARED TIME 0-15 MINUTES: Performed by: NURSE PRACTITIONER

## 2024-04-16 PROCEDURE — 93010 ELECTROCARDIOGRAM REPORT: CPT | Performed by: INTERNAL MEDICINE

## 2024-04-16 PROCEDURE — 99233 SBSQ HOSP IP/OBS HIGH 50: CPT | Performed by: INTERNAL MEDICINE

## 2024-04-16 PROCEDURE — 94150 VITAL CAPACITY TEST: CPT

## 2024-04-16 PROCEDURE — 97162 PT EVAL MOD COMPLEX 30 MIN: CPT

## 2024-04-16 RX ORDER — ATORVASTATIN CALCIUM 40 MG/1
40 TABLET, FILM COATED ORAL NIGHTLY
Qty: 30 TABLET | Refills: 3 | Status: SHIPPED | OUTPATIENT
Start: 2024-04-16

## 2024-04-16 RX ORDER — METOPROLOL SUCCINATE 50 MG/1
50 TABLET, EXTENDED RELEASE ORAL DAILY
Qty: 30 TABLET | Refills: 3 | Status: SHIPPED | OUTPATIENT
Start: 2024-04-17

## 2024-04-16 RX ORDER — METOPROLOL SUCCINATE 50 MG/1
50 TABLET, EXTENDED RELEASE ORAL DAILY
Status: DISCONTINUED | OUTPATIENT
Start: 2024-04-16 | End: 2024-04-16 | Stop reason: HOSPADM

## 2024-04-16 RX ORDER — ACETAMINOPHEN 500 MG
1000 TABLET ORAL 3 TIMES DAILY PRN
Qty: 42 TABLET | Refills: 0 | Status: SHIPPED | OUTPATIENT
Start: 2024-04-16

## 2024-04-16 RX ORDER — MECOBALAMIN 5000 MCG
5 TABLET,DISINTEGRATING ORAL NIGHTLY
Qty: 30 TABLET | Refills: 1 | Status: SHIPPED | OUTPATIENT
Start: 2024-04-16

## 2024-04-16 RX ORDER — ATORVASTATIN CALCIUM 40 MG/1
40 TABLET, FILM COATED ORAL NIGHTLY
Status: DISCONTINUED | OUTPATIENT
Start: 2024-04-16 | End: 2024-04-16 | Stop reason: HOSPADM

## 2024-04-16 RX ADMIN — ACETAMINOPHEN 650 MG: 325 TABLET ORAL at 10:32

## 2024-04-16 RX ADMIN — ASPIRIN 81 MG 81 MG: 81 TABLET ORAL at 10:11

## 2024-04-16 RX ADMIN — METOPROLOL SUCCINATE 50 MG: 50 TABLET, EXTENDED RELEASE ORAL at 10:11

## 2024-04-16 RX ADMIN — CEFAZOLIN 1000 MG: 1 INJECTION, POWDER, FOR SOLUTION INTRAMUSCULAR; INTRAVENOUS at 00:49

## 2024-04-16 ASSESSMENT — ENCOUNTER SYMPTOMS
VOMITING: 0
SHORTNESS OF BREATH: 0
BLOOD IN STOOL: 0
NAUSEA: 0
CHEST TIGHTNESS: 0
TROUBLE SWALLOWING: 0
RESPIRATORY NEGATIVE: 1
STRIDOR: 0
COUGH: 0
GASTROINTESTINAL NEGATIVE: 1
EYES NEGATIVE: 1
COLOR CHANGE: 0
WHEEZING: 0

## 2024-04-16 ASSESSMENT — PAIN DESCRIPTION - ORIENTATION: ORIENTATION: LEFT

## 2024-04-16 ASSESSMENT — PAIN DESCRIPTION - LOCATION: LOCATION: SHOULDER

## 2024-04-16 ASSESSMENT — PAIN SCALES - GENERAL: PAINLEVEL_OUTOF10: 7

## 2024-04-16 NOTE — PROGRESS NOTES
includes washing, rinsing, drying)?: A Lot  How much help is needed for toileting (which includes using toilet, bedpan, or urinal)?: A Little  How much help is needed for putting on and taking off regular upper body clothing?: A Little  How much help is needed for taking care of personal grooming?: A Little  How much help for eating meals?: A Little  AM-[facility] Inpatient Daily Activity Raw Score: 16  AM-PAC Inpatient ADL T-Scale Score : 35.96  ADL Inpatient CMS 0-100% Score: 53.32    Therapy key for assistance levels -   Independent/Mod I = Pt. is able to perform task with no assistance but may require a device   Stand by assistance = Pt. does not perform task at an independent level but does not need physical assistance, requires verbal cues  Minimal, Moderate, Maximal Assistance = Pt. requires physical assistance (25%, 50%, 75% assist from helper) for task but is able to actively participate in task   Dependent = Pt. requires total assistance with task and is not able to actively participate with task completion     Plan:  Occupational Therapy Plan  Times Per Week: 1-4x  Therapy Duration:  (Length of acute stay)  Current Treatment Recommendations: Strengthening, Balance training, Functional mobility training, Endurance training, Safety education & training, Pain management, Patient/Caregiver education & training, Equipment evaluation, education, & procurement, Home management training, Self-Care / ADL    Goals:   Patient will:    - Improve functional endurance to tolerate/complete 30 mins of ADL's  - Be Mod I in UB ADLs   - Be Mod I  in LB ADLs  - Be Mod I in ADL transfers without LOB  - Be Mod I in toileting tasks  - Improve R UE strength and endurance to 4/5 in order to participate in self-care activities as projected.  - Access appropriate D/C site with as few architectural barriers as possible.  - Sequence self-care tasks with no verbal cues for safety    Patient Goal: Patient goals : \"I want to get home\"      Discussed and agreed upon: Yes Comments:       Therapy Time:   Individual   Time In 1052   Time Out 1108   Minutes 16     Eval: 16 minutes     Electronically signed by:    MINDY Verduzco/L,   4/16/2024, 12:48 PM

## 2024-04-16 NOTE — PROGRESS NOTES
Received pt from ICU at this time. Pt is alert and oriented x4. Pt is able to answer questions and follow commands when asked. Pt denies pain at this time. Pt is breathing regular and unlabored on 2L nasal canula. Head of bed is elevated. Left arm is in sling and pt was instructed not to raise arm. Pt is agreeable and understanding to this. Son is at bedside to assist with Occitan interpretation. Tele monitor is on functioning showing SR. SCD's on bilateral legs and functioning. Safety measures in place. Call light with in reach. Bed alarm on and functioning. No further concerns or requests made at this time.

## 2024-04-16 NOTE — PROGRESS NOTES
* Post-op day 1 device check per order Dr. Vogel. Normal Device Function  * Alerts or events: None  * RA impedance 665 ohms  * RA sensing 1.0 mV  * RA capture threshold 0.5V@0.4ms  * RV impedance 513 ohms  * RV sensing: no intrinsic at VVI 40 ppm  * RV capture threshold 0.5V@0.4ms.  * Presenting rhythm AS- with  100.0% since implant.  * Reviewed remote and in-clinic follow up with patient and son.

## 2024-04-16 NOTE — PROGRESS NOTES
Pt has not urinated during shift at this time. Bladder scan showed 400 ml. Pt was able to urinate 100 ml after bladder scan was completed.

## 2024-04-16 NOTE — FLOWSHEET NOTE
Nurse in for am med pass and assessment.  Pt is stating she has been having trouble voiding since last bladder scan/strt cath.  Nurse scanned pt for approx 350 ml of urine.  Pt wanted to try and urinate first.  Nurse assisted the pt to the br where she voided approx 300ml.  Pt then assisted back to bed and nurse rescanned pt again and the reading was 0.  Pt and family instructed to call staff when she needed to void again.    1440  Dr Anderson messaged with above info.

## 2024-04-16 NOTE — PLAN OF CARE
See OT evaluation for all goals and OT POC. Electronically signed by Ivonne Glez OTR/L on 4/16/2024 at 12:50 PM

## 2024-04-16 NOTE — PROGRESS NOTES
[facility] Hospitalist Progress Note    Admitting Date and Time: 4/14/2024  1:07 PM  Admit Dx: Dizziness [R42]  YOVANNY (acute kidney injury) (HCC) [N17.9]  Near syncope [R55]  Symptomatic bradycardia [R00.1]  Confusion [R41.0]    Subjective:    No acute events overnight. Patient feels well this morning, denying any chest pain or dizziness. RN noted concerns regarding urinary retention requiring straight cath. Patient confirms she does feel the urge to urinate    ROS: denies fever, chills, cp, sob, n/v, HA unless stated above.      metoprolol succinate  50 mg Oral Daily    atorvastatin  40 mg Oral Nightly    aspirin  81 mg Oral Daily    donepezil  5 mg Oral Nightly    sodium chloride flush  5-40 mL IntraVENous 2 times per day    [Held by provider] enoxaparin  40 mg SubCUTAneous Daily    melatonin  5 mg Oral Nightly    insulin lispro  0-4 Units SubCUTAneous TID WC    insulin lispro  0-4 Units SubCUTAneous Nightly     hydrALAZINE, 10 mg, Q4H PRN  ondansetron, 4 mg, Q6H PRN  morphine, 2 mg, Q2H PRN   Or  morphine, 4 mg, Q2H PRN  sodium chloride flush, 5-40 mL, PRN  sodium chloride, , PRN  potassium chloride, 40 mEq, PRN   Or  potassium alternative oral replacement, 40 mEq, PRN   Or  potassium chloride, 10 mEq, PRN  magnesium sulfate, 2,000 mg, PRN  ondansetron, 4 mg, Q8H PRN   Or  ondansetron, 4 mg, Q6H PRN  polyethylene glycol, 17 g, Daily PRN  acetaminophen, 650 mg, Q6H PRN   Or  acetaminophen, 650 mg, Q6H PRN  glucose, 4 tablet, PRN  dextrose bolus, 125 mL, PRN   Or  dextrose bolus, 250 mL, PRN  glucagon (rDNA), 1 mg, PRN  dextrose, , Continuous PRN         Objective:    BP (!) 130/51   Pulse 74   Temp 98.3 °F (36.8 °C) (Oral)   Resp 18   Ht 1.499 m (4' 11.02\")   Wt 56.7 kg (125 lb)   SpO2 97%   BMI 25.23 kg/m²     General Appearance: alert and oriented to person, place and time and in no acute distress  Skin: warm and dry  Head: normocephalic and atraumatic  Eyes: pupils equal, round, and reactive to

## 2024-04-16 NOTE — PROGRESS NOTES
[facility] Neurology Daily Progress Note  Name: Dolores Lopez  Age: 83 y.o.  Gender: female  CodeStatus: Full Code  Allergies: Asa [Aspirin]  Iodinated Contrast Media  Lovastatin  Meloxicam  Naproxen  Pravastatin  Shellfish-Derived Products    Chief Complaint:Dizziness (Pt arrived via ems, c/o dizziness and weakness. Denies any pain. Heart rate decreases intermittently per ems, 2nd degree heart block)    Primary Care Provider: Jaydon Wright MD  InpatientTreatment Team: Treatment Team: Attending Provider: Trey Anderson MD; Consulting Physician: Jorge Luis Vogel MD; Consulting Physician: Raymundo Mondragon MD; Utilization Reviewer: Cyn Leal RN; : Cathie Almeida RN; Respiratory Therapist (Day): Mckenzie Becerril RCP; Registered Nurse: Maria Elena Matthew RN  Admission Date: 4/14/2024      HPI   Pt seen and examined for neuro follow up.  Patient is alert and oriented, no acute distress, cooperative.  Family at bedside.  No further vertigo or lightheadedness.  Nonfocal.  Blood pressure stable.  Heart rate stable.  Afebrile.  Pacemaker placed yesterday.  Left arm in sling.  Patient seen and examined no further dizziness or lightheadedness.    Vitals:    04/16/24 1046   BP: (!) 130/51   Pulse: 74   Resp:    Temp: 98.3 °F (36.8 °C)   SpO2: 97%        Review of Systems   Constitutional:  Negative for fatigue and fever.   HENT:  Negative for hearing loss, tinnitus and trouble swallowing.    Eyes:  Negative for visual disturbance.   Respiratory:  Negative for cough, chest tightness, shortness of breath and wheezing.    Cardiovascular:  Negative for chest pain and palpitations.   Gastrointestinal:  Negative for nausea and vomiting.   Skin:  Negative for color change and rash.   Neurological:  Negative for dizziness, tremors, seizures, syncope, facial asymmetry, speech difficulty, weakness, light-headedness, numbness and headaches.   Psychiatric/Behavioral:  Negative for agitation, confusion and hallucinations.  acute infarct.  There is  no evidence of hydrocephalus. Periventricular white matter changes consistent  chronic microvascular disease.    ORBITS: The visualized portion of the orbits demonstrate no acute abnormality.    SINUSES: The visualized paranasal sinuses and mastoid air cells demonstrate  no acute abnormality.    SOFT TISSUES/SKULL:  No acute abnormality of the visualized skull or soft  tissues.    Impression  No acute intracranial abnormality.  No results found for this or any previous visit.  No results found for this or any previous visit.      Carotid duplex: No results found for this or any previous visit.  No results found for this or any previous visit.  No results found for this or any previous visit.      Echo No results found for this or any previous visit.            Assessment/Plan:  4/15/24:  Lightheadedness and dizziness consistent with underlying cardiac arrhythmias or bradycardia. I am not finding anything from a vestibular standpoint I am not able to examine her in detail for the same as we likely should not move her till she has a pacemaker. Cardiology on consult. CT head did not show anything significant. She will require a carotid ultrasound once all this is settled out for vertebral flow studies.     4/16/2024:  Lightheadedness and vertigo in the setting of bradycardia with complete heart block.  Patient is now status post pacemaker placement on 4/15/2024.  No focal deficits are appreciated.  No vestibular concerns.  Okay to DC from neurology standpoint  Neurology to sign off.  Call with questions or concerns.    I have personally performed a face to face diagnostic evaluation on this patient, reviewed all data and investigations, and am the sole provider of all clinical decisions on the neurological status of this patient.  Normal neurological examination patient is arm is in a sling on the left.  She had a pacemaker.  Findings most consistent with hemodynamic changes causing the

## 2024-04-16 NOTE — PROGRESS NOTES
PROGRESS NOTE    Patient Name: Dolores Lopez  Admit Date: 2024  1:07 PM  MR #: [id number]  : 1940    Attending Physician: Trey Anderson MD  Reason for consult: Bradycardia    History of Presenting Illness:      Dolores Lopez is a 83 y.o. female on hospital day 1 with a history of .   History Obtained From:  patient, electronic medical record    Daughter in room to translate. Pt speak little English.  For 1 week or more she has been tired no energy dizzy and SOB.  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 She has had no cp.    She does have CAD from a prior Cath w Dr. MINOR but was told it was only mild per daughter.     Stat Echo done EF normal     24 Tele V Paced 75 w underlying SR.  Son in room.  Pt does speak moderate English. She feels much better.   History:      EKG:  Past Medical History:   Diagnosis Date    Diabetes (HCC)     Dyspnea     Eczema     Fractures     Hyperlipidemia     Hypertension      Past Surgical History:   Procedure Laterality Date    HYSTERECTOMY, TOTAL ABDOMINAL (CERVIX REMOVED)         Family History  Family History   Problem Relation Age of Onset    Diabetes Mother     Hypertension Mother     Hypertension Father     Diabetes Father     Hypertension Sister     Diabetes Sister     Hypertension Brother     Diabetes Brother      [] Unable to obtain due to ventilated and/ or neurologic status    Social History     Socioeconomic History    Marital status:      Spouse name: Not on file    Number of children: Not on file    Years of education: Not on file    Highest education level: Not on file   Occupational History    Not on file   Tobacco Use    Smoking status: Never     Passive exposure: Never    Smokeless tobacco: Never   Vaping Use    Vaping Use: Never used   Substance and Sexual Activity    Alcohol use: Not Currently     Comment: occasional    Drug use: Never    Sexual activity: Not Currently      structures are without acute process.     No acute process.       Active Hospital Problems    Diagnosis Date Noted    Confusion [R41.0] 04/15/2024     Priority: Low    Dizziness [R42] 04/14/2024     Priority: Low         Impression/Plan:   Symptomatic High grade AVB with 2:1 AVB and CHB noted on Tele.  She is on Dopa as well. SHe has not been on AVN suppressing agents. S/p Dual Chamber Pacer.  AM Interrogation- P.   LVEF normal by Echo this am. EF 60-65%.    Severe MS noted. We will evaluate this in near future as out pt. Discussed with pt and son. I am surprised she has not demonstrated AF.  Will add BB. She will f/u w [facility].   CAD - nonobstructive and no angina. - ASA will add BB post PPM need Statin.  If AM interrogation stable and she is able to ambulate she can be discharged to home today and f/u w me 1 week for wound check.      ADDENDUM:   AM pacer Interrogation is WNL.      Thank you for allowing us to participate in the care of this patient.     Will continue to follow.    Please call if questions or concerns arise.    Electronically signed by TARA CHAU MD on 4/16/2024 at 6:18 AM

## 2024-04-16 NOTE — PLAN OF CARE
Problem: Discharge Planning  Goal: Discharge to home or other facility with appropriate resources  4/15/2024 2137 by Rossy Webster RN  Outcome: Progressing  Flowsheets (Taken 4/15/2024 1600 by Amie Graves RN)  Discharge to home or other facility with appropriate resources: Identify barriers to discharge with patient and caregiver  4/15/2024 0853 by Amie Graves RN  Outcome: Progressing  Flowsheets (Taken 4/15/2024 0800)  Discharge to home or other facility with appropriate resources: Identify barriers to discharge with patient and caregiver     Problem: Safety - Adult  Goal: Free from fall injury  4/15/2024 2137 by Rossy Webster RN  Outcome: Progressing  4/15/2024 0853 by Amie Graves RN  Outcome: Progressing     Problem: Chronic Conditions and Co-morbidities  Goal: Patient's chronic conditions and co-morbidity symptoms are monitored and maintained or improved  4/15/2024 2137 by Rossy Webster RN  Outcome: Progressing  Flowsheets (Taken 4/15/2024 1600 by Amie Graves RN)  Care Plan - Patient's Chronic Conditions and Co-Morbidity Symptoms are Monitored and Maintained or Improved: Monitor and assess patient's chronic conditions and comorbid symptoms for stability, deterioration, or improvement  4/15/2024 0853 by Amie Graves RN  Outcome: Progressing  Flowsheets (Taken 4/15/2024 0800)  Care Plan - Patient's Chronic Conditions and Co-Morbidity Symptoms are Monitored and Maintained or Improved: Monitor and assess patient's chronic conditions and comorbid symptoms for stability, deterioration, or improvement     Problem: Neurosensory - Adult  Goal: Achieves stable or improved neurological status  Outcome: Progressing  Flowsheets (Taken 4/15/2024 1800 by Amie Graves RN)  Achieves stable or improved neurological status: Assess for and report changes in neurological status     Problem: Respiratory - Adult  Goal: Achieves optimal ventilation and oxygenation  Outcome: Progressing     Problem: Cardiovascular -

## 2024-04-16 NOTE — PROGRESS NOTES
Physical Therapy Med Surg Initial Assessment  Facility/Department: [facility] [place], [place] [facility] TELEMETRY  Room: [place] [address]       NAME: Dolores Lopez  : 1940 (83 y.o.)  MRN: [id number]  CODE STATUS: Full Code    Date of Service: 2024    Patient Diagnosis(es): Dizziness [R42]  YOVANNY (acute kidney injury) (HCC) [N17.9]  Near syncope [R55]  Symptomatic bradycardia [R00.1]  Confusion [R41.0]   Chief Complaint   Patient presents with    Dizziness     Pt arrived via ems, c/o dizziness and weakness. Denies any pain. Heart rate decreases intermittently per ems, 2nd degree heart block     Patient Active Problem List    Diagnosis Date Noted    Confusion 04/15/2024    Dizziness 2024    Lumbosacral spondylosis without myelopathy 10/01/2019    Heel spur, left 10/01/2019        Past Medical History:   Diagnosis Date    Diabetes (HCC)     Dyspnea     Eczema     Fractures     Hyperlipidemia     Hypertension      Past Surgical History:   Procedure Laterality Date    HYSTERECTOMY, TOTAL ABDOMINAL (CERVIX REMOVED)       Chart Reviewed: Yes  Patient assessed for rehabilitation services?: Yes  Family / Caregiver Present: No  General Comment  Comments: Arianne moreira interpretter at bedside to interpret  Restrictions:  Restrictions/Precautions: Fall Risk, Up as Tolerated     SUBJECTIVE:    Pt denies pain.    Pain  Pre treatment screening: denies  Post treatment screening: denies  Prior Level of Function:  Social/Functional History  Active : No    OBJECTIVE:   Vision  Vision: Within Functional Limits  Hearing: Within functional limits    Cognition:  Overall Orientation Status: Within Functional Limits  Follows Commands: Within Functional Limits    Observation/Palpation  Posture: Fair (forward head, rounded shoulders, increase T kyphosis)  Observation: alert, cooperative, no acute distress noted, sling donned and adjusted to L UE s/p pacemake placement    ROM:  AROM: Generally decreased, functional  PROM: Generally decreased,

## 2024-04-16 NOTE — PROGRESS NOTES
INPATIENT PROGRESS NOTES    PATIENT NAME: Dolores Lopez  MRN: [id number]  SERVICE DATE:  April 16, 2024   SERVICE TIME:  8:10 AM      PRIMARY SERVICE: Pulmonary Disease    CHIEF COMPLAINTS: Accelerated hypertension    INTERVAL HPI: Patient seen and examined at bedside, Interval Notes, orders reviewed. Nursing notes noted    Patient report no shortness of breath, no chest pain, no dizziness, she is on 2 L O2 saturation 99%, no fever, no coughing.  No worsening lower extremity edema.    New information updated in the note today, rest of the examination did not change compared to yesterday.    Review of system:     GI Abdominal pain No  Skin Rash No    Social History     Tobacco Use    Smoking status: Never     Passive exposure: Never    Smokeless tobacco: Never   Substance Use Topics    Alcohol use: Not Currently     Comment: occasional         Problem Relation Age of Onset    Diabetes Mother     Hypertension Mother     Hypertension Father     Diabetes Father     Hypertension Sister     Diabetes Sister     Hypertension Brother     Diabetes Brother          OBJECTIVE    Body mass index is 25.23 kg/m².    PHYSICAL EXAM:  Vitals:  BP (!) 151/60   Pulse 78   Temp 98.1 °F (36.7 °C) (Oral)   Resp 18   Ht 1.499 m (4' 11.02\")   Wt 56.7 kg (125 lb)   SpO2 100%   BMI 25.23 kg/m²     General: alert, cooperative, no distress  Head: normocephalic, atraumatic  Eyes:No gross abnormalities.  ENT:  MMM no lesions  Neck:  supple and no masses  Chest : clear to auscultation bilaterally- no wheezes, rales or rhonchi, normal air movement, no respiratory distress  Heart:: Heart sounds are normal.  Regular rate and rhythm without murmur, gallop or rub.  ABD:  symmetric, soft, non-tender  Musculoskeletal : no cyanosis, no clubbing, and no edema  Neuro:  Grossly normal  Skin: No rashes or nodules noted.  Lymph node:  no cervical nodes  Urology: No Cronin   Psychiatric: appropriate    DATA:   Recent Labs     04/15/24  1527

## 2024-04-17 DIAGNOSIS — Z95.0 PACEMAKER: Primary | ICD-10-CM

## 2024-04-17 NOTE — DISCHARGE SUMMARY
tablet  Commonly known as: TYLENOL  Take 2 tablets by mouth 3 times daily as needed for Pain     atorvastatin 40 MG tablet  Commonly known as: LIPITOR  Take 1 tablet by mouth nightly     melatonin 5 MG Tbdp disintegrating tablet  Take 1 tablet by mouth nightly     metoprolol succinate 50 MG extended release tablet  Commonly known as: TOPROL XL  Take 1 tablet by mouth daily            CONTINUE taking these medications      aspirin 81 MG chewable tablet     blood glucose monitor kit and supplies  Dispense one meter that insurance will cover.     cyanocobalamin 100 MCG tablet     donepezil 5 MG tablet  Commonly known as: ARICEPT     glimepiride 2 MG tablet  Commonly known as: AMARYL     Lancets Misc  1 each by Does not apply route daily     lisinopril 5 MG tablet  Commonly known as: PRINIVIL;ZESTRIL     OneTouch Ultra strip  Generic drug: blood glucose test strips  TEST 3 X DAILY E11.65     pioglitazone 30 MG tablet  Commonly known as: ACTOS     vitamin D 25 MCG (1000 UT) Tabs tablet  Commonly known as: CHOLECALCIFEROL            STOP taking these medications      LYCOPENE PO     pravastatin 20 MG tablet  Commonly known as: PRAVACHOL               Where to Get Your Medications        These medications were sent to [facility] [address] -  [phone] - F [phone]  [address] [place] [id number]      Phone: [phone]   acetaminophen 500 MG tablet  atorvastatin 40 MG tablet  melatonin 5 MG Tbdp disintegrating tablet  metoprolol succinate 50 MG extended release tablet         Disposition:   If discharged to Home, Any [facility] needs that were indicated and/or required as been addressed and set up by Social Work.     Condition at discharge: good     Activity: activity as tolerated    Total time taken for discharging this patient: 40 minutes. Greater than 70% of time was spent focused exclusively on this patient. Time was taken to review chart, discuss plans with  consultants, reconciling medications, discussing plan answering questions with patient.     Signed:  Trey Anderson MD  4/17/2024, 5:33 PM  ----------------------------------------------------------------------------------------------------------------------    Dolores Lopez,

## 2024-04-18 LAB
EKG ATRIAL RATE: 74 BPM
EKG P AXIS: 28 DEGREES
EKG P-R INTERVAL: 178 MS
EKG Q-T INTERVAL: 452 MS
EKG QRS DURATION: 134 MS
EKG QTC CALCULATION (BAZETT): 501 MS
EKG R AXIS: -49 DEGREES
EKG T AXIS: 62 DEGREES
EKG VENTRICULAR RATE: 74 BPM

## 2024-04-18 PROCEDURE — 93010 ELECTROCARDIOGRAM REPORT: CPT | Performed by: INTERNAL MEDICINE

## 2024-04-19 LAB
BACTERIA BLD CULT ORG #2: NORMAL
BACTERIA BLD CULT: NORMAL

## 2024-04-22 LAB — ECHO BSA: 1.54 M2

## 2024-04-25 ENCOUNTER — OFFICE VISIT (OUTPATIENT)
Dept: CARDIOLOGY CLINIC | Age: 84
End: 2024-04-25
Payer: MEDICARE

## 2024-04-25 VITALS
SYSTOLIC BLOOD PRESSURE: 110 MMHG | WEIGHT: 125.4 LBS | HEART RATE: 67 BPM | OXYGEN SATURATION: 97 % | BODY MASS INDEX: 25.31 KG/M2 | DIASTOLIC BLOOD PRESSURE: 60 MMHG

## 2024-04-25 DIAGNOSIS — R42 DIZZINESS: Primary | ICD-10-CM

## 2024-04-25 DIAGNOSIS — I49.5 SSS (SICK SINUS SYNDROME) (HCC): ICD-10-CM

## 2024-04-25 DIAGNOSIS — I10 HYPERTENSION, UNSPECIFIED TYPE: ICD-10-CM

## 2024-04-25 DIAGNOSIS — I05.0 MITRAL VALVE STENOSIS, UNSPECIFIED ETIOLOGY: ICD-10-CM

## 2024-04-25 DIAGNOSIS — Z95.0 PACEMAKER: ICD-10-CM

## 2024-04-25 PROCEDURE — 3074F SYST BP LT 130 MM HG: CPT | Performed by: INTERNAL MEDICINE

## 2024-04-25 PROCEDURE — 1123F ACP DISCUSS/DSCN MKR DOCD: CPT | Performed by: INTERNAL MEDICINE

## 2024-04-25 PROCEDURE — 3078F DIAST BP <80 MM HG: CPT | Performed by: INTERNAL MEDICINE

## 2024-04-25 PROCEDURE — 99214 OFFICE O/P EST MOD 30 MIN: CPT | Performed by: INTERNAL MEDICINE

## 2024-04-25 ASSESSMENT — ENCOUNTER SYMPTOMS
COUGH: 0
CHEST TIGHTNESS: 0
SHORTNESS OF BREATH: 0
BLOOD IN STOOL: 0
NAUSEA: 0
STRIDOR: 0
WHEEZING: 0
EYES NEGATIVE: 1
RESPIRATORY NEGATIVE: 1
GASTROINTESTINAL NEGATIVE: 1

## 2024-05-02 ENCOUNTER — OFFICE VISIT (OUTPATIENT)
Dept: CARDIOLOGY | Facility: CLINIC | Age: 84
End: 2024-05-02
Payer: MEDICARE

## 2024-05-02 ENCOUNTER — LAB (OUTPATIENT)
Dept: LAB | Facility: LAB | Age: 84
End: 2024-05-02
Payer: MEDICARE

## 2024-05-02 VITALS
SYSTOLIC BLOOD PRESSURE: 110 MMHG | HEART RATE: 60 BPM | BODY MASS INDEX: 26.83 KG/M2 | WEIGHT: 124 LBS | DIASTOLIC BLOOD PRESSURE: 58 MMHG

## 2024-05-02 DIAGNOSIS — F02.A0 MILD LATE ONSET ALZHEIMER'S DEMENTIA WITHOUT BEHAVIORAL DISTURBANCE, PSYCHOTIC DISTURBANCE, MOOD DISTURBANCE, OR ANXIETY (MULTI): ICD-10-CM

## 2024-05-02 DIAGNOSIS — Z78.9 NEVER SMOKED TOBACCO: ICD-10-CM

## 2024-05-02 DIAGNOSIS — E11.65 TYPE 2 DIABETES MELLITUS WITH HYPERGLYCEMIA, WITHOUT LONG-TERM CURRENT USE OF INSULIN (MULTI): ICD-10-CM

## 2024-05-02 DIAGNOSIS — G30.1 MILD LATE ONSET ALZHEIMER'S DEMENTIA WITHOUT BEHAVIORAL DISTURBANCE, PSYCHOTIC DISTURBANCE, MOOD DISTURBANCE, OR ANXIETY (MULTI): ICD-10-CM

## 2024-05-02 DIAGNOSIS — I10 ESSENTIAL HYPERTENSION: ICD-10-CM

## 2024-05-02 DIAGNOSIS — E55.9 VITAMIN D DEFICIENCY: ICD-10-CM

## 2024-05-02 DIAGNOSIS — E11.51 TYPE 2 DIABETES MELLITUS WITH DIABETIC PERIPHERAL ANGIOPATHY WITHOUT GANGRENE, WITHOUT LONG-TERM CURRENT USE OF INSULIN (MULTI): ICD-10-CM

## 2024-05-02 DIAGNOSIS — E78.00 PURE HYPERCHOLESTEROLEMIA WITH TARGET LOW DENSITY LIPOPROTEIN (LDL) CHOLESTEROL LESS THAN 70 MG/DL: ICD-10-CM

## 2024-05-02 DIAGNOSIS — I25.10 CAD S/P PERCUTANEOUS CORONARY ANGIOPLASTY: ICD-10-CM

## 2024-05-02 DIAGNOSIS — E78.41 ELEVATED LIPOPROTEIN(A): ICD-10-CM

## 2024-05-02 DIAGNOSIS — I34.2 NONRHEUMATIC MITRAL VALVE STENOSIS: ICD-10-CM

## 2024-05-02 DIAGNOSIS — R41.89 COGNITIVE DECLINE: ICD-10-CM

## 2024-05-02 DIAGNOSIS — R41.3 MEMORY LOSS: ICD-10-CM

## 2024-05-02 DIAGNOSIS — Z95.0 PACEMAKER: ICD-10-CM

## 2024-05-02 DIAGNOSIS — Z98.61 CAD S/P PERCUTANEOUS CORONARY ANGIOPLASTY: ICD-10-CM

## 2024-05-02 DIAGNOSIS — E53.8 VITAMIN B12 DEFICIENCY: ICD-10-CM

## 2024-05-02 DIAGNOSIS — D64.9 ANEMIA, NORMOCYTIC NORMOCHROMIC: ICD-10-CM

## 2024-05-02 LAB
25(OH)D3 SERPL-MCNC: 48 NG/ML (ref 30–100)
ALBUMIN SERPL BCP-MCNC: 3.9 G/DL (ref 3.4–5)
ALP SERPL-CCNC: 62 U/L (ref 33–136)
ALT SERPL W P-5'-P-CCNC: 21 U/L (ref 7–45)
ANION GAP SERPL CALC-SCNC: 13 MMOL/L (ref 10–20)
APPEARANCE UR: ABNORMAL
AST SERPL W P-5'-P-CCNC: 29 U/L (ref 9–39)
BACTERIA #/AREA URNS AUTO: ABNORMAL /HPF
BASOPHILS # BLD AUTO: 0.03 X10*3/UL (ref 0–0.1)
BASOPHILS NFR BLD AUTO: 0.5 %
BILIRUB SERPL-MCNC: 0.4 MG/DL (ref 0–1.2)
BILIRUB UR STRIP.AUTO-MCNC: NEGATIVE MG/DL
BUN SERPL-MCNC: 15 MG/DL (ref 6–23)
CALCIUM SERPL-MCNC: 9.7 MG/DL (ref 8.6–10.3)
CHLORIDE SERPL-SCNC: 104 MMOL/L (ref 98–107)
CHOLEST SERPL-MCNC: 94 MG/DL (ref 0–199)
CHOLESTEROL/HDL RATIO: 2.1
CO2 SERPL-SCNC: 31 MMOL/L (ref 21–32)
COLOR UR: YELLOW
CREAT SERPL-MCNC: 0.8 MG/DL (ref 0.5–1.05)
CREAT UR-MCNC: 126.4 MG/DL (ref 20–320)
EGFRCR SERPLBLD CKD-EPI 2021: 73 ML/MIN/1.73M*2
EOSINOPHIL # BLD AUTO: 0.14 X10*3/UL (ref 0–0.4)
EOSINOPHIL NFR BLD AUTO: 2.4 %
ERYTHROCYTE [DISTWIDTH] IN BLOOD BY AUTOMATED COUNT: 14.3 % (ref 11.5–14.5)
EST. AVERAGE GLUCOSE BLD GHB EST-MCNC: 171 MG/DL
FOLATE SERPL-MCNC: >22.3 NG/ML
GLUCOSE SERPL-MCNC: 116 MG/DL (ref 74–99)
GLUCOSE UR STRIP.AUTO-MCNC: NEGATIVE MG/DL
HBA1C MFR BLD: 7.6 %
HCT VFR BLD AUTO: 35.5 % (ref 36–46)
HDLC SERPL-MCNC: 44.1 MG/DL
HGB BLD-MCNC: 11.5 G/DL (ref 12–16)
IMM GRANULOCYTES # BLD AUTO: 0.01 X10*3/UL (ref 0–0.5)
IMM GRANULOCYTES NFR BLD AUTO: 0.2 % (ref 0–0.9)
KETONES UR STRIP.AUTO-MCNC: NEGATIVE MG/DL
LDLC SERPL CALC-MCNC: 28 MG/DL
LEUKOCYTE ESTERASE UR QL STRIP.AUTO: ABNORMAL
LYMPHOCYTES # BLD AUTO: 1.81 X10*3/UL (ref 0.8–3)
LYMPHOCYTES NFR BLD AUTO: 31.4 %
MAGNESIUM SERPL-MCNC: 1.64 MG/DL (ref 1.6–2.4)
MCH RBC QN AUTO: 30.7 PG (ref 26–34)
MCHC RBC AUTO-ENTMCNC: 32.4 G/DL (ref 32–36)
MCV RBC AUTO: 95 FL (ref 80–100)
MICROALBUMIN UR-MCNC: 8.3 MG/L
MICROALBUMIN/CREAT UR: 6.6 UG/MG CREAT
MONOCYTES # BLD AUTO: 0.38 X10*3/UL (ref 0.05–0.8)
MONOCYTES NFR BLD AUTO: 6.6 %
MUCOUS THREADS #/AREA URNS AUTO: ABNORMAL /LPF
NEUTROPHILS # BLD AUTO: 3.4 X10*3/UL (ref 1.6–5.5)
NEUTROPHILS NFR BLD AUTO: 58.9 %
NITRITE UR QL STRIP.AUTO: NEGATIVE
NON HDL CHOLESTEROL: 50 MG/DL (ref 0–149)
NRBC BLD-RTO: 0 /100 WBCS (ref 0–0)
PH UR STRIP.AUTO: 6 [PH]
PLATELET # BLD AUTO: 231 X10*3/UL (ref 150–450)
POTASSIUM SERPL-SCNC: 4.7 MMOL/L (ref 3.5–5.3)
PROT SERPL-MCNC: 6.8 G/DL (ref 6.4–8.2)
PROT UR STRIP.AUTO-MCNC: NEGATIVE MG/DL
RBC # BLD AUTO: 3.74 X10*6/UL (ref 4–5.2)
RBC # UR STRIP.AUTO: NEGATIVE /UL
RBC #/AREA URNS AUTO: ABNORMAL /HPF
SODIUM SERPL-SCNC: 143 MMOL/L (ref 136–145)
SP GR UR STRIP.AUTO: 1.02
SQUAMOUS #/AREA URNS AUTO: ABNORMAL /HPF
TRIGL SERPL-MCNC: 110 MG/DL (ref 0–149)
TSH SERPL-ACNC: 2.26 MIU/L (ref 0.44–3.98)
UROBILINOGEN UR STRIP.AUTO-MCNC: 2 MG/DL
VIT B12 SERPL-MCNC: 1220 PG/ML (ref 211–911)
VLDL: 22 MG/DL (ref 0–40)
WBC # BLD AUTO: 5.8 X10*3/UL (ref 4.4–11.3)
WBC #/AREA URNS AUTO: ABNORMAL /HPF

## 2024-05-02 PROCEDURE — 82570 ASSAY OF URINE CREATININE: CPT

## 2024-05-02 PROCEDURE — 83036 HEMOGLOBIN GLYCOSYLATED A1C: CPT

## 2024-05-02 PROCEDURE — 1160F RVW MEDS BY RX/DR IN RCRD: CPT | Performed by: INTERNAL MEDICINE

## 2024-05-02 PROCEDURE — 80053 COMPREHEN METABOLIC PANEL: CPT

## 2024-05-02 PROCEDURE — 87086 URINE CULTURE/COLONY COUNT: CPT

## 2024-05-02 PROCEDURE — 82607 VITAMIN B-12: CPT

## 2024-05-02 PROCEDURE — 80061 LIPID PANEL: CPT

## 2024-05-02 PROCEDURE — 82746 ASSAY OF FOLIC ACID SERUM: CPT

## 2024-05-02 PROCEDURE — 85025 COMPLETE CBC W/AUTO DIFF WBC: CPT

## 2024-05-02 PROCEDURE — 3074F SYST BP LT 130 MM HG: CPT | Performed by: INTERNAL MEDICINE

## 2024-05-02 PROCEDURE — 3078F DIAST BP <80 MM HG: CPT | Performed by: INTERNAL MEDICINE

## 2024-05-02 PROCEDURE — 82728 ASSAY OF FERRITIN: CPT

## 2024-05-02 PROCEDURE — 83550 IRON BINDING TEST: CPT

## 2024-05-02 PROCEDURE — 1124F ACP DISCUSS-NO DSCNMKR DOCD: CPT | Performed by: INTERNAL MEDICINE

## 2024-05-02 PROCEDURE — 82043 UR ALBUMIN QUANTITATIVE: CPT

## 2024-05-02 PROCEDURE — 83735 ASSAY OF MAGNESIUM: CPT

## 2024-05-02 PROCEDURE — 1036F TOBACCO NON-USER: CPT | Performed by: INTERNAL MEDICINE

## 2024-05-02 PROCEDURE — 82306 VITAMIN D 25 HYDROXY: CPT

## 2024-05-02 PROCEDURE — 1159F MED LIST DOCD IN RCRD: CPT | Performed by: INTERNAL MEDICINE

## 2024-05-02 PROCEDURE — 99214 OFFICE O/P EST MOD 30 MIN: CPT | Performed by: INTERNAL MEDICINE

## 2024-05-02 PROCEDURE — 83540 ASSAY OF IRON: CPT

## 2024-05-02 PROCEDURE — 36415 COLL VENOUS BLD VENIPUNCTURE: CPT

## 2024-05-02 PROCEDURE — 84443 ASSAY THYROID STIM HORMONE: CPT

## 2024-05-02 PROCEDURE — 81001 URINALYSIS AUTO W/SCOPE: CPT

## 2024-05-02 RX ORDER — METOPROLOL SUCCINATE 50 MG/1
1 TABLET, EXTENDED RELEASE ORAL DAILY
COMMUNITY
Start: 2024-04-17

## 2024-05-02 RX ORDER — ATORVASTATIN CALCIUM 40 MG/1
1 TABLET, FILM COATED ORAL NIGHTLY
COMMUNITY
Start: 2024-04-16

## 2024-05-02 NOTE — PROGRESS NOTES
Referred by Dr. Tran ref. provider found provider found for   Chief Complaint   Patient presents with    Follow-up    Hospital Follow-up    Pacemaker Problem        History of Present Illness  Marci Han is a 84 y.o. year old female patient following discharge from the hospital.  She was admitted to [facility] with heart block requiring permanent pacemaker.  Her echo showed gradient of 10 mm across the mitral valve they called as severe mitral stenosis although I think is not as severe.  She does not have significant dilatation of left atrium.  Discussed with the patient great length that we will monitor her valve and repeat the echo later.  She does need to follow-up with pacemaker clinic.  She will see me back as scheduled    Past Medical History  Past Medical History:   Diagnosis Date    Encounter for general adult medical examination without abnormal findings     Health care maintenance    Personal history of diseases of the skin and subcutaneous tissue     History of eczema    Personal history of other diseases of the musculoskeletal system and connective tissue     History of bursitis    Personal history of other diseases of the nervous system and sense organs     History of eye pain    Personal history of other diseases of urinary system     History of hematuria    Personal history of other endocrine, nutritional and metabolic disease     History of vitamin D deficiency    Personal history of other infectious and parasitic diseases     History of candidiasis    Personal history of other specified conditions     History of chronic pain    Primary osteoarthritis, unspecified ankle and foot     Primary osteoarthritis of foot, unspecified laterality    Unspecified osteoarthritis, unspecified site 07/16/2019    Osteoarthritis    Urinary tract infection, site not specified     Bacterial UTI       Social History  Social History     Tobacco Use    Smoking status: Never    Smokeless tobacco: Never   Substance Use  Topics    Alcohol use: Not Currently    Drug use: Never       Family History     Family History   Problem Relation Name Age of Onset    Diabetes Mother      Hypertension Mother      Diabetes Father      Hypertension Father      Heart attack Father         Review of Systems  As per HPI, all other systems reviewed and negative.    Allergies:  Allergies   Allergen Reactions    Iodinated Contrast Media Unknown    Lovastatin Unknown    Meloxicam Unknown    Naproxen Unknown    Pravastatin Unknown    Shellfish Containing Products Unknown    Shellfish Derived Hives        Outpatient Medications:  Current Outpatient Medications   Medication Instructions    aspirin 81 mg chewable tablet 1 tablet, oral, Daily    atorvastatin (Lipitor) 40 mg tablet 1 tablet, oral, Nightly    blood sugar diagnostic (Accu-Chek Lucila Plus test strp) strip Daily RT    cholecalciferol (Vitamin D-3) 25 MCG (1000 UT) tablet oral    cyanocobalamin (Vitamin B-12) 100 mcg tablet 1 tablet, oral, Daily    donepezil (ARICEPT) 5 mg, oral, Nightly    glimepiride (Amaryl) 2 mg tablet PLEASE TAKE 1 TABLET BY MOUTH BEFORE BIGGEST MEAL OF THE DAY. DO NOT TAKE IF YOU ARE NOT GOING TO EAT. THANK YOU.    lancets misc miscellaneous, Test 1 time daily    lisinopril 10 mg tablet Please take only 1 tablet by mouth every day.  Thank you.    metoprolol succinate XL (Toprol-XL) 50 mg 24 hr tablet 1 tablet, oral, Daily    multivitamin with minerals iron-free (Centrum Silver) 1 tablet, oral, Daily    pioglitazone (Actos) 30 mg tablet TAKE 1 TABLET DAILY         Vitals:  Vitals:    05/02/24 1034   BP: 110/58   Pulse: 60       Physical Exam:  Physical Exam  Vitals and nursing note reviewed.   Constitutional:       Appearance: Normal appearance. She is normal weight.   HENT:      Head: Normocephalic and atraumatic.   Eyes:      Extraocular Movements: Extraocular movements intact.      Pupils: Pupils are equal, round, and reactive to light.   Cardiovascular:      Rate and  Rhythm: Normal rate and regular rhythm.      Pulses: Normal pulses.   Pulmonary:      Effort: Pulmonary effort is normal.      Breath sounds: Normal breath sounds.   Chest:      Comments: Left sided device pocket- no lump or hematoma, denies pain  Musculoskeletal:      Cervical back: Normal range of motion.      Right lower leg: No edema.      Left lower leg: No edema.   Skin:     General: Skin is warm and dry.   Neurological:      General: No focal deficit present.      Mental Status: She is alert and oriented to person, place, and time.             Assessment/Plan   Diagnoses and all orders for this visit:  CAD S/P percutaneous coronary angioplasty  Essential hypertension  Elevated lipoprotein(a)  Pure hypercholesterolemia with target low density lipoprotein (LDL) cholesterol less than 70 mg/dL  Pacemaker  Type 2 diabetes mellitus with diabetic peripheral angiopathy without gangrene, without long-term current use of insulin (Multi)  BMI 26.0-26.9,adult  Mild late onset Alzheimer's dementia without behavioral disturbance, psychotic disturbance, mood disturbance, or anxiety (Multi)  Never smoked tobacco  Nonrheumatic mitral valve stenosis          Freddy Whitmore MD [facility]  Interventional Cardiology   of [facility]     Thank you for allowing me to participate in the care of this patient. Please do not hesitate to contact me with any further questions or concerns.

## 2024-05-02 NOTE — PATIENT INSTRUCTIONS
Patient to follow up in September with Dr. Freddy Whitmore MD [facility]     Let us know when she sees the pacemaker clinic and ask that reports also be sent to Dr. Freddy Whitmore MD [facility]   Fax     Office will arrange repeat Echo before seeing us again.     No other changes today.   Continue same medications and treatments.   Patient educated on proper medication use.   Patient educated on risk factor modification.   Please bring any lab results from other providers / physicians to your next appointment.     Please bring all medicines, vitamins, and herbal supplements with you when you come to the office.     Prescriptions will not be filled unless you are compliant with your follow up appointments or have a follow up appointment scheduled as per instruction of your physician. Refills should be requested at the time of your visit.    IDaron RN am scribing for and in the presence of Dr. Freddy Whitmore MD [facility]

## 2024-05-03 LAB
BACTERIA UR CULT: NORMAL
HOLD SPECIMEN: NORMAL

## 2024-05-06 ENCOUNTER — TELEPHONE (OUTPATIENT)
Dept: PRIMARY CARE | Facility: CLINIC | Age: 84
End: 2024-05-06

## 2024-05-06 ENCOUNTER — OFFICE VISIT (OUTPATIENT)
Dept: PRIMARY CARE | Facility: CLINIC | Age: 84
End: 2024-05-06
Payer: MEDICARE

## 2024-05-06 VITALS
BODY MASS INDEX: 27.12 KG/M2 | WEIGHT: 125.7 LBS | HEIGHT: 57 IN | DIASTOLIC BLOOD PRESSURE: 59 MMHG | SYSTOLIC BLOOD PRESSURE: 142 MMHG | HEART RATE: 82 BPM

## 2024-05-06 DIAGNOSIS — I10 ESSENTIAL HYPERTENSION: ICD-10-CM

## 2024-05-06 DIAGNOSIS — F02.A0 MILD LATE ONSET ALZHEIMER'S DEMENTIA WITHOUT BEHAVIORAL DISTURBANCE, PSYCHOTIC DISTURBANCE, MOOD DISTURBANCE, OR ANXIETY (MULTI): ICD-10-CM

## 2024-05-06 DIAGNOSIS — E55.9 VITAMIN D DEFICIENCY: ICD-10-CM

## 2024-05-06 DIAGNOSIS — E11.65 TYPE 2 DIABETES MELLITUS WITH HYPERGLYCEMIA, WITHOUT LONG-TERM CURRENT USE OF INSULIN (MULTI): ICD-10-CM

## 2024-05-06 DIAGNOSIS — E78.00 PURE HYPERCHOLESTEROLEMIA WITH TARGET LOW DENSITY LIPOPROTEIN (LDL) CHOLESTEROL LESS THAN 70 MG/DL: ICD-10-CM

## 2024-05-06 DIAGNOSIS — D64.9 ANEMIA, NORMOCYTIC NORMOCHROMIC: Primary | ICD-10-CM

## 2024-05-06 DIAGNOSIS — E11.51 TYPE 2 DIABETES MELLITUS WITH DIABETIC PERIPHERAL ANGIOPATHY WITHOUT GANGRENE, WITHOUT LONG-TERM CURRENT USE OF INSULIN (MULTI): ICD-10-CM

## 2024-05-06 DIAGNOSIS — Z71.89 CARDIAC RISK COUNSELING: ICD-10-CM

## 2024-05-06 DIAGNOSIS — E11.9 DIABETES MELLITUS WITHOUT COMPLICATION (MULTI): ICD-10-CM

## 2024-05-06 DIAGNOSIS — F41.8 ANXIETY WITH DEPRESSION: ICD-10-CM

## 2024-05-06 DIAGNOSIS — G30.1 MILD LATE ONSET ALZHEIMER'S DEMENTIA WITHOUT BEHAVIORAL DISTURBANCE, PSYCHOTIC DISTURBANCE, MOOD DISTURBANCE, OR ANXIETY (MULTI): ICD-10-CM

## 2024-05-06 DIAGNOSIS — E53.8 VITAMIN B12 DEFICIENCY: ICD-10-CM

## 2024-05-06 LAB
FERRITIN SERPL-MCNC: 71 NG/ML (ref 8–150)
IRON SATN MFR SERPL: 18 % (ref 25–45)
IRON SERPL-MCNC: 60 UG/DL (ref 35–150)
TIBC SERPL-MCNC: 332 UG/DL (ref 240–445)
UIBC SERPL-MCNC: 272 UG/DL (ref 110–370)

## 2024-05-06 PROCEDURE — 1123F ACP DISCUSS/DSCN MKR DOCD: CPT | Performed by: INTERNAL MEDICINE

## 2024-05-06 PROCEDURE — 1036F TOBACCO NON-USER: CPT | Performed by: INTERNAL MEDICINE

## 2024-05-06 PROCEDURE — 1159F MED LIST DOCD IN RCRD: CPT | Performed by: INTERNAL MEDICINE

## 2024-05-06 PROCEDURE — 1160F RVW MEDS BY RX/DR IN RCRD: CPT | Performed by: INTERNAL MEDICINE

## 2024-05-06 PROCEDURE — 3077F SYST BP >= 140 MM HG: CPT | Performed by: INTERNAL MEDICINE

## 2024-05-06 PROCEDURE — 99214 OFFICE O/P EST MOD 30 MIN: CPT | Performed by: INTERNAL MEDICINE

## 2024-05-06 PROCEDURE — 3078F DIAST BP <80 MM HG: CPT | Performed by: INTERNAL MEDICINE

## 2024-05-06 RX ORDER — PAROXETINE 10 MG/1
TABLET, FILM COATED ORAL
Qty: 90 TABLET | Refills: 0 | Status: SHIPPED | OUTPATIENT
Start: 2024-05-06

## 2024-05-06 RX ORDER — BUSPIRONE HYDROCHLORIDE 5 MG/1
TABLET ORAL
Qty: 45 TABLET | Refills: 0 | Status: SHIPPED | OUTPATIENT
Start: 2024-05-06

## 2024-05-06 RX ORDER — DONEPEZIL HYDROCHLORIDE 5 MG/1
5 TABLET, FILM COATED ORAL NIGHTLY
Qty: 90 TABLET | Refills: 1 | Status: SHIPPED | OUTPATIENT
Start: 2024-05-06 | End: 2024-11-02

## 2024-05-06 NOTE — PROGRESS NOTES
"Subjective   Patient ID: Marci Han is a 84 y.o. female who presents for Annual Exam (Complete Physical Exam).    HPI     Review of Systems    Objective   /59 (BP Location: Left arm, Patient Position: Sitting, BP Cuff Size: Adult)   Pulse 82   Ht 1.448 m (4' 9\")   Wt 57 kg (125 lb 11.2 oz)   BMI 27.20 kg/m²     Physical Exam    Assessment/Plan   Diagnoses and all orders for this visit:  Anemia, normocytic normochromic  -     Ferritin; Future  -     Iron and TIBC; Future  -     Fecal Occult Blood Immunoassy  -     Follow Up In Primary Care - Established; Future  Vitamin B12 deficiency  -     Follow Up In Primary Care - Established; Future  Type 2 diabetes mellitus with hyperglycemia, without long-term current use of insulin (Multi)  -     Follow Up In Primary Care - [facility]; Future  Essential hypertension  -     Follow Up In Primary Care - Established; Future  Pure hypercholesterolemia with target low density lipoprotein (LDL) cholesterol less than 70 mg/dL  -     Follow Up In Primary Care - [facility]; Future  Cardiac risk counseling  -     Follow Up In Primary Care - [facility]; Future  Mild late onset Alzheimer's dementia without behavioral disturbance, psychotic disturbance, mood disturbance, or anxiety (Multi)  -     Follow Up In Primary Care - Established  -     PARoxetine (Paxil) 10 mg tablet; Please take 1 tablet by mouth in the evening with SUPPER.  Thank you.  -     donepezil (Aricept) 5 mg tablet; Take 1 tablet (5 mg) by mouth once daily at bedtime.  -     Follow Up In Primary Care - Established; Future  Vitamin D deficiency  -     Follow Up In Primary Care - Established; Future  Anxiety with depression  -     PARoxetine (Paxil) 10 mg tablet; Please take 1 tablet by mouth in the evening with SUPPER.  Thank you.  -     busPIRone (Buspar) 5 mg tablet; Please take 1 tablet by mouth every 8-12 hours as needed for anxiety, insomnia.  Watch out for daytime sleepiness.  Thank you.  -     " Follow Up In Primary Care - Established; Future       Thank you very much for coming.  I am very happy to see you.    I am glad to hear that you have been taking your medications!    I am also glad to hear that you would like to do more to strengthen your memory!    Please continue taking your DONEPEZIL/Aricept 5 mg at bedtime.    Let us add PAROXETINE 10 mg by mouth with SUPPER every evening.  This will help with nerves, and can also help with sleep.    If you are not getting extra nervous throughout the day, consider taking BUSPIRONE 5 mg.  Take this every 8 hours as needed only for nerves or sleep.  Watch out for daytime sleepiness.  Never take with alcohol.    In the meantime, you do have ANEMIA, but your VITAMIN B12 is already normal and replenished!  Please continue taking vitamin B12 supplement.  Please continue taking a MULTIVITAMIN like Centrum Silver every day as well.    Please submit a STOOL sample via the stool sample kit.  All you have to do is to use the stick applicator and stab it into your poop, then send it in the mail.  If the stool HAS BLOOD, we will discuss possibly having a COLONOSCOPY done.  If the stool HAS NO BLOOD, then I would recommend that you see a blood specialist or HEMATOLOGIST.    I am also going to check the IRON in your blood.  If your iron is LOW, then we have to figure out why you are losing iron.  Again, let us wait on the results, and I will send word regarding possible changes.    Your hemoglobin A1c or blood sugar marker is 7.6, better.  Please continue eating sensibly and avoiding excessive sugar and starch, including white flour.  Thank you for your efforts.  You are doing better.    You can cancel your appointment with ENDOCRINOLOGY for now.    Please come back in 2 months.  It will be time to see how you are doing with your new medications.  It will also be a good time to have you wear a gown, so that we can check your skin and also do a breast examination.    Until  then, please continue to take care of yourself and each other, and please continue to pray for our recovery from this pandemic.            0  Return in 2 months.  40 minutes please.  Complete physical examination, skin survey, breast examination.  Coordinate with cardiology, EP, possibly hematology versus GI depending on presentation.  Consider referral back to endocrinology if needed.  Review preventive strategies, possibly coordinate with podiatry, ophthalmology.  Reassess mood, energy, function with addition of paroxetine, buspirone.  Rule out caregiver stress, self-neglect.            0

## 2024-05-06 NOTE — PATIENT INSTRUCTIONS
Thank you very much for coming.  I am very happy to see you.    I am glad to hear that you have been taking your medications!    I am also glad to hear that you would like to do more to strengthen your memory!    Please continue taking your DONEPEZIL/Aricept 5 mg at bedtime.    Let us add PAROXETINE 10 mg by mouth with SUPPER every evening.  This will help with nerves, and can also help with sleep.    If you are not getting extra nervous throughout the day, consider taking BUSPIRONE 5 mg.  Take this every 8 hours as needed only for nerves or sleep.  Watch out for daytime sleepiness.  Never take with alcohol.    In the meantime, you do have ANEMIA, but your VITAMIN B12 is already normal and replenished!  Please continue taking vitamin B12 supplement.  Please continue taking a MULTIVITAMIN like Centrum Silver every day as well.    Please submit a STOOL sample via the stool sample kit.  All you have to do is to use the stick applicator and stab it into your poop, then send it in the mail.  If the stool HAS BLOOD, we will discuss possibly having a COLONOSCOPY done.  If the stool HAS NO BLOOD, then I would recommend that you see a blood specialist or HEMATOLOGIST.    I am also going to check the IRON in your blood.  If your iron is LOW, then we have to figure out why you are losing iron.  Again, let us wait on the results, and I will send word regarding possible changes.    Your hemoglobin A1c or blood sugar marker is 7.6, better.  Please continue eating sensibly and avoiding excessive sugar and starch, including white flour.  Thank you for your efforts.  You are doing better.    You can cancel your appointment with ENDOCRINOLOGY for now.    Please come back in 2 months.  It will be time to see how you are doing with your new medications.  It will also be a good time to have you wear a gown, so that we can check your skin and also do a breast examination.    Until then, please continue to take care of yourself and each  other, and please continue to pray for our recovery from this pandemic.            0  Return in 2 months.  40 minutes please.  Complete physical examination, skin survey, breast examination.  Coordinate with cardiology, EP, possibly hematology versus GI depending on presentation.  Consider referral back to endocrinology if needed.  Review preventive strategies, possibly coordinate with podiatry, ophthalmology.  Reassess mood, energy, function with addition of paroxetine, buspirone.  Rule out caregiver stress, self-neglect.            0

## 2024-05-07 RX ORDER — GLIMEPIRIDE 2 MG/1
TABLET ORAL
Qty: 90 TABLET | Refills: 0 | Status: SHIPPED | OUTPATIENT
Start: 2024-05-07

## 2024-05-07 RX ORDER — LISINOPRIL 10 MG/1
TABLET ORAL
Qty: 90 TABLET | Refills: 0 | Status: SHIPPED | OUTPATIENT
Start: 2024-05-07

## 2024-05-07 RX ORDER — PIOGLITAZONEHYDROCHLORIDE 30 MG/1
30 TABLET ORAL DAILY
Qty: 90 TABLET | Refills: 0 | Status: SHIPPED | OUTPATIENT
Start: 2024-05-07 | End: 2024-08-05

## 2024-05-16 ENCOUNTER — HOSPITAL ENCOUNTER (OUTPATIENT)
Dept: CARDIOLOGY | Age: 84
Discharge: HOME | End: 2024-05-16
Payer: MEDICARE

## 2024-05-16 PROCEDURE — 93280 PM DEVICE PROGR EVAL DUAL: CPT

## 2024-07-08 ENCOUNTER — APPOINTMENT (OUTPATIENT)
Dept: PRIMARY CARE | Facility: CLINIC | Age: 84
End: 2024-07-08
Payer: MEDICARE

## 2024-07-08 VITALS
WEIGHT: 130 LBS | DIASTOLIC BLOOD PRESSURE: 76 MMHG | HEIGHT: 57 IN | HEART RATE: 78 BPM | OXYGEN SATURATION: 96 % | SYSTOLIC BLOOD PRESSURE: 138 MMHG | BODY MASS INDEX: 28.05 KG/M2

## 2024-07-08 DIAGNOSIS — L82.1 SEBORRHEIC KERATOSIS: ICD-10-CM

## 2024-07-08 DIAGNOSIS — E78.00 PURE HYPERCHOLESTEROLEMIA WITH TARGET LOW DENSITY LIPOPROTEIN (LDL) CHOLESTEROL LESS THAN 70 MG/DL: ICD-10-CM

## 2024-07-08 DIAGNOSIS — E55.9 VITAMIN D DEFICIENCY: ICD-10-CM

## 2024-07-08 DIAGNOSIS — F02.A0 MILD LATE ONSET ALZHEIMER'S DEMENTIA WITHOUT BEHAVIORAL DISTURBANCE, PSYCHOTIC DISTURBANCE, MOOD DISTURBANCE, OR ANXIETY (MULTI): Primary | ICD-10-CM

## 2024-07-08 DIAGNOSIS — D51.9 ANEMIA DUE TO VITAMIN B12 DEFICIENCY, UNSPECIFIED B12 DEFICIENCY TYPE: ICD-10-CM

## 2024-07-08 DIAGNOSIS — I10 ESSENTIAL HYPERTENSION: ICD-10-CM

## 2024-07-08 DIAGNOSIS — E11.65 TYPE 2 DIABETES MELLITUS WITH HYPERGLYCEMIA, WITHOUT LONG-TERM CURRENT USE OF INSULIN (MULTI): ICD-10-CM

## 2024-07-08 DIAGNOSIS — R60.0 BILATERAL EDEMA OF LOWER EXTREMITY: ICD-10-CM

## 2024-07-08 DIAGNOSIS — F41.8 ANXIETY WITH DEPRESSION: ICD-10-CM

## 2024-07-08 DIAGNOSIS — Z71.89 CARDIAC RISK COUNSELING: ICD-10-CM

## 2024-07-08 DIAGNOSIS — G30.1 MILD LATE ONSET ALZHEIMER'S DEMENTIA WITHOUT BEHAVIORAL DISTURBANCE, PSYCHOTIC DISTURBANCE, MOOD DISTURBANCE, OR ANXIETY (MULTI): Primary | ICD-10-CM

## 2024-07-08 PROCEDURE — 3075F SYST BP GE 130 - 139MM HG: CPT | Performed by: INTERNAL MEDICINE

## 2024-07-08 PROCEDURE — 1036F TOBACCO NON-USER: CPT | Performed by: INTERNAL MEDICINE

## 2024-07-08 PROCEDURE — 1159F MED LIST DOCD IN RCRD: CPT | Performed by: INTERNAL MEDICINE

## 2024-07-08 PROCEDURE — 99213 OFFICE O/P EST LOW 20 MIN: CPT | Performed by: INTERNAL MEDICINE

## 2024-07-08 PROCEDURE — 3078F DIAST BP <80 MM HG: CPT | Performed by: INTERNAL MEDICINE

## 2024-07-08 PROCEDURE — 1123F ACP DISCUSS/DSCN MKR DOCD: CPT | Performed by: INTERNAL MEDICINE

## 2024-07-08 PROCEDURE — 1160F RVW MEDS BY RX/DR IN RCRD: CPT | Performed by: INTERNAL MEDICINE

## 2024-07-08 RX ORDER — DONEPEZIL HYDROCHLORIDE 10 MG/1
10 TABLET, FILM COATED ORAL NIGHTLY
Qty: 90 TABLET | Refills: 0 | Status: SHIPPED | OUTPATIENT
Start: 2024-07-08 | End: 2024-10-06

## 2024-07-08 NOTE — PROGRESS NOTES
"Subjective   Patient ID: Marci Han is a 84 y.o. female who presents for Annual Exam (Complete Physical Exam).    HPI     Review of Systems    Objective   /76 (BP Location: Left arm, Patient Position: Sitting, BP Cuff Size: Adult)   Pulse 78   Ht 1.448 m (4' 9\")   Wt 59 kg (130 lb)   SpO2 96%   BMI 28.13 kg/m²     Physical Exam    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 behavioral disturbance, psychotic disturbance, mood disturbance, or anxiety (Multi)  -     Follow Up In Primary Care - Established  -     donepezil (Aricept) 10 mg tablet; Take 1 tablet (10 mg) by mouth once daily at bedtime.  -     Follow Up In Primary Care - Established; Future  Type 2 diabetes mellitus with hyperglycemia, without long-term current use of insulin (Multi)  -     Referral to Podiatry; Future  -     Comprehensive Metabolic Panel; Future  -     Hemoglobin A1C; Future  -     Follow Up In Primary Care - Established; Future  Essential hypertension  -     CBC and Auto Differential; Future  -     Comprehensive Metabolic Panel; Future  -     TSH with reflex to Free T4 if abnormal; Future  -     Follow Up In Primary Care - Established; Future  Pure hypercholesterolemia with target low density lipoprotein (LDL) cholesterol less than 70 mg/dL  -     Comprehensive Metabolic Panel; Future  -     Follow Up In Primary Care - Established; Future  Cardiac risk counseling  -     Comprehensive Metabolic Panel; Future  -     Hemoglobin A1C; Future  -     Follow Up In Primary Care - Established; Future  Anemia due to vitamin B12 deficiency, unspecified B12 deficiency type  -     CBC and Auto Differential; Future  -     Vitamin B12; Future  -     Follow Up In Primary Care - Established; Future  Vitamin D deficiency  -     Comprehensive Metabolic Panel; Future  -     Follow Up In Primary Care - Established; Future  Anxiety with depression  -     Comprehensive Metabolic Panel; " Future  -     Follow Up In Primary Care - Established; Future  Bilateral edema of lower extremity  -     Comprehensive Metabolic Panel; Future  -     Follow Up In Primary Care - Established; Future  Seborrheic keratosis  -     Referral to Dermatology  -     CBC and Auto Differential; Future  -     Follow Up In Primary Care - Established; Future       Thank you very much for coming.  I am very happy to see you.    It will help you to try DONEPEZIL/ARICEPT now 10 mg by mouth with supper or at bedtime.  Watch out for sleepiness.  Continue taking the BUSPIRONE only as needed for nervousness.  Watch out for sleepiness.    In the future, we can see if we need to increase your PAROXETINE, but right now, it seems that the 10 mg is working well and keeping you very much functional!  Paroxetine is good for nerves and for mood.    In the future, there is a medication called MEMANTINE/NAMENDA which will be added to your donepezil/Aricept as needed to maintain your independence!    I do understand that your legs swell up on you by the end of the day.  Drink lots of fluids throughout the day.  Continue to avoid salt.  Elevate your legs on a stool or an ottoman whenever possible.    Yes, we can have you see a SKIN SPECIALIST, Dr. Pleitez.    We will also get you to see a FOOT SPECIALIST because of your history of DIABETES.  Please make an appointment with your EYE SPECIALIST as well.    Please come back in 2 months.  Do some NONFASTING laboratory examinations a few days prior via [facility]/ at the [facility] in [place], then see me soon after.  Again, no fasting necessary.  We will also schedule you for a complete physical examination if time will allow.    Until then, please continue to take care of yourself and each other, and please continue to pray for our recovery from this pandemic.    You will benefit from the SHINGLES vaccination.  Highly recommended by Medicare, and to be covered by your insurance.  SHINGRIX comes in a  series of 2 injections at least 1 month apart.  Very important, very helpful, and available from your local friendly pharmacy.  Please get this done anytime soon.  Highly recommended!            0  Return in 2 months.  40 minutes please.  COMPLETE physical examination.  Review nonfasting laboratory results.  Reassess mood, energy, function, rule out self-neglect, caregiver stress.  Coordinate with podiatry, ophthalmology, dermatology, cardiology, EP, as patient is seen.  Consider Namenda.  Consider referral to neurology.            0

## 2024-07-08 NOTE — PATIENT INSTRUCTIONS
Thank you very much for coming.  I am very happy to see you.    It will help you to try DONEPEZIL/ARICEPT now 10 mg by mouth with supper or at bedtime.  Watch out for sleepiness.  Continue taking the BUSPIRONE only as needed for nervousness.  Watch out for sleepiness.    In the future, we can see if we need to increase your PAROXETINE, but right now, it seems that the 10 mg is working well and keeping you very much functional!  Paroxetine is good for nerves and for mood.    In the future, there is a medication called MEMANTINE/NAMENDA which will be added to your donepezil/Aricept as needed to maintain your independence!    I do understand that your legs swell up on you by the end of the day.  Drink lots of fluids throughout the day.  Continue to avoid salt.  Elevate your legs on a stool or an ottoman whenever possible.    Yes, we can have you see a SKIN SPECIALIST, Dr. Pleitez.    We will also get you to see a FOOT SPECIALIST because of your history of DIABETES.  Please make an appointment with your EYE SPECIALIST as well.    Please come back in 2 months.  Do some NONFASTING laboratory examinations a few days prior via [facility]/ at the [facility], then see me soon after.  Again, no fasting necessary.  We will also schedule you for a complete physical examination if time will allow.    Until then, please continue to take care of yourself and each other, and please continue to pray for our recovery from this pandemic.    You will benefit from the SHINGLES vaccination.  Highly recommended by Medicare, and to be covered by your insurance.  SHINGRIX comes in a series of 2 injections at least 1 month apart.  Very important, very helpful, and available from your local friendly pharmacy.  Please get this done anytime soon.  Highly recommended!            0  Return in 2 months.  40 minutes please.  COMPLETE physical examination.  Review nonfasting laboratory results.  Reassess mood, energy, function, rule out  self-neglect, caregiver stress.  Coordinate with podiatry, ophthalmology, dermatology, cardiology, EP, as patient is seen.  Consider Namenda.  Consider referral to neurology.            0

## 2024-07-13 DIAGNOSIS — E11.65 INADEQUATELY CONTROLLED DIABETES MELLITUS (HCC): ICD-10-CM

## 2024-07-15 RX ORDER — BLOOD SUGAR DIAGNOSTIC
STRIP MISCELLANEOUS
Qty: 100 STRIP | Refills: 3 | Status: SHIPPED | OUTPATIENT
Start: 2024-07-15

## 2024-08-01 DIAGNOSIS — E11.51 TYPE 2 DIABETES MELLITUS WITH DIABETIC PERIPHERAL ANGIOPATHY WITHOUT GANGRENE, WITHOUT LONG-TERM CURRENT USE OF INSULIN (MULTI): ICD-10-CM

## 2024-08-01 DIAGNOSIS — F02.A0 MILD LATE ONSET ALZHEIMER'S DEMENTIA WITHOUT BEHAVIORAL DISTURBANCE, PSYCHOTIC DISTURBANCE, MOOD DISTURBANCE, OR ANXIETY (MULTI): ICD-10-CM

## 2024-08-01 DIAGNOSIS — E11.65 TYPE 2 DIABETES MELLITUS WITH HYPERGLYCEMIA, WITHOUT LONG-TERM CURRENT USE OF INSULIN (MULTI): ICD-10-CM

## 2024-08-01 DIAGNOSIS — F41.8 ANXIETY WITH DEPRESSION: ICD-10-CM

## 2024-08-01 DIAGNOSIS — E11.9 DIABETES MELLITUS WITHOUT COMPLICATION (MULTI): ICD-10-CM

## 2024-08-01 DIAGNOSIS — G30.1 MILD LATE ONSET ALZHEIMER'S DEMENTIA WITHOUT BEHAVIORAL DISTURBANCE, PSYCHOTIC DISTURBANCE, MOOD DISTURBANCE, OR ANXIETY (MULTI): ICD-10-CM

## 2024-08-02 RX ORDER — PAROXETINE 10 MG/1
TABLET, FILM COATED ORAL
Qty: 90 TABLET | Refills: 0 | Status: SHIPPED | OUTPATIENT
Start: 2024-08-02

## 2024-08-02 RX ORDER — PIOGLITAZONEHYDROCHLORIDE 30 MG/1
30 TABLET ORAL DAILY
Qty: 90 TABLET | Refills: 0 | Status: SHIPPED | OUTPATIENT
Start: 2024-08-02 | End: 2024-10-31

## 2024-08-02 RX ORDER — GLIMEPIRIDE 2 MG/1
TABLET ORAL
Qty: 90 TABLET | Refills: 0 | Status: SHIPPED | OUTPATIENT
Start: 2024-08-02

## 2024-08-06 DIAGNOSIS — I10 ESSENTIAL HYPERTENSION: ICD-10-CM

## 2024-08-06 DIAGNOSIS — E11.65 TYPE 2 DIABETES MELLITUS WITH HYPERGLYCEMIA, WITHOUT LONG-TERM CURRENT USE OF INSULIN (MULTI): ICD-10-CM

## 2024-08-07 RX ORDER — LISINOPRIL 10 MG/1
TABLET ORAL
Qty: 90 TABLET | Refills: 0 | Status: SHIPPED | OUTPATIENT
Start: 2024-08-07

## 2024-08-12 RX ORDER — ATORVASTATIN CALCIUM 40 MG/1
40 TABLET, FILM COATED ORAL NIGHTLY
Qty: 30 TABLET | Refills: 3 | OUTPATIENT
Start: 2024-08-12

## 2024-08-12 RX ORDER — METOPROLOL SUCCINATE 50 MG/1
50 TABLET, EXTENDED RELEASE ORAL DAILY
Qty: 30 TABLET | Refills: 3 | OUTPATIENT
Start: 2024-08-12

## 2024-08-13 RX ORDER — ATORVASTATIN CALCIUM 40 MG/1
40 TABLET, FILM COATED ORAL NIGHTLY
Qty: 90 TABLET | Refills: 3 | Status: SHIPPED | OUTPATIENT
Start: 2024-08-13

## 2024-08-13 RX ORDER — METOPROLOL SUCCINATE 50 MG/1
50 TABLET, EXTENDED RELEASE ORAL DAILY
Qty: 90 TABLET | Refills: 3 | Status: SHIPPED | OUTPATIENT
Start: 2024-08-13

## 2024-08-13 NOTE — TELEPHONE ENCOUNTER
Requesting medication refill. Please approve or deny this request.    Rx requested:  Requested Prescriptions     Pending Prescriptions Disp Refills    metoprolol succinate (TOPROL XL) 50 MG extended release tablet 30 tablet 3     Sig: Take 1 tablet by mouth daily         Last Office Visit:   4/25/2024      Next Visit Date:  Future Appointments   Date Time Provider Department Center   8/22/2024 10:00 AM [facility] PACEMAKER REMOTE [facility]   11/1/2024  3:15 PM Jorge Luis Vogel MD [facility]   11/21/2024 11:00 AM [facility] PACEMAKER IN CLINIC [facility]

## 2024-08-17 DIAGNOSIS — G30.1 MILD LATE ONSET ALZHEIMER'S DEMENTIA WITHOUT BEHAVIORAL DISTURBANCE, PSYCHOTIC DISTURBANCE, MOOD DISTURBANCE, OR ANXIETY (MULTI): ICD-10-CM

## 2024-08-17 DIAGNOSIS — F02.A0 MILD LATE ONSET ALZHEIMER'S DEMENTIA WITHOUT BEHAVIORAL DISTURBANCE, PSYCHOTIC DISTURBANCE, MOOD DISTURBANCE, OR ANXIETY (MULTI): ICD-10-CM

## 2024-08-19 DIAGNOSIS — G30.1 MILD LATE ONSET ALZHEIMER'S DEMENTIA WITHOUT BEHAVIORAL DISTURBANCE, PSYCHOTIC DISTURBANCE, MOOD DISTURBANCE, OR ANXIETY (MULTI): ICD-10-CM

## 2024-08-19 DIAGNOSIS — F02.A0 MILD LATE ONSET ALZHEIMER'S DEMENTIA WITHOUT BEHAVIORAL DISTURBANCE, PSYCHOTIC DISTURBANCE, MOOD DISTURBANCE, OR ANXIETY (MULTI): ICD-10-CM

## 2024-08-19 RX ORDER — DONEPEZIL HYDROCHLORIDE 5 MG/1
5 TABLET, FILM COATED ORAL NIGHTLY
Qty: 90 TABLET | Refills: 1 | OUTPATIENT
Start: 2024-08-19 | End: 2025-02-15

## 2024-08-19 RX ORDER — DONEPEZIL HYDROCHLORIDE 10 MG/1
10 TABLET, FILM COATED ORAL NIGHTLY
Qty: 90 TABLET | Refills: 1 | Status: SHIPPED | OUTPATIENT
Start: 2024-08-19 | End: 2025-02-15

## 2024-08-20 RX ORDER — DONEPEZIL HYDROCHLORIDE 5 MG/1
5 TABLET, FILM COATED ORAL NIGHTLY
Qty: 90 TABLET | Refills: 1 | OUTPATIENT
Start: 2024-08-20 | End: 2025-02-16

## 2024-08-22 ENCOUNTER — HOSPITAL ENCOUNTER (OUTPATIENT)
Dept: CARDIOLOGY | Age: 84
Discharge: HOME | End: 2024-08-22
Payer: MEDICARE

## 2024-08-22 PROCEDURE — 93296 REM INTERROG EVL PM/IDS: CPT

## 2024-08-29 ENCOUNTER — APPOINTMENT (OUTPATIENT)
Dept: PRIMARY CARE | Facility: CLINIC | Age: 84
End: 2024-08-29
Payer: MEDICARE

## 2024-08-29 VITALS
OXYGEN SATURATION: 96 % | SYSTOLIC BLOOD PRESSURE: 134 MMHG | BODY MASS INDEX: 27.61 KG/M2 | HEIGHT: 57 IN | HEART RATE: 88 BPM | WEIGHT: 128 LBS | DIASTOLIC BLOOD PRESSURE: 67 MMHG

## 2024-08-29 DIAGNOSIS — D51.9 ANEMIA DUE TO VITAMIN B12 DEFICIENCY, UNSPECIFIED B12 DEFICIENCY TYPE: ICD-10-CM

## 2024-08-29 DIAGNOSIS — E55.9 VITAMIN D DEFICIENCY: ICD-10-CM

## 2024-08-29 DIAGNOSIS — R40.0 SOMNOLENCE: Primary | ICD-10-CM

## 2024-08-29 DIAGNOSIS — G30.1 MILD LATE ONSET ALZHEIMER'S DEMENTIA WITHOUT BEHAVIORAL DISTURBANCE, PSYCHOTIC DISTURBANCE, MOOD DISTURBANCE, OR ANXIETY (MULTI): ICD-10-CM

## 2024-08-29 DIAGNOSIS — F02.A0 MILD LATE ONSET ALZHEIMER'S DEMENTIA WITHOUT BEHAVIORAL DISTURBANCE, PSYCHOTIC DISTURBANCE, MOOD DISTURBANCE, OR ANXIETY (MULTI): ICD-10-CM

## 2024-08-29 DIAGNOSIS — Z71.89 CARDIAC RISK COUNSELING: ICD-10-CM

## 2024-08-29 DIAGNOSIS — E11.65 TYPE 2 DIABETES MELLITUS WITH HYPERGLYCEMIA, WITHOUT LONG-TERM CURRENT USE OF INSULIN (MULTI): ICD-10-CM

## 2024-08-29 DIAGNOSIS — L82.1 SEBORRHEIC KERATOSIS: ICD-10-CM

## 2024-08-29 DIAGNOSIS — I10 ESSENTIAL HYPERTENSION: ICD-10-CM

## 2024-08-29 DIAGNOSIS — N39.0 E. COLI UTI: ICD-10-CM

## 2024-08-29 DIAGNOSIS — E78.00 PURE HYPERCHOLESTEROLEMIA WITH TARGET LOW DENSITY LIPOPROTEIN (LDL) CHOLESTEROL LESS THAN 70 MG/DL: ICD-10-CM

## 2024-08-29 DIAGNOSIS — F41.8 ANXIETY WITH DEPRESSION: ICD-10-CM

## 2024-08-29 DIAGNOSIS — B96.20 E. COLI UTI: ICD-10-CM

## 2024-08-29 DIAGNOSIS — R68.89 COLD INTOLERANCE: ICD-10-CM

## 2024-08-29 DIAGNOSIS — N39.41 URGE URINARY INCONTINENCE: ICD-10-CM

## 2024-08-29 PROCEDURE — 1123F ACP DISCUSS/DSCN MKR DOCD: CPT | Performed by: INTERNAL MEDICINE

## 2024-08-29 PROCEDURE — 3078F DIAST BP <80 MM HG: CPT | Performed by: INTERNAL MEDICINE

## 2024-08-29 PROCEDURE — 1159F MED LIST DOCD IN RCRD: CPT | Performed by: INTERNAL MEDICINE

## 2024-08-29 PROCEDURE — 99214 OFFICE O/P EST MOD 30 MIN: CPT | Performed by: INTERNAL MEDICINE

## 2024-08-29 PROCEDURE — 3075F SYST BP GE 130 - 139MM HG: CPT | Performed by: INTERNAL MEDICINE

## 2024-08-29 NOTE — PROGRESS NOTES
"Subjective   Patient ID: Marci Han is a 84 y.o. female who presents for Follow-up (Patient presented today for a 2 month follow up./).    HPI   Here somewhat earlier than expected, with daughter pointing out that patient is \"always cold, tired, sleeping all day.\"  The patient herself does not have any particular complaints, but does say that she has to go to the bathroom a lot.  She has also lately been having URGE INCONTINENCE, and the daughter was wondering if she could avail of some diapers/pads.    Otherwise, the patient states no headache, no blurred vision, no diplopia.  No dysphagia.  No particular focal weakness.  No recent falls.  She herself is not worried about her memory.  No delirium, no belligerence noted.  Still easily directable.    Otherwise, no particular cough, no particular sputum production.  No apparent chest pain.  Appetite baseline, no abdominal distress.  No particular skin changes, with the patient stating that she wants a lotion to apply to the blemishes on her face, \"because they make me look old.\"  ENDOCRINE with no polyuria, polydipsia, polyphagia.  No blurred vision.  No skin, hair, nail changes.  No dramatic weight loss or weight gain.  CONSTITUTIONALLY, no fever, no chills.  No night sweats.  No lingering anorexia or nausea.  No apparent lymphadenopathy.  No apparent weight loss.        Review of Systems  Review of systems as in history of present illness, and otherwise, reviewed separately as well, and was unremarkable/negative/noncontributory.        Objective   /67 (BP Location: Left arm, Patient Position: Sitting, BP Cuff Size: Adult)   Pulse 88   Ht 1.448 m (4' 9\")   Wt 58.1 kg (128 lb)   SpO2 96%   BMI 27.70 kg/m²     Physical Exam  In otherwise seemingly good spirits.  Perhaps a little tired, and answers in short sentences, but otherwise, receptive, oriented to person and place.  Seems to want to improve quality of life, and does not wish harm to self or " others.  Moderately built.  Not unkempt.  Seemingly appropriate.    HEAD pink palpebral conjunctivae, anicteric sclerae.  Mucous membranes moist.  No tonsillopharyngeal congestion, no thrush.  NECK supple, no apparent jugular venous distention.  CARDIOVASCULAR not in distress or diaphoresis.  No bipedal edema.  LUNGS not in distress or diaphoresis.  Not using accessory muscles.  Clear to auscultation bilaterally.  ABDOMEN soft, with minimal EPIGASTRIC, no rebound tenderness.  No suprapubic tenderness.  BACK no costovertebral angle tenderness.  EXTREMITIES no clubbing, no cyanosis.  NEURO no facial asymmetry.  No apparent cranial nerve deficits.  Romberg negative.  Ambulating without need of assistance.  No apparent focal weakness.  No tremors.  PSYCH receptive, appropriate, and eager to maintain and improve quality of life.        LABORATORY results reviewed with patient and daughter.        Assessment/Plan   Marci was seen today for follow-up.  Diagnoses and all orders for this visit:  Somnolence (Primary)  -     Comprehensive Metabolic Panel; Future  -     TSH with reflex to Free T4 if abnormal; Future  -     Urinalysis with Reflex Culture and Microscopic; Future  -     Folate; Future  -     Vitamin B12; Future  Cold intolerance  -     TSH with reflex to Free T4 if abnormal; Future  -     Urinalysis with Reflex Culture and Microscopic; Future  Urge urinary incontinence  -     CBC and Auto Differential; Future  -     Urinalysis with Reflex Culture and Microscopic; Future  Anemia due to vitamin B12 deficiency, unspecified B12 deficiency type  -     CBC and Auto Differential; Future  -     Urinalysis with Reflex Culture and Microscopic; Future  -     Folate; Future  -     Ferritin; Future  -     Iron and TIBC; Future  -     Vitamin B12; Future  Mild late onset Alzheimer's dementia without behavioral disturbance, psychotic disturbance, mood disturbance, or anxiety (Multi)  -     Comprehensive Metabolic Panel;  Future  -     TSH with reflex to Free T4 if abnormal; Future  -     Urinalysis with Reflex Culture and Microscopic; Future  Type 2 diabetes mellitus with hyperglycemia, without long-term current use of insulin (Multi)  -     Comprehensive Metabolic Panel; Future  -     Urinalysis with Reflex Culture and Microscopic; Future  -     Hemoglobin A1C; Future  -     Albumin-Creatinine Ratio, Urine Random; Future  Essential hypertension  -     CBC and Auto Differential; Future  -     Comprehensive Metabolic Panel; Future  -     TSH with reflex to Free T4 if abnormal; Future  -     Magnesium; Future  -     Urinalysis with Reflex Culture and Microscopic; Future  Pure hypercholesterolemia with target low density lipoprotein (LDL) cholesterol less than 70 mg/dL  -     Comprehensive Metabolic Panel; Future  -     Lipid Panel; Future  Cardiac risk counseling  -     Comprehensive Metabolic Panel; Future  -     Hemoglobin A1C; Future  -     Lipid Panel; Future  Vitamin D deficiency  -     Comprehensive Metabolic Panel; Future  -     Vitamin D 25-Hydroxy,Total (for eval of Vitamin D levels); Future  Anxiety with depression  -     Comprehensive Metabolic Panel; Future  -     TSH with reflex to Free T4 if abnormal; Future  -     Urinalysis with Reflex Culture and Microscopic; Future  -     Folate; Future  -     Vitamin B12; Future  Seborrheic keratosis  -     CBC and Auto Differential; Future  E. coli UTI  -     CBC and Auto Differential; Future  -     Comprehensive Metabolic Panel; Future  -     Urinalysis with Reflex Culture and Microscopic; Future         Thank you very much for coming.  It is always nice to see you and your daughter!    I do understand that RECENTLY, YOU HAVE BEEN MORE TIRED, and perhaps even more sleepy.  You have also been feeling COLD.  You have also had some URINE URGENCY.  You have also had some persistence of MILD ANEMIA.    Indeed, all of these could be related.  I am glad that you have not done your  laboratory examinations yet.  Go ahead and make sure that your next blood and urine examinations are in a FASTING state, so that we can check everything together.    I will call with results and possible changes.    As far as the PAROXETINE/PAXIL is concerned, please continue taking HALF TABLET with SUPPER every evening.  After I see the laboratory results, I will call back and let you know of any further changes that hopefully can improve your mood and energy!    DONEPEZIL/Aricept is also a good medication for your mood and memory, but it may be contributing to your sleepiness as well.  Again, let me call you with further results and possible changes.  We may need to cut this medication in half as well, and we might even add another medication to perk up your mood and memory.    If these adjustments do not work, then you will benefit from seeing a NEUROLOGIST.  First, let us check your FASTING BLOOD and urine.    Yes, I will try and order you some DEPENDS.  Your insurance may cover it with the proper paperwork and diagnosis.    Again, thank you very much for coming.  Please keep your next appointment in SEPTEMBER, but we may need to change this depending on your laboratory results.  Until then, please do fasting laboratory examinations soon, and I will send word regarding results and possible changes.  Please continue to take care of yourself and each other, and please continue to pray for our recovery from this pandemic.  Take care and God bless us all!    Please do your PNEUMONIA vaccination as soon as possible care of your local friendly pharmacy.  PREVNAR 20 is highly recommended and will be paid for by Medicare.  This will be good for your whole lifetime.  Please get this done soon.    After about 2 weeks, get yourself vaccinated for INFLUENZA.  Do not take more than 1 vaccination per visit to the pharmacy please.  Space out your vaccinations at least 2 weeks apart so that you can get the most robust  response!            0  Keep next appointment, or may change depending on laboratory results.  Reassess mood, energy, function, cognition, response to psychoactive regimens, possibly coordinate with neurology.  Rule out caregiver stress of daughter.  Reassess with podiatry, ophthalmology, dermatology, cardiology/EP, endocrinology.  Consider neurology.            0

## 2024-08-29 NOTE — PATIENT INSTRUCTIONS
Thank you very much for coming.  It is always nice to see you and your daughter!    I do understand that RECENTLY, YOU HAVE BEEN MORE TIRED, and perhaps even more sleepy.  You have also been feeling COLD.  You have also had some URINE URGENCY.  You have also had some persistence of MILD ANEMIA.    Indeed, all of these could be related.  I am glad that you have not done your laboratory examinations yet.  Go ahead and make sure that your next blood and urine examinations are in a FASTING state, so that we can check everything together.    I will call with results and possible changes.    As far as the PAROXETINE/PAXIL is concerned, please continue taking HALF TABLET with SUPPER every evening.  After I see the laboratory results, I will call back and let you know of any further changes that hopefully can improve your mood and energy!    DONEPEZIL/Aricept is also a good medication for your mood and memory, but it may be contributing to your sleepiness as well.  Again, let me call you with further results and possible changes.  We may need to cut this medication in half as well, and we might even add another medication to perk up your mood and memory.    If these adjustments do not work, then you will benefit from seeing a NEUROLOGIST.  First, let us check your FASTING BLOOD and urine.    Yes, I will try and order you some DEPENDS.  Your insurance may cover it with the proper paperwork and diagnosis.    Again, thank you very much for coming.  Please keep your next appointment in SEPTEMBER, but we may need to change this depending on your laboratory results.  Until then, please do fasting laboratory examinations soon, and I will send word regarding results and possible changes.  Please continue to take care of yourself and each other, and please continue to pray for our recovery from this pandemic.  Take care and God bless us all!    Please do your PNEUMONIA vaccination as soon as possible care of your local friendly  pharmacy.  PREVNAR 20 is highly recommended and will be paid for by Medicare.  This will be good for your whole lifetime.  Please get this done soon.    After about 2 weeks, get yourself vaccinated for INFLUENZA.  Do not take more than 1 vaccination per visit to the pharmacy please.  Space out your vaccinations at least 2 weeks apart so that you can get the most robust response!            0  Keep next appointment, or may change depending on laboratory results.  Reassess mood, energy, function, cognition, response to psychoactive regimens, possibly coordinate with neurology.  Rule out caregiver stress of daughter.  Reassess with podiatry, ophthalmology, dermatology, cardiology/EP, endocrinology.  Consider neurology.            0

## 2024-08-30 ENCOUNTER — LAB (OUTPATIENT)
Dept: LAB | Facility: LAB | Age: 84
End: 2024-08-30
Payer: MEDICARE

## 2024-08-30 DIAGNOSIS — B96.20 E. COLI UTI: ICD-10-CM

## 2024-08-30 DIAGNOSIS — L82.1 SEBORRHEIC KERATOSIS: ICD-10-CM

## 2024-08-30 DIAGNOSIS — N39.41 URGE URINARY INCONTINENCE: ICD-10-CM

## 2024-08-30 DIAGNOSIS — E78.00 PURE HYPERCHOLESTEROLEMIA WITH TARGET LOW DENSITY LIPOPROTEIN (LDL) CHOLESTEROL LESS THAN 70 MG/DL: ICD-10-CM

## 2024-08-30 DIAGNOSIS — D51.9 ANEMIA DUE TO VITAMIN B12 DEFICIENCY, UNSPECIFIED B12 DEFICIENCY TYPE: ICD-10-CM

## 2024-08-30 DIAGNOSIS — F41.8 ANXIETY WITH DEPRESSION: ICD-10-CM

## 2024-08-30 DIAGNOSIS — G30.1 MILD LATE ONSET ALZHEIMER'S DEMENTIA WITHOUT BEHAVIORAL DISTURBANCE, PSYCHOTIC DISTURBANCE, MOOD DISTURBANCE, OR ANXIETY (MULTI): ICD-10-CM

## 2024-08-30 DIAGNOSIS — Z71.89 CARDIAC RISK COUNSELING: ICD-10-CM

## 2024-08-30 DIAGNOSIS — E55.9 VITAMIN D DEFICIENCY: ICD-10-CM

## 2024-08-30 DIAGNOSIS — R68.89 COLD INTOLERANCE: ICD-10-CM

## 2024-08-30 DIAGNOSIS — I10 ESSENTIAL HYPERTENSION: ICD-10-CM

## 2024-08-30 DIAGNOSIS — N39.0 E. COLI UTI: ICD-10-CM

## 2024-08-30 DIAGNOSIS — R40.0 SOMNOLENCE: ICD-10-CM

## 2024-08-30 DIAGNOSIS — F02.A0 MILD LATE ONSET ALZHEIMER'S DEMENTIA WITHOUT BEHAVIORAL DISTURBANCE, PSYCHOTIC DISTURBANCE, MOOD DISTURBANCE, OR ANXIETY (MULTI): ICD-10-CM

## 2024-08-30 DIAGNOSIS — E11.65 TYPE 2 DIABETES MELLITUS WITH HYPERGLYCEMIA, WITHOUT LONG-TERM CURRENT USE OF INSULIN (MULTI): ICD-10-CM

## 2024-08-30 DIAGNOSIS — R60.0 BILATERAL EDEMA OF LOWER EXTREMITY: ICD-10-CM

## 2024-08-30 LAB
25(OH)D3 SERPL-MCNC: 43 NG/ML (ref 30–100)
ALBUMIN SERPL BCP-MCNC: 3.8 G/DL (ref 3.4–5)
ALP SERPL-CCNC: 77 U/L (ref 33–136)
ALT SERPL W P-5'-P-CCNC: 26 U/L (ref 7–45)
ANION GAP SERPL CALC-SCNC: 12 MMOL/L (ref 10–20)
APPEARANCE UR: CLEAR
AST SERPL W P-5'-P-CCNC: 35 U/L (ref 9–39)
BACTERIA #/AREA URNS AUTO: ABNORMAL /HPF
BASOPHILS # BLD AUTO: 0.03 X10*3/UL (ref 0–0.1)
BASOPHILS NFR BLD AUTO: 0.5 %
BILIRUB SERPL-MCNC: 0.5 MG/DL (ref 0–1.2)
BILIRUB UR STRIP.AUTO-MCNC: NEGATIVE MG/DL
BUN SERPL-MCNC: 20 MG/DL (ref 6–23)
CALCIUM SERPL-MCNC: 9.6 MG/DL (ref 8.6–10.3)
CHLORIDE SERPL-SCNC: 103 MMOL/L (ref 98–107)
CHOLEST SERPL-MCNC: 89 MG/DL (ref 0–199)
CHOLESTEROL/HDL RATIO: 1.9
CO2 SERPL-SCNC: 30 MMOL/L (ref 21–32)
COLOR UR: ABNORMAL
CREAT SERPL-MCNC: 0.82 MG/DL (ref 0.5–1.05)
CREAT UR-MCNC: 100 MG/DL (ref 20–320)
EGFRCR SERPLBLD CKD-EPI 2021: 71 ML/MIN/1.73M*2
EOSINOPHIL # BLD AUTO: 0.15 X10*3/UL (ref 0–0.4)
EOSINOPHIL NFR BLD AUTO: 2.6 %
ERYTHROCYTE [DISTWIDTH] IN BLOOD BY AUTOMATED COUNT: 14.3 % (ref 11.5–14.5)
FERRITIN SERPL-MCNC: 44 NG/ML (ref 8–150)
FOLATE SERPL-MCNC: >22.3 NG/ML
GLUCOSE SERPL-MCNC: 146 MG/DL (ref 74–99)
GLUCOSE UR STRIP.AUTO-MCNC: NORMAL MG/DL
HCT VFR BLD AUTO: 39.4 % (ref 36–46)
HDLC SERPL-MCNC: 45.8 MG/DL
HGB BLD-MCNC: 11.9 G/DL (ref 12–16)
HOLD SPECIMEN: NORMAL
IMM GRANULOCYTES # BLD AUTO: 0.01 X10*3/UL (ref 0–0.5)
IMM GRANULOCYTES NFR BLD AUTO: 0.2 % (ref 0–0.9)
IRON SATN MFR SERPL: 24 % (ref 25–45)
IRON SERPL-MCNC: 81 UG/DL (ref 35–150)
KETONES UR STRIP.AUTO-MCNC: NEGATIVE MG/DL
LDLC SERPL CALC-MCNC: 28 MG/DL
LEUKOCYTE ESTERASE UR QL STRIP.AUTO: ABNORMAL
LYMPHOCYTES # BLD AUTO: 1.48 X10*3/UL (ref 0.8–3)
LYMPHOCYTES NFR BLD AUTO: 25.8 %
MAGNESIUM SERPL-MCNC: 1.59 MG/DL (ref 1.6–2.4)
MCH RBC QN AUTO: 30.5 PG (ref 26–34)
MCHC RBC AUTO-ENTMCNC: 30.2 G/DL (ref 32–36)
MCV RBC AUTO: 101 FL (ref 80–100)
MICROALBUMIN UR-MCNC: 9.9 MG/L
MICROALBUMIN/CREAT UR: 9.9 UG/MG CREAT
MONOCYTES # BLD AUTO: 0.33 X10*3/UL (ref 0.05–0.8)
MONOCYTES NFR BLD AUTO: 5.8 %
NEUTROPHILS # BLD AUTO: 3.73 X10*3/UL (ref 1.6–5.5)
NEUTROPHILS NFR BLD AUTO: 65.1 %
NITRITE UR QL STRIP.AUTO: NEGATIVE
NON HDL CHOLESTEROL: 43 MG/DL (ref 0–149)
NRBC BLD-RTO: 0 /100 WBCS (ref 0–0)
PH UR STRIP.AUTO: 5 [PH]
PLATELET # BLD AUTO: 187 X10*3/UL (ref 150–450)
POTASSIUM SERPL-SCNC: 5.1 MMOL/L (ref 3.5–5.3)
PROT SERPL-MCNC: 6.6 G/DL (ref 6.4–8.2)
PROT UR STRIP.AUTO-MCNC: NEGATIVE MG/DL
RBC # BLD AUTO: 3.9 X10*6/UL (ref 4–5.2)
RBC # UR STRIP.AUTO: NEGATIVE /UL
RBC #/AREA URNS AUTO: ABNORMAL /HPF
SODIUM SERPL-SCNC: 140 MMOL/L (ref 136–145)
SP GR UR STRIP.AUTO: 1.02
SQUAMOUS #/AREA URNS AUTO: ABNORMAL /HPF
TIBC SERPL-MCNC: 341 UG/DL (ref 240–445)
TRIGL SERPL-MCNC: 75 MG/DL (ref 0–149)
TSH SERPL-ACNC: 2.05 MIU/L (ref 0.44–3.98)
UIBC SERPL-MCNC: 260 UG/DL (ref 110–370)
UROBILINOGEN UR STRIP.AUTO-MCNC: NORMAL MG/DL
VIT B12 SERPL-MCNC: 937 PG/ML (ref 211–911)
VLDL: 15 MG/DL (ref 0–40)
WBC # BLD AUTO: 5.7 X10*3/UL (ref 4.4–11.3)
WBC #/AREA URNS AUTO: ABNORMAL /HPF

## 2024-08-30 PROCEDURE — 83540 ASSAY OF IRON: CPT

## 2024-08-30 PROCEDURE — 82570 ASSAY OF URINE CREATININE: CPT

## 2024-08-30 PROCEDURE — 82607 VITAMIN B-12: CPT

## 2024-08-30 PROCEDURE — 82728 ASSAY OF FERRITIN: CPT

## 2024-08-30 PROCEDURE — 85025 COMPLETE CBC W/AUTO DIFF WBC: CPT

## 2024-08-30 PROCEDURE — 82306 VITAMIN D 25 HYDROXY: CPT

## 2024-08-30 PROCEDURE — 36415 COLL VENOUS BLD VENIPUNCTURE: CPT

## 2024-08-30 PROCEDURE — 83550 IRON BINDING TEST: CPT

## 2024-08-30 PROCEDURE — 83036 HEMOGLOBIN GLYCOSYLATED A1C: CPT

## 2024-08-30 PROCEDURE — 82043 UR ALBUMIN QUANTITATIVE: CPT

## 2024-08-30 PROCEDURE — 82746 ASSAY OF FOLIC ACID SERUM: CPT

## 2024-08-30 PROCEDURE — 81001 URINALYSIS AUTO W/SCOPE: CPT

## 2024-08-30 PROCEDURE — 80061 LIPID PANEL: CPT

## 2024-08-30 PROCEDURE — 84443 ASSAY THYROID STIM HORMONE: CPT

## 2024-08-30 PROCEDURE — 87086 URINE CULTURE/COLONY COUNT: CPT

## 2024-08-30 PROCEDURE — 80053 COMPREHEN METABOLIC PANEL: CPT

## 2024-08-30 PROCEDURE — 83735 ASSAY OF MAGNESIUM: CPT

## 2024-08-31 LAB
EST. AVERAGE GLUCOSE BLD GHB EST-MCNC: 203 MG/DL
HBA1C MFR BLD: 8.7 %

## 2024-09-01 LAB — BACTERIA UR CULT: ABNORMAL

## 2024-09-01 RX ORDER — CIPROFLOXACIN 500 MG/1
500 TABLET ORAL 2 TIMES DAILY
Qty: 6 TABLET | Refills: 0 | Status: SHIPPED | OUTPATIENT
Start: 2024-09-01 | End: 2024-09-04

## 2024-09-06 ENCOUNTER — HOSPITAL ENCOUNTER (OUTPATIENT)
Dept: CARDIOLOGY | Facility: CLINIC | Age: 84
Discharge: HOME | End: 2024-09-06
Payer: MEDICARE

## 2024-09-06 VITALS — BODY MASS INDEX: 27.61 KG/M2 | WEIGHT: 128 LBS | HEIGHT: 57 IN

## 2024-09-06 DIAGNOSIS — I34.2 NONRHEUMATIC MITRAL VALVE STENOSIS: ICD-10-CM

## 2024-09-06 PROCEDURE — 93306 TTE W/DOPPLER COMPLETE: CPT

## 2024-09-06 PROCEDURE — 93306 TTE W/DOPPLER COMPLETE: CPT | Performed by: INTERNAL MEDICINE

## 2024-09-07 LAB
AORTIC VALVE MEAN GRADIENT: 8 MMHG
AORTIC VALVE PEAK VELOCITY: 1.95 M/S
AV PEAK GRADIENT: 15.2 MMHG
AVA (PEAK VEL): 1.14 CM2
AVA (VTI): 1.29 CM2
EJECTION FRACTION APICAL 4 CHAMBER: 50.8
EJECTION FRACTION: 55 %
LEFT VENTRICLE INTERNAL DIMENSION DIASTOLE: 3.8 CM (ref 3.5–6)
LEFT VENTRICULAR OUTFLOW TRACT DIAMETER: 1.5 CM
LV EJECTION FRACTION BIPLANE: 55 %
MITRAL VALVE E/A RATIO: 0.86
MITRAL VALVE E/E' RATIO: 43.5
RIGHT VENTRICLE PEAK SYSTOLIC PRESSURE: 43.4 MMHG

## 2024-09-12 ENCOUNTER — APPOINTMENT (OUTPATIENT)
Dept: PRIMARY CARE | Facility: CLINIC | Age: 84
End: 2024-09-12
Payer: MEDICARE

## 2024-09-12 ENCOUNTER — TELEPHONE (OUTPATIENT)
Dept: PRIMARY CARE | Facility: CLINIC | Age: 84
End: 2024-09-12

## 2024-09-12 VITALS
SYSTOLIC BLOOD PRESSURE: 113 MMHG | OXYGEN SATURATION: 96 % | HEIGHT: 57 IN | WEIGHT: 129.9 LBS | BODY MASS INDEX: 28.02 KG/M2 | HEART RATE: 68 BPM | DIASTOLIC BLOOD PRESSURE: 53 MMHG

## 2024-09-12 DIAGNOSIS — E55.9 VITAMIN D DEFICIENCY: ICD-10-CM

## 2024-09-12 DIAGNOSIS — I10 ESSENTIAL HYPERTENSION: ICD-10-CM

## 2024-09-12 DIAGNOSIS — R01.1 HEART MURMUR, SYSTOLIC: ICD-10-CM

## 2024-09-12 DIAGNOSIS — Z53.20 COLON CANCER SCREENING DECLINED: ICD-10-CM

## 2024-09-12 DIAGNOSIS — F02.A0 MILD LATE ONSET ALZHEIMER'S DEMENTIA WITHOUT BEHAVIORAL DISTURBANCE, PSYCHOTIC DISTURBANCE, MOOD DISTURBANCE, OR ANXIETY (MULTI): Primary | ICD-10-CM

## 2024-09-12 DIAGNOSIS — E11.65 TYPE 2 DIABETES MELLITUS WITH HYPERGLYCEMIA, WITHOUT LONG-TERM CURRENT USE OF INSULIN (MULTI): ICD-10-CM

## 2024-09-12 DIAGNOSIS — R60.0 BILATERAL EDEMA OF LOWER EXTREMITY: ICD-10-CM

## 2024-09-12 DIAGNOSIS — Z71.89 CARDIAC RISK COUNSELING: ICD-10-CM

## 2024-09-12 DIAGNOSIS — E78.00 PURE HYPERCHOLESTEROLEMIA WITH TARGET LOW DENSITY LIPOPROTEIN (LDL) CHOLESTEROL LESS THAN 70 MG/DL: ICD-10-CM

## 2024-09-12 DIAGNOSIS — G30.1 MILD LATE ONSET ALZHEIMER'S DEMENTIA WITHOUT BEHAVIORAL DISTURBANCE, PSYCHOTIC DISTURBANCE, MOOD DISTURBANCE, OR ANXIETY (MULTI): Primary | ICD-10-CM

## 2024-09-12 DIAGNOSIS — F41.8 ANXIETY WITH DEPRESSION: ICD-10-CM

## 2024-09-12 DIAGNOSIS — D51.9 ANEMIA DUE TO VITAMIN B12 DEFICIENCY, UNSPECIFIED B12 DEFICIENCY TYPE: ICD-10-CM

## 2024-09-12 PROCEDURE — 3074F SYST BP LT 130 MM HG: CPT | Performed by: INTERNAL MEDICINE

## 2024-09-12 PROCEDURE — 3078F DIAST BP <80 MM HG: CPT | Performed by: INTERNAL MEDICINE

## 2024-09-12 PROCEDURE — 1036F TOBACCO NON-USER: CPT | Performed by: INTERNAL MEDICINE

## 2024-09-12 PROCEDURE — G0009 ADMIN PNEUMOCOCCAL VACCINE: HCPCS | Performed by: INTERNAL MEDICINE

## 2024-09-12 PROCEDURE — 99214 OFFICE O/P EST MOD 30 MIN: CPT | Performed by: INTERNAL MEDICINE

## 2024-09-12 PROCEDURE — 1159F MED LIST DOCD IN RCRD: CPT | Performed by: INTERNAL MEDICINE

## 2024-09-12 PROCEDURE — 90677 PCV20 VACCINE IM: CPT | Performed by: INTERNAL MEDICINE

## 2024-09-12 PROCEDURE — 1123F ACP DISCUSS/DSCN MKR DOCD: CPT | Performed by: INTERNAL MEDICINE

## 2024-09-12 PROCEDURE — 1160F RVW MEDS BY RX/DR IN RCRD: CPT | Performed by: INTERNAL MEDICINE

## 2024-09-12 RX ORDER — PAROXETINE 10 MG/1
TABLET, FILM COATED ORAL
Status: SHIPPED | COMMUNITY
Start: 2024-09-12

## 2024-09-12 RX ORDER — SITAGLIPTIN AND METFORMIN HYDROCHLORIDE 50; 500 MG/1; MG/1
TABLET, FILM COATED, EXTENDED RELEASE ORAL
Qty: 90 TABLET | Refills: 0 | Status: SHIPPED | OUTPATIENT
Start: 2024-09-12

## 2024-09-12 NOTE — PATIENT INSTRUCTIONS
Thank you very much for coming.  I am very happy to see you again.    Thank you for taking your PAROXETINE 10 mg HALF TABLET with SUPPER every evening.  Thank you for taking your DONEPEZIL/Aricept 10 mg by mouth at bedtime.  I am glad to hear that you are feeling better, except that your continue to feel cold all the time!  You will benefit from seeing a NEUROLOGIST sometime soon.    When I see you in 3 months, if you need more help, we can add another medication called MEMANTINE.  For now, you seem to be doing better with your current regimens.    Also, we did your FASTING laboratory examinations, and your VITAMIN B12 is good.  Please continue taking CYANOCOBALAMIN, good for blood count, and good for memory!    In the meantime, your marker for diabetes is very high at 8.7.  Please continue taking your GLIMEPIRIDE and PIOGLITAZONE.  Please check your blood sugar in the morning before breakfast, especially if you are not feeling well.  Please let me know if the reading is 85 or lower, or if the reading is 275 or higher.  I can make adjustments over the telephone until I see you again.    You will also benefit from the addition of JANUMET.  Let us see if your insurance will pay for this medication.  Take 1 tablet by mouth with LUNCH every day.    Please come back in 3 months, so that we can see if your medications are working!  Do some FASTING laboratory examinations a few days prior, then see me soon after.    Please continue seeing your HEART SPECIALIST.  You are doing very well.  The ultrasound of your heart showed good heart function.    Today, you will benefit from a PNEUMONIA vaccination, Prevnar 20.  It is highly recommended by Medicare, and will be paid for by your insurance.  It is good for your whole lifetime!    After 2 weeks, please get yourself vaccinated for INFLUENZA with high dose influenza vaccination from your local friendly pharmacy.  Keep your vaccinations at least 2 weeks apart, so that you can  get the best response.    Again, thank you very much for coming.  See you in 3 months.  Call sooner with questions or concerns.  Please continue to take care of yourself and your family and each other, and please continue to pray for our recovery from this pandemic.  Take care and God bless.            0  Return in 3 months.  Fasting laboratory examination, then see me soon after.  Reassess compliance, tolerance, cognition, possibly coordinate with neurology.  Reassess blood sugar control, consider referral to endocrinology.  Consider discontinuation of pioglitazone if possible.  Patient does not want to do any needles.  Prepare for winter.  Review preventive strategies.  Patient does not want any colon check.  Rule out caregiver stress of daughter.            0

## 2024-09-12 NOTE — PROGRESS NOTES
Subjective   Patient ID: Marci Han is a 84 y.o. female who presents for Annual Exam (Complete Physical Exam).    HPI   Seemingly compliant with medications, tolerating regimens, including PAROXETINE 10 mg, ONLY HALF TABLET by mouth with SUPPER every evening, as well as DONEPEZIL 10 mg by mouth at bedtime.  States that she is less tired.  Jovial, and seemingly not worried about her memory.  No headache, blurred vision, diplopia.  No dysphagia.  No focal weakness, ataxia, clumsiness.  No falls.  No paresthesia.  No confusion or delirium, with patient not worried about memory.  Not depressive or suicidal, with patient not wishing harm to self or others.  Daughter in attendance, helping to keep her focused, and not seemingly suffering from caregiver stress.    Seemingly compliant with medications, tolerating regimens.  Eating sensibly.  Staying physically active.  States that she is stiff after sitting for prolonged periods of time, but otherwise, no headache, blurred vision, diplopia.  No dysphagia.  No focal weakness, no recent falls.  Manageable ataxia, not requiring any assistive device.  Continues to want to stay healthy, independent, and productive, and does not wish harm to self or others.    Seen by CARDIOLOGY, subjected to ECHOCARDIOGRAM.  In the meantime, some edema by the end of the day, dissipating after recumbency overnight.  No ashish substernal chest pain, no orthopnea, no paroxysmal nocturnal dyspnea.      No gum or nose bleeding.  No easy bruisability.  No apparent blood in urine or stool.  No lightheadedness upon sudden standing.  No dyspeptic symptoms.  Respectfully refusing to have a colonoscopy, and does not want to collect her stool sample either by Cologuard or fecal smear or fecal kit.  Aware of the choices.    No particular cough, no particular sputum production.  CONSTITUTIONALLY, no fever, no chills.  No night sweats.  No lingering anorexia or nausea.  No apparent lymphadenopathy.  No  "apparent weight loss.        Review of Systems  Review of systems as in history of present illness, and otherwise, reviewed separately as well, and was unremarkable/negative/noncontributory.        Objective   /53 (BP Location: Left arm, Patient Position: Sitting, BP Cuff Size: Adult)   Pulse 68   Ht 1.448 m (4' 9\")   Wt 58.9 kg (129 lb 14.4 oz)   SpO2 96%   BMI 28.11 kg/m²     Physical Exam  In good spirits.  Not in distress or diaphoresis.  Receptive, oriented to person and place.  Easily directable.  Continues to want to improve quality of life, and does not wish harm to self or others.  Moderately built.  Appropriate.    HEAD pink palpebral conjunctivae, anicteric sclerae.  Mucous membranes moist.  NECK supple, no apparent jugular venous distention.  CARDIOVASCULAR not in distress or diaphoresis.  No bipedal edema.  Soft systolic murmur noted.  LUNGS not in distress or diaphoresis.  Not using accessory muscles.  Clear to auscultation bilaterally.  ABDOMEN soft, nontender.  BACK no costovertebral angle tenderness.  EXTREMITIES no clubbing, no cyanosis.  NEURO no facial asymmetry.  No apparent cranial nerve deficits.  Romberg negative.  Ambulating without need of assistance.  No apparent focal weakness.  No tremors.  PSYCH receptive, appropriate, and eager to maintain and improve quality of life.        LABORATORY results reviewed, with cholesterol panel acceptable.  Hemoglobin A1c 8.7.  Preserved kidney and liver function.  Negative albumin in urine.  Urinalysis suggestive of UTI, but culture negative.  Magnesium 1.59, D43, B12 adequate, ferritin 40.  No anemia.        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 behavioral disturbance, psychotic disturbance, mood disturbance, or anxiety (Multi)  -     Follow Up In Primary Care - Established  -     Referral to Neurology; Future  -     Follow Up In Primary Care - Established; Future  -     " Pneumococcal conjugate vaccine, 20-valent (PREVNAR 20)  -     CBC and Auto Differential; Future  -     Comprehensive Metabolic Panel; Future  -     Magnesium; Future  -     Urinalysis with Reflex Microscopic; Future  -     TSH with reflex to Free T4 if abnormal; Future  -     Vitamin B12; Future  Type 2 diabetes mellitus with hyperglycemia, without long-term current use of insulin (Multi)  -     SITagliptin phos-metformin (Janumet XR)  mg tablet, ER multiphase 24 hr; Please take 1 tablet by mouth with LUNCH every day.  Thank you.  -     Follow Up In Primary Care - Established; Future  -     Pneumococcal conjugate vaccine, 20-valent (PREVNAR 20)  -     Comprehensive Metabolic Panel; Future  -     Urinalysis with Reflex Microscopic; Future  -     Hemoglobin A1C; Future  Essential hypertension  -     Follow Up In Primary Care - Established; Future  -     Pneumococcal conjugate vaccine, 20-valent (PREVNAR 20)  -     CBC and Auto Differential; Future  -     Comprehensive Metabolic Panel; Future  -     Magnesium; Future  -     Urinalysis with Reflex Microscopic; Future  Pure hypercholesterolemia with target low density lipoprotein (LDL) cholesterol less than 70 mg/dL  -     Follow Up In Primary Care - Established; Future  -     Pneumococcal conjugate vaccine, 20-valent (PREVNAR 20)  -     Comprehensive Metabolic Panel; Future  Cardiac risk counseling  -     Follow Up In Primary Care - Established; Future  -     Pneumococcal conjugate vaccine, 20-valent (PREVNAR 20)  -     Comprehensive Metabolic Panel; Future  -     Hemoglobin A1C; Future  Anemia due to vitamin B12 deficiency, unspecified B12 deficiency type  -     Follow Up In Primary Care - Established; Future  -     Pneumococcal conjugate vaccine, 20-valent (PREVNAR 20)  -     CBC and Auto Differential; Future  -     Vitamin B12; Future  Vitamin D deficiency  -     Follow Up In Primary Care - Established; Future  -     Pneumococcal conjugate vaccine, 20-valent  (PREVNAR 20)  -     Comprehensive Metabolic Panel; Future  Anxiety with depression  -     Follow Up In Primary Care - Established; Future  -     Pneumococcal conjugate vaccine, 20-valent (PREVNAR 20)  -     Comprehensive Metabolic Panel; Future  -     TSH with reflex to Free T4 if abnormal; Future  Colon cancer screening declined  -     Follow Up In Primary Care - Established; Future  -     Pneumococcal conjugate vaccine, 20-valent (PREVNAR 20)  -     CBC and Auto Differential; Future  Heart murmur, systolic  -     Follow Up In Primary Care - Established; Future  -     Pneumococcal conjugate vaccine, 20-valent (PREVNAR 20)  -     CBC and Auto Differential; Future  -     Comprehensive Metabolic Panel; Future  Bilateral edema of lower extremity  -     Follow Up In Primary Care - Established; Future  -     Pneumococcal conjugate vaccine, 20-valent (PREVNAR 20)  -     Comprehensive Metabolic Panel; Future       Thank you very much for coming.  I am very happy to see you again.    Thank you for taking your PAROXETINE 10 mg HALF TABLET with SUPPER every evening.  Thank you for taking your DONEPEZIL/Aricept 10 mg by mouth at bedtime.  I am glad to hear that you are feeling better, except that your continue to feel cold all the time!  You will benefit from seeing a NEUROLOGIST sometime soon.    When I see you in 3 months, if you need more help, we can add another medication called MEMANTINE.  For now, you seem to be doing better with your current regimens.    Also, we did your FASTING laboratory examinations, and your VITAMIN B12 is good.  Please continue taking CYANOCOBALAMIN, good for blood count, and good for memory!    In the meantime, your marker for diabetes is very high at 8.7.  Please continue taking your GLIMEPIRIDE and PIOGLITAZONE.  Please check your blood sugar in the morning before breakfast, especially if you are not feeling well.  Please let me know if the reading is 85 or lower, or if the reading is 275 or  higher.  I can make adjustments over the telephone until I see you again.    You will also benefit from the addition of JANUMET.  Let us see if your insurance will pay for this medication.  Take 1 tablet by mouth with LUNCH every day.    Please come back in 3 months, so that we can see if your medications are working!  Do some FASTING laboratory examinations a few days prior, then see me soon after.    Please continue seeing your HEART SPECIALIST.  You are doing very well.  The ultrasound of your heart showed good heart function.    Today, you will benefit from a PNEUMONIA vaccination, Prevnar 20.  It is highly recommended by Medicare, and will be paid for by your insurance.  It is good for your whole lifetime!    After 2 weeks, please get yourself vaccinated for INFLUENZA with high dose influenza vaccination from your local friendly pharmacy.  Keep your vaccinations at least 2 weeks apart, so that you can get the best response.    Again, thank you very much for coming.  See you in 3 months.  Call sooner with questions or concerns.  Please continue to take care of yourself and your family and each other, and please continue to pray for our recovery from this pandemic.  Take care and God bless.            0  Return in 3 months.  Fasting laboratory examination, then see me soon after.  Reassess compliance, tolerance, cognition, possibly coordinate with neurology.  Reassess blood sugar control, consider referral to endocrinology.  Consider discontinuation of pioglitazone if possible.  Patient does not want to do any needles.  Prepare for winter.  Review preventive strategies.  Patient does not want any colon check.  Rule out caregiver stress of daughter.            0

## 2024-09-13 ENCOUNTER — TELEPHONE (OUTPATIENT)
Dept: NEUROLOGY | Facility: CLINIC | Age: 84
End: 2024-09-13
Payer: MEDICARE

## 2024-09-19 ENCOUNTER — APPOINTMENT (OUTPATIENT)
Dept: CARDIOLOGY | Facility: CLINIC | Age: 84
End: 2024-09-19
Payer: MEDICARE

## 2024-09-19 VITALS
HEART RATE: 92 BPM | WEIGHT: 129.2 LBS | BODY MASS INDEX: 27.87 KG/M2 | DIASTOLIC BLOOD PRESSURE: 82 MMHG | HEIGHT: 57 IN | SYSTOLIC BLOOD PRESSURE: 140 MMHG

## 2024-09-19 DIAGNOSIS — I10 ESSENTIAL HYPERTENSION: ICD-10-CM

## 2024-09-19 DIAGNOSIS — Z78.9 NEVER SMOKED TOBACCO: ICD-10-CM

## 2024-09-19 DIAGNOSIS — I34.2 NONRHEUMATIC MITRAL VALVE STENOSIS: ICD-10-CM

## 2024-09-19 DIAGNOSIS — E11.51 TYPE 2 DIABETES MELLITUS WITH DIABETIC PERIPHERAL ANGIOPATHY WITHOUT GANGRENE, WITHOUT LONG-TERM CURRENT USE OF INSULIN (MULTI): ICD-10-CM

## 2024-09-19 DIAGNOSIS — E78.41 ELEVATED LIPOPROTEIN(A): ICD-10-CM

## 2024-09-19 DIAGNOSIS — Z95.0 PRESENCE OF CARDIAC PACEMAKER: ICD-10-CM

## 2024-09-19 DIAGNOSIS — I25.10 CAD S/P PERCUTANEOUS CORONARY ANGIOPLASTY: ICD-10-CM

## 2024-09-19 DIAGNOSIS — Z98.61 CAD S/P PERCUTANEOUS CORONARY ANGIOPLASTY: ICD-10-CM

## 2024-09-19 PROCEDURE — 3077F SYST BP >= 140 MM HG: CPT | Performed by: INTERNAL MEDICINE

## 2024-09-19 PROCEDURE — 3079F DIAST BP 80-89 MM HG: CPT | Performed by: INTERNAL MEDICINE

## 2024-09-19 PROCEDURE — 1123F ACP DISCUSS/DSCN MKR DOCD: CPT | Performed by: INTERNAL MEDICINE

## 2024-09-19 PROCEDURE — 99214 OFFICE O/P EST MOD 30 MIN: CPT | Performed by: INTERNAL MEDICINE

## 2024-09-19 PROCEDURE — 1036F TOBACCO NON-USER: CPT | Performed by: INTERNAL MEDICINE

## 2024-09-19 PROCEDURE — 1159F MED LIST DOCD IN RCRD: CPT | Performed by: INTERNAL MEDICINE

## 2024-09-19 NOTE — PATIENT INSTRUCTIONS
Patient to follow up in 9 months with Dr. Freddy Whitmore MD [facility]     No other changes today.   Continue same medications and treatments.   Patient educated on proper medication use.   Patient educated on risk factor modification.   Please bring any lab results from other providers / physicians to your next appointment.     Please bring all medicines, vitamins, and herbal supplements with you when you come to the office.     Prescriptions will not be filled unless you are compliant with your follow up appointments or have a follow up appointment scheduled as per instruction of your physician. Refills should be requested at the time of your visit.    Daron ROB RN am scribing for and in the presence of Dr. Freddy Whitmore MD [facility]

## 2024-09-19 NOTE — PROGRESS NOTES
Referred by Dr. Tran ref. provider found provider found for   Chief Complaint   Patient presents with    Follow-up     Pt is here for 4 month follow up and echo results        History of Present Illness  Marci Han is a 84 y.o. year old female patient here for follow-up.  Echocardiogram showed moderate mitral stenosis.  Those were discussed with the patient patient is asymptomatic at this point and she is at age does not want any intervention or surgery.  Will monitor her valve talked about watching for heart failure.  Will follow-up as scheduled    Past Medical History  Past Medical History:   Diagnosis Date    Encounter for general adult medical examination without abnormal findings     Health care maintenance    Personal history of diseases of the skin and subcutaneous tissue     History of eczema    Personal history of other diseases of the musculoskeletal system and connective tissue     History of bursitis    Personal history of other diseases of the nervous system and sense organs     History of eye pain    Personal history of other diseases of urinary system     History of hematuria    Personal history of other endocrine, nutritional and metabolic disease     History of vitamin D deficiency    Personal history of other infectious and parasitic diseases     History of candidiasis    Personal history of other specified conditions     History of chronic pain    Primary osteoarthritis, unspecified ankle and foot     Primary osteoarthritis of foot, unspecified laterality    Unspecified osteoarthritis, unspecified site 07/16/2019    Osteoarthritis    Urinary tract infection, site not specified     Bacterial UTI       Social History  Social History     Tobacco Use    Smoking status: Never    Smokeless tobacco: Never   Substance Use Topics    Alcohol use: Not Currently    Drug use: Never       Family History     Family History   Problem Relation Name Age of Onset    Diabetes Mother      Hypertension Mother       Diabetes Father      Hypertension Father      Heart attack Father         Review of Systems  As per HPI, all other systems reviewed and negative.    Allergies:  Allergies   Allergen Reactions    Iodinated Contrast Media Unknown    Lovastatin Unknown    Meloxicam Unknown    Naproxen Unknown    Pravastatin Unknown    Shellfish Containing Products Unknown    Shellfish Derived Hives        Outpatient Medications:  Current Outpatient Medications   Medication Instructions    aspirin 81 mg chewable tablet 1 tablet, oral, Daily    atorvastatin (Lipitor) 40 mg tablet 1 tablet, oral, Nightly    blood sugar diagnostic (Accu-Chek Lucila Plus test strp) strip Daily RT    cholecalciferol (Vitamin D-3) 25 MCG (1000 UT) tablet oral    cyanocobalamin (Vitamin B-12) 100 mcg tablet 1 tablet, oral, Daily    donepezil (ARICEPT) 10 mg, oral, Nightly    glimepiride (Amaryl) 2 mg tablet Please take 1 tablet by mouth before biggest meal of the day.  Do not take if you are not going to eat.  Thank you.    lancets misc miscellaneous, Test 1 time daily    lisinopril 10 mg tablet PLEASE TAKE ONLY 1 TABLET BY MOUTH EVERY DAY. THANK YOU.    metoprolol succinate XL (Toprol-XL) 50 mg 24 hr tablet 1 tablet, oral, Daily    multivitamin with minerals iron-free (Centrum Silver) 1 tablet, oral, Daily    PARoxetine (Paxil) 10 mg tablet Please take 1/2 tablet by mouth in the evening with SUPPER.  Thank you.    pioglitazone (ACTOS) 30 mg, oral, Daily    SITagliptin phos-metformin (Janumet XR)  mg tablet, ER multiphase 24 hr Please take 1 tablet by mouth with LUNCH every day.  Thank you.         Vitals:  Vitals:    09/19/24 1119   BP: 140/82   Pulse: 92       Physical Exam:  Physical Exam  Vitals and nursing note reviewed.   Constitutional:       Appearance: Normal appearance. She is normal weight.   HENT:      Head: Normocephalic and atraumatic.   Eyes:      Extraocular Movements: Extraocular movements intact.      Pupils: Pupils are equal, round, and  reactive to light.   Cardiovascular:      Rate and Rhythm: Normal rate and regular rhythm.      Pulses: Normal pulses.      Heart sounds: Murmur heard.   Pulmonary:      Effort: Pulmonary effort is normal.      Breath sounds: Normal breath sounds.   Musculoskeletal:      Cervical back: Normal range of motion.      Right lower leg: No edema.      Left lower leg: No edema.   Skin:     General: Skin is warm and dry.   Neurological:      General: No focal deficit present.      Mental Status: She is alert and oriented to person, place, and time.             Assessment/Plan   Diagnoses and all orders for this visit:  CAD S/P percutaneous coronary angioplasty  Essential hypertension  Elevated lipoprotein(a)  Nonrheumatic mitral valve stenosis  Presence of cardiac pacemaker  BMI 27.0-27.9,adult  Type 2 diabetes mellitus with diabetic peripheral angiopathy without gangrene, without long-term current use of insulin (Multi)  Never smoked tobacco          Freddy Whitmore MD [facility]  Interventional Cardiology   of [facility]     Thank you for allowing me to participate in the care of this patient. Please do not hesitate to contact me with any further questions or concerns.

## 2024-10-28 DIAGNOSIS — F02.A0 MILD LATE ONSET ALZHEIMER'S DEMENTIA WITHOUT BEHAVIORAL DISTURBANCE, PSYCHOTIC DISTURBANCE, MOOD DISTURBANCE, OR ANXIETY (MULTI): ICD-10-CM

## 2024-10-28 DIAGNOSIS — G30.1 MILD LATE ONSET ALZHEIMER'S DEMENTIA WITHOUT BEHAVIORAL DISTURBANCE, PSYCHOTIC DISTURBANCE, MOOD DISTURBANCE, OR ANXIETY (MULTI): ICD-10-CM

## 2024-10-28 DIAGNOSIS — F41.8 ANXIETY WITH DEPRESSION: ICD-10-CM

## 2024-10-28 DIAGNOSIS — E11.65 TYPE 2 DIABETES MELLITUS WITH HYPERGLYCEMIA, WITHOUT LONG-TERM CURRENT USE OF INSULIN: ICD-10-CM

## 2024-10-28 DIAGNOSIS — E11.9 DIABETES MELLITUS WITHOUT COMPLICATION (MULTI): ICD-10-CM

## 2024-10-28 DIAGNOSIS — E11.51 TYPE 2 DIABETES MELLITUS WITH DIABETIC PERIPHERAL ANGIOPATHY WITHOUT GANGRENE, WITHOUT LONG-TERM CURRENT USE OF INSULIN (MULTI): ICD-10-CM

## 2024-10-29 RX ORDER — GLIMEPIRIDE 2 MG/1
TABLET ORAL
Qty: 90 TABLET | Refills: 0 | Status: SHIPPED | OUTPATIENT
Start: 2024-10-29

## 2024-10-29 RX ORDER — PIOGLITAZONEHYDROCHLORIDE 30 MG/1
30 TABLET ORAL DAILY
Qty: 90 TABLET | Refills: 0 | Status: SHIPPED | OUTPATIENT
Start: 2024-10-29 | End: 2025-01-27

## 2024-10-29 RX ORDER — PAROXETINE 10 MG/1
TABLET, FILM COATED ORAL
Qty: 90 TABLET | Refills: 0 | Status: SHIPPED | OUTPATIENT
Start: 2024-10-29

## 2024-11-01 ENCOUNTER — OFFICE VISIT (OUTPATIENT)
Dept: CARDIOLOGY CLINIC | Age: 84
End: 2024-11-01

## 2024-11-01 VITALS
DIASTOLIC BLOOD PRESSURE: 66 MMHG | BODY MASS INDEX: 25.23 KG/M2 | OXYGEN SATURATION: 95 % | WEIGHT: 125 LBS | HEART RATE: 84 BPM | SYSTOLIC BLOOD PRESSURE: 116 MMHG

## 2024-11-01 DIAGNOSIS — Z95.0 PACEMAKER: ICD-10-CM

## 2024-11-01 DIAGNOSIS — I05.0 MITRAL VALVE STENOSIS, UNSPECIFIED ETIOLOGY: ICD-10-CM

## 2024-11-01 DIAGNOSIS — R42 DIZZINESS: Primary | ICD-10-CM

## 2024-11-01 DIAGNOSIS — I49.5 SSS (SICK SINUS SYNDROME) (HCC): ICD-10-CM

## 2024-11-01 DIAGNOSIS — I10 ESSENTIAL HYPERTENSION: ICD-10-CM

## 2024-11-01 DIAGNOSIS — I10 HYPERTENSION, UNSPECIFIED TYPE: ICD-10-CM

## 2024-11-01 DIAGNOSIS — E11.65 TYPE 2 DIABETES MELLITUS WITH HYPERGLYCEMIA, WITHOUT LONG-TERM CURRENT USE OF INSULIN: ICD-10-CM

## 2024-11-01 ASSESSMENT — ENCOUNTER SYMPTOMS
EYES NEGATIVE: 1
STRIDOR: 0
CHEST TIGHTNESS: 0
NAUSEA: 0
GASTROINTESTINAL NEGATIVE: 1
BLOOD IN STOOL: 0
COUGH: 0
WHEEZING: 0
SHORTNESS OF BREATH: 0
RESPIRATORY NEGATIVE: 1

## 2024-11-01 NOTE — PROGRESS NOTES
Subsequent Progress Note    Patient: Dolores Lopez  YOB: 1940  MRN: [id number]    Chief Complaint:  Chief Complaint   Patient presents with    Other     Pacemaker follow up       CV Data:  4/24 Echo EF 60-65 Severe MS Mean Gr 10 mild MR  4/15/24 Dual PPM for Severe Ludin.  MRI compatible  9/24 Echo EF 55. Mod severe MS    Subjective/HPI: recent PPM doing well site stable here w Breana, daughter.  Feels better    11/1/24 doing well no cp no sob no falls no bleed takes meds.  Suyapa Bañuelos and repeated Ech.  They elected conservative Rx. No surgery     EKG:    Live w Boyfriend  Non smoker    Past Medical History:   Diagnosis Date    Diabetes (HCC)     Dyspnea     Eczema     Fractures     Hyperlipidemia     Hypertension        Past Surgical History:   Procedure Laterality Date    EP DEVICE PROCEDURE N/A 4/15/2024    DUAL CHAMBER PACEMAKER. ROOM ICU-11 performed by Mg Moe DO at [facility] CARDIAC CATH LAB    HYSTERECTOMY, TOTAL ABDOMINAL (CERVIX REMOVED)         Family History   Problem Relation Age of Onset    Diabetes Mother     Hypertension Mother     Hypertension Father     Diabetes Father     Hypertension Sister     Diabetes Sister     Hypertension Brother     Diabetes Brother        Social History     Socioeconomic History    Marital status:    Tobacco Use    Smoking status: Never     Passive exposure: Never    Smokeless tobacco: Never   Vaping Use    Vaping status: Never Used   Substance and Sexual Activity    Alcohol use: Not Currently     Comment: occasional    Drug use: Never    Sexual activity: Not Currently     Partners: Male     Social Determinants of Health     Food Insecurity: No Food Insecurity (4/14/2024)    Hunger Vital Sign     Worried About Running Out of Food in the Last Year: Never true     Ran Out of Food in the Last Year: Never true   Transportation Needs: No Transportation Needs (4/14/2024)    PRAPARE - Transportation     Lack of Transportation (Medical): No     Lack of

## 2024-11-04 RX ORDER — LISINOPRIL 10 MG/1
TABLET ORAL
Qty: 90 TABLET | Refills: 0 | Status: SHIPPED | OUTPATIENT
Start: 2024-11-04

## 2024-12-05 ENCOUNTER — APPOINTMENT (OUTPATIENT)
Dept: PRIMARY CARE | Facility: CLINIC | Age: 84
End: 2024-12-05
Payer: MEDICARE

## 2024-12-31 ENCOUNTER — HOSPITAL ENCOUNTER (OUTPATIENT)
Dept: CARDIOLOGY | Age: 84
Discharge: HOME | End: 2024-12-31
Payer: MEDICARE

## 2024-12-31 PROCEDURE — 93296 REM INTERROG EVL PM/IDS: CPT

## 2025-01-06 ENCOUNTER — APPOINTMENT (OUTPATIENT)
Dept: PRIMARY CARE | Facility: CLINIC | Age: 85
End: 2025-01-06
Payer: MEDICARE

## 2025-01-06 VITALS
WEIGHT: 126.5 LBS | SYSTOLIC BLOOD PRESSURE: 130 MMHG | BODY MASS INDEX: 27.29 KG/M2 | OXYGEN SATURATION: 94 % | HEART RATE: 86 BPM | DIASTOLIC BLOOD PRESSURE: 60 MMHG | HEIGHT: 57 IN

## 2025-01-06 DIAGNOSIS — G30.1 MILD LATE ONSET ALZHEIMER'S DEMENTIA WITHOUT BEHAVIORAL DISTURBANCE, PSYCHOTIC DISTURBANCE, MOOD DISTURBANCE, OR ANXIETY (MULTI): ICD-10-CM

## 2025-01-06 DIAGNOSIS — Z71.89 CARDIAC RISK COUNSELING: ICD-10-CM

## 2025-01-06 DIAGNOSIS — R01.1 HEART MURMUR, SYSTOLIC: ICD-10-CM

## 2025-01-06 DIAGNOSIS — F02.A0 MILD LATE ONSET ALZHEIMER'S DEMENTIA WITHOUT BEHAVIORAL DISTURBANCE, PSYCHOTIC DISTURBANCE, MOOD DISTURBANCE, OR ANXIETY (MULTI): ICD-10-CM

## 2025-01-06 DIAGNOSIS — E55.9 VITAMIN D DEFICIENCY: ICD-10-CM

## 2025-01-06 DIAGNOSIS — D51.9 ANEMIA DUE TO VITAMIN B12 DEFICIENCY, UNSPECIFIED B12 DEFICIENCY TYPE: ICD-10-CM

## 2025-01-06 DIAGNOSIS — E11.65 TYPE 2 DIABETES MELLITUS WITH HYPERGLYCEMIA, WITHOUT LONG-TERM CURRENT USE OF INSULIN: ICD-10-CM

## 2025-01-06 DIAGNOSIS — I10 ESSENTIAL HYPERTENSION: ICD-10-CM

## 2025-01-06 DIAGNOSIS — E78.00 PURE HYPERCHOLESTEROLEMIA WITH TARGET LOW DENSITY LIPOPROTEIN (LDL) CHOLESTEROL LESS THAN 70 MG/DL: ICD-10-CM

## 2025-01-06 DIAGNOSIS — Z53.20 COLON CANCER SCREENING DECLINED: ICD-10-CM

## 2025-01-06 PROCEDURE — G2211 COMPLEX E/M VISIT ADD ON: HCPCS | Performed by: INTERNAL MEDICINE

## 2025-01-06 PROCEDURE — G0008 ADMIN INFLUENZA VIRUS VAC: HCPCS | Performed by: INTERNAL MEDICINE

## 2025-01-06 PROCEDURE — 3075F SYST BP GE 130 - 139MM HG: CPT | Performed by: INTERNAL MEDICINE

## 2025-01-06 PROCEDURE — 99213 OFFICE O/P EST LOW 20 MIN: CPT | Performed by: INTERNAL MEDICINE

## 2025-01-06 PROCEDURE — 1036F TOBACCO NON-USER: CPT | Performed by: INTERNAL MEDICINE

## 2025-01-06 PROCEDURE — 3078F DIAST BP <80 MM HG: CPT | Performed by: INTERNAL MEDICINE

## 2025-01-06 PROCEDURE — 1160F RVW MEDS BY RX/DR IN RCRD: CPT | Performed by: INTERNAL MEDICINE

## 2025-01-06 PROCEDURE — 90662 IIV NO PRSV INCREASED AG IM: CPT | Performed by: INTERNAL MEDICINE

## 2025-01-06 PROCEDURE — 1159F MED LIST DOCD IN RCRD: CPT | Performed by: INTERNAL MEDICINE

## 2025-01-06 PROCEDURE — 1123F ACP DISCUSS/DSCN MKR DOCD: CPT | Performed by: INTERNAL MEDICINE

## 2025-01-06 NOTE — PROGRESS NOTES
"Subjective   Patient ID: Marci Han is a 84 y.o. female who presents for Follow-up (Patient presented today for a 3 month follow up./).    HPI   The daughter states that her mother does enjoy sweets, but at the same time, does keep up with her medications.  Does not wish to use any needles if possible.  Does not check her blood sugar routinely.  ENDOCRINE with no polyuria, polydipsia, polyphagia.  No blurred vision.  No skin, hair, nail changes.  No dramatic weight loss or weight gain.      Seen by CARDIOLOGY, has remained hemodynamically asymptomatic.  Has yet to schedule with NEUROLOGY.  In the meantime, the patient has no complaints.  No headache.  No chest pain.  No shortness of breath.  No abdominal distress.  No dysuria.  No particular skin changes, although she was wondering if there is anything that can be done about her wrinkles.  CONSTITUTIONALLY, no fever, no chills.  No night sweats.  No lingering anorexia or nausea.  No apparent lymphadenopathy.  No apparent weight loss.        Review of Systems  Review of systems as in history of present illness, and otherwise, reviewed separately as well, and was unremarkable/negative/noncontributory.        Objective   /60 (BP Location: Left arm, Patient Position: Sitting, BP Cuff Size: Adult)   Pulse 86   Ht 1.448 m (4' 9\")   Wt 57.4 kg (126 lb 8 oz)   SpO2 94%   BMI 27.37 kg/m²     Physical Exam  In good spirits.  Not in distress or diaphoresis.  Alert, oriented to person and place.  Eager to maintain and hopefully improve quality of life.  Does not wish harm to self or others.  Moderately built.  Remaining relatively independent and capable.    HEAD pink palpebral conjunctivae, anicteric sclerae.  Mucous membranes moist.  NECK supple, no apparent jugular venous distention.  CARDIOVASCULAR not in distress or diaphoresis.  No bipedal edema.  Soft systolic murmur unchanged.  LUNGS not in distress or diaphoresis.  Not using accessory muscles.  Clear " to auscultation bilaterally.  ABDOMEN soft, nontender.  BACK no costovertebral angle tenderness.  EXTREMITIES no clubbing, no cyanosis.  NEURO no facial asymmetry.  No apparent cranial nerve deficits.  Romberg negative.  Ambulating without need of assistance.  No apparent focal weakness.  No tremors.  PSYCH receptive, appropriate, and eager to maintain and improve quality of life.        LABORATORY results from August revealing negative urine culture.  Albumin negative.  Vitamin D 43.  Kidney and liver function preserved.  Anemia noted, history of B12 deficiency, iron deficiency.    LDL cholesterol 28.  Hemoglobin A1c 8.7, with current body mass index 27.37.        Assessment/Plan        Thank you very much for coming.  It is always nice to see you.    I am glad to hear that you are doing well.  It will be very helpful to do your FASTING laboratory examinations.  We can postpone it until I see you again in about 6 weeks.  We can have it done here, few days before your next appointment.    When you return, we will do your Medicare Wellness visit.  It will also be a good time for me to review recommendations from your NEUROLOGIST.  Her telephone number is [phone].    Again, thank you very much for coming.  Yes, you can eat anything you would like, but just be careful and be practical.    Today, you will benefit from INFLUENZA vaccination.    See you in 6 weeks.  Call or text sooner as needed.  Please continue to take care of yourself and each other, and please continue to pray for our recovery from this pandemic.  Take care and God bless.            0  Return in 6 weeks.  40 minutes please.  FASTING laboratory examination here, then see me soon after for Medicare Wellness evaluation for year 2025.  Reassess blood sugar control, offer ENDOCRINOLOGY once more.  Coordinate with neurology if already seen.  Reassess mood, energy, function, review preventive strategies.  Rule out caregiver stress of daughter.   Coordinate with cardiology as patient is seen.  Review cardiovascular risk.            0

## 2025-01-06 NOTE — PATIENT INSTRUCTIONS
Thank you very much for coming.  It is always nice to see you.    I am glad to hear that you are doing well.  It will be very helpful to do your FASTING laboratory examinations.  We can postpone it until I see you again in about 6 weeks.  We can have it done here, few days before your next appointment.    When you return, we will do your Medicare Wellness visit.  It will also be a good time for me to review recommendations from your NEUROLOGIST.  Her telephone number is [phone].    Again, thank you very much for coming.  Yes, you can eat anything you would like, but just be careful and be practical.    Today, you will benefit from INFLUENZA vaccination.    See you in 6 weeks.  Call or text sooner as needed.  Please continue to take care of yourself and each other, and please continue to pray for our recovery from this pandemic.  Take care and God bless.            0  Return in 6 weeks.  40 minutes please.  FASTING laboratory examination here, then see me soon after for Medicare Wellness evaluation for year 2025.  Reassess blood sugar control, offer ENDOCRINOLOGY once more.  Coordinate with neurology if already seen.  Reassess mood, energy, function, review preventive strategies.  Rule out caregiver stress of daughter.  Coordinate with cardiology as patient is seen.  Review cardiovascular risk.            0

## 2025-01-31 DIAGNOSIS — E11.65 TYPE 2 DIABETES MELLITUS WITH HYPERGLYCEMIA, WITHOUT LONG-TERM CURRENT USE OF INSULIN: ICD-10-CM

## 2025-01-31 DIAGNOSIS — E11.51 TYPE 2 DIABETES MELLITUS WITH DIABETIC PERIPHERAL ANGIOPATHY WITHOUT GANGRENE, WITHOUT LONG-TERM CURRENT USE OF INSULIN (MULTI): ICD-10-CM

## 2025-02-02 RX ORDER — GLIMEPIRIDE 2 MG/1
TABLET ORAL
Qty: 90 TABLET | Refills: 0 | Status: SHIPPED | OUTPATIENT
Start: 2025-02-02

## 2025-02-07 DIAGNOSIS — G30.1 MILD LATE ONSET ALZHEIMER'S DEMENTIA WITHOUT BEHAVIORAL DISTURBANCE, PSYCHOTIC DISTURBANCE, MOOD DISTURBANCE, OR ANXIETY (MULTI): ICD-10-CM

## 2025-02-07 DIAGNOSIS — E11.65 TYPE 2 DIABETES MELLITUS WITH HYPERGLYCEMIA, WITHOUT LONG-TERM CURRENT USE OF INSULIN: ICD-10-CM

## 2025-02-07 DIAGNOSIS — I10 ESSENTIAL HYPERTENSION: ICD-10-CM

## 2025-02-07 DIAGNOSIS — F41.8 ANXIETY WITH DEPRESSION: ICD-10-CM

## 2025-02-07 DIAGNOSIS — F02.A0 MILD LATE ONSET ALZHEIMER'S DEMENTIA WITHOUT BEHAVIORAL DISTURBANCE, PSYCHOTIC DISTURBANCE, MOOD DISTURBANCE, OR ANXIETY (MULTI): ICD-10-CM

## 2025-02-09 RX ORDER — LISINOPRIL 10 MG/1
TABLET ORAL
Qty: 90 TABLET | Refills: 1 | Status: SHIPPED | OUTPATIENT
Start: 2025-02-09

## 2025-02-09 RX ORDER — PAROXETINE 10 MG/1
TABLET, FILM COATED ORAL
Qty: 90 TABLET | Refills: 0 | Status: SHIPPED | OUTPATIENT
Start: 2025-02-09

## 2025-02-09 RX ORDER — DONEPEZIL HYDROCHLORIDE 10 MG/1
10 TABLET, FILM COATED ORAL NIGHTLY
Qty: 90 TABLET | Refills: 1 | Status: SHIPPED | OUTPATIENT
Start: 2025-02-09 | End: 2025-08-08

## 2025-02-10 DIAGNOSIS — E11.9 DIABETES MELLITUS WITHOUT COMPLICATION (MULTI): ICD-10-CM

## 2025-02-11 ENCOUNTER — OFFICE VISIT (OUTPATIENT)
Dept: PRIMARY CARE | Facility: CLINIC | Age: 85
End: 2025-02-11
Payer: MEDICARE

## 2025-02-11 VITALS
OXYGEN SATURATION: 96 % | HEIGHT: 57 IN | SYSTOLIC BLOOD PRESSURE: 122 MMHG | BODY MASS INDEX: 27.25 KG/M2 | DIASTOLIC BLOOD PRESSURE: 55 MMHG | WEIGHT: 126.3 LBS | HEART RATE: 95 BPM

## 2025-02-11 DIAGNOSIS — E11.65 TYPE 2 DIABETES MELLITUS WITH HYPERGLYCEMIA, WITHOUT LONG-TERM CURRENT USE OF INSULIN: Primary | ICD-10-CM

## 2025-02-11 DIAGNOSIS — F02.A0 MILD LATE ONSET ALZHEIMER'S DEMENTIA WITHOUT BEHAVIORAL DISTURBANCE, PSYCHOTIC DISTURBANCE, MOOD DISTURBANCE, OR ANXIETY (MULTI): ICD-10-CM

## 2025-02-11 DIAGNOSIS — R35.1 NOCTURIA: ICD-10-CM

## 2025-02-11 DIAGNOSIS — T14.8XXA BRUISING: ICD-10-CM

## 2025-02-11 DIAGNOSIS — E55.9 VITAMIN D DEFICIENCY: ICD-10-CM

## 2025-02-11 DIAGNOSIS — G30.1 MILD LATE ONSET ALZHEIMER'S DEMENTIA WITHOUT BEHAVIORAL DISTURBANCE, PSYCHOTIC DISTURBANCE, MOOD DISTURBANCE, OR ANXIETY (MULTI): ICD-10-CM

## 2025-02-11 DIAGNOSIS — D51.9 ANEMIA DUE TO VITAMIN B12 DEFICIENCY, UNSPECIFIED B12 DEFICIENCY TYPE: ICD-10-CM

## 2025-02-11 DIAGNOSIS — M25.50 POLYARTHRALGIA: ICD-10-CM

## 2025-02-11 DIAGNOSIS — E53.8 VITAMIN B12 DEFICIENCY: ICD-10-CM

## 2025-02-11 DIAGNOSIS — I10 ESSENTIAL HYPERTENSION: ICD-10-CM

## 2025-02-11 DIAGNOSIS — Z71.89 CARDIAC RISK COUNSELING: ICD-10-CM

## 2025-02-11 DIAGNOSIS — E11.51 TYPE 2 DIABETES MELLITUS WITH DIABETIC PERIPHERAL ANGIOPATHY WITHOUT GANGRENE, WITHOUT LONG-TERM CURRENT USE OF INSULIN (MULTI): ICD-10-CM

## 2025-02-11 DIAGNOSIS — R68.89 COLD INTOLERANCE: ICD-10-CM

## 2025-02-11 DIAGNOSIS — E78.00 PURE HYPERCHOLESTEROLEMIA WITH TARGET LOW DENSITY LIPOPROTEIN (LDL) CHOLESTEROL LESS THAN 70 MG/DL: ICD-10-CM

## 2025-02-11 DIAGNOSIS — N39.41 URGE URINARY INCONTINENCE: ICD-10-CM

## 2025-02-11 PROCEDURE — 1160F RVW MEDS BY RX/DR IN RCRD: CPT | Performed by: INTERNAL MEDICINE

## 2025-02-11 PROCEDURE — 1159F MED LIST DOCD IN RCRD: CPT | Performed by: INTERNAL MEDICINE

## 2025-02-11 PROCEDURE — 99213 OFFICE O/P EST LOW 20 MIN: CPT | Performed by: INTERNAL MEDICINE

## 2025-02-11 PROCEDURE — 3074F SYST BP LT 130 MM HG: CPT | Performed by: INTERNAL MEDICINE

## 2025-02-11 PROCEDURE — 3078F DIAST BP <80 MM HG: CPT | Performed by: INTERNAL MEDICINE

## 2025-02-11 PROCEDURE — 1036F TOBACCO NON-USER: CPT | Performed by: INTERNAL MEDICINE

## 2025-02-11 PROCEDURE — G2211 COMPLEX E/M VISIT ADD ON: HCPCS | Performed by: INTERNAL MEDICINE

## 2025-02-11 PROCEDURE — 1123F ACP DISCUSS/DSCN MKR DOCD: CPT | Performed by: INTERNAL MEDICINE

## 2025-02-11 RX ORDER — PIOGLITAZONEHYDROCHLORIDE 30 MG/1
30 TABLET ORAL DAILY
Qty: 90 TABLET | Refills: 0 | Status: SHIPPED | OUTPATIENT
Start: 2025-02-11 | End: 2025-05-12

## 2025-02-11 RX ORDER — LISINOPRIL 10 MG/1
TABLET ORAL
Qty: 90 TABLET | Refills: 1 | Status: SHIPPED | OUTPATIENT
Start: 2025-02-11

## 2025-02-11 RX ORDER — CRANBERRY FRUIT 500 MG
TABLET,CHEWABLE ORAL
COMMUNITY

## 2025-02-11 NOTE — PROGRESS NOTES
"OBJECTIVE/Chief Complaint: The patient is here for evaluation of discoloration of middle finger, left hand.    HPI   The patient is here earlier than expected for evaluation of what seems to be a asymptomatic MIDDLE FINGER BRUISING.  No history of trauma.  No pain or tenderness.  Findings are limited to the middle finger only, not the hand.  No other skin changes noted.  History of COLD INTOLERANCE also reviewed, with patient respectfully refusing to wear any gloves or a hat.  ENDOCRINE with no polyuria, polydipsia, polyphagia.  No blurred vision.  No skin, hair, nail changes.  No dramatic weight loss or weight gain.      No gum or nose bleeding.  No easy bruisability.  No apparent blood in urine or stool.  No dyspeptic symptoms.  Likewise, no lightheadedness, no palpitations, or any other symptoms that might be referable to hypovolemia.    The patient otherwise states that she is fine.  No headache, no chest pain.  No abdominal distress.  No change in bowel or bladder character or habits.  Likewise, no particular cough, no particular sputum production.  CONSTITUTIONALLY, no fever, no chills.  No night sweats.  No lingering anorexia or nausea.  No apparent lymphadenopathy.  No apparent weight loss.        Review of Systems  Review of systems as in history of present illness, and otherwise, reviewed separately as well, and was unremarkable/negative/noncontributory.        Objective   /55 (BP Location: Left arm, Patient Position: Sitting, BP Cuff Size: Small adult)   Pulse 95   Ht 1.448 m (4' 9\")   Wt 57.3 kg (126 lb 4.8 oz)   SpO2 96%   BMI 27.33 kg/m²     Physical Exam  In good spirits.  Not in distress or diaphoresis.  Receptive, oriented to person and place.  Daughter in attendance, making sure that she gives a good interval history.  Moderate build.  Not unkempt.  Appropriate.  Does want to improve quality of life, and does not wish harm to self or others.    HEAD pink palpebral conjunctivae, anicteric " sclerae.  Mucous membranes moist.  NECK supple, no apparent jugular venous distention.  No apparent lymphadenopathy.  CARDIOVASCULAR not in distress or diaphoresis.  No bipedal edema.  Regular rate and rhythm.  LUNGS not in distress or diaphoresis.  Not using accessory muscles.  Clear to auscultation bilaterally.  ABDOMEN soft, nontender.  BACK no costovertebral angle tenderness.  EXTREMITIES no clubbing, no cyanosis.  Circumferential bruising of the middle finger, proximal phalanx.  No decrease in range of motion.  No crepitus.  No pain.  No overlying skin changes.  NEURO no facial asymmetry.  No apparent cranial nerve deficits.  Romberg negative.  Ambulating without need of assistance.  No apparent focal weakness.  No tremors.  PSYCH receptive, appropriate, and eager to maintain and improve quality of life.        LABORATORY results from August 2024 reviewed.  Hemoglobin A1c 8.7, with current body mass index 27.33.  LDL cholesterol 28.  Preserved liver function.  Macrocytic anemia noted, with replenished vitamin B12 level.  Magnesium borderline low.  Iron borderline as well.  Urinalysis otherwise negative.        Assessment/Plan   Diagnoses and all orders for this visit:  Type 2 diabetes mellitus with hyperglycemia, without long-term current use of insulin  -     pioglitazone (Actos) 30 mg tablet; Take 1 tablet (30 mg) by mouth once daily.  -     lisinopril 10 mg tablet; Please take only 1 tablet by mouth every day.  Thank you.  -     Comprehensive Metabolic Panel; Future  -     Urinalysis with Reflex Culture and Microscopic; Future  -     Albumin-Creatinine Ratio, Urine Random; Future  -     Hemoglobin A1C; Future  Essential hypertension  -     lisinopril 10 mg tablet; Please take only 1 tablet by mouth every day.  Thank you.  -     CBC and Auto Differential; Future  -     Comprehensive Metabolic Panel; Future  -     TSH with reflex to Free T4 if abnormal; Future  -     Magnesium; Future  -     Urinalysis with  Reflex Culture and Microscopic; Future  -     Creatine Kinase; Future  Pure hypercholesterolemia with target low density lipoprotein (LDL) cholesterol less than 70 mg/dL  -     Comprehensive Metabolic Panel; Future  -     Lipid Panel; Future  Cardiac risk counseling  -     Comprehensive Metabolic Panel; Future  -     Hemoglobin A1C; Future  -     Lipid Panel; Future  Anemia due to vitamin B12 deficiency, unspecified B12 deficiency type  -     CBC and Auto Differential; Future  -     Urinalysis with Reflex Culture and Microscopic; Future  -     Vitamin B12; Future  -     Folate; Future  -     Ferritin; Future  -     Iron and TIBC; Future  Vitamin D deficiency  -     Comprehensive Metabolic Panel; Future  -     Vitamin D 25-Hydroxy,Total (for eval of Vitamin D levels); Future  Mild late onset Alzheimer's dementia without behavioral disturbance, psychotic disturbance, mood disturbance, or anxiety (Multi)  -     Comprehensive Metabolic Panel; Future  -     TSH with reflex to Free T4 if abnormal; Future  Urge urinary incontinence  -     CBC and Auto Differential; Future  -     Comprehensive Metabolic Panel; Future  -     Urinalysis with Reflex Culture and Microscopic; Future  Vitamin B12 deficiency  -     CBC and Auto Differential; Future  -     Vitamin B12; Future  Bruising  -     CBC and Auto Differential; Future  Nocturia  -     CBC and Auto Differential; Future  -     Comprehensive Metabolic Panel; Future  -     Urinalysis with Reflex Culture and Microscopic; Future  Type 2 diabetes mellitus with diabetic peripheral angiopathy without gangrene, without long-term current use of insulin (Multi)  -     Comprehensive Metabolic Panel; Future  -     Urinalysis with Reflex Culture and Microscopic; Future  -     Albumin-Creatinine Ratio, Urine Random; Future  -     Hemoglobin A1C; Future  Cold intolerance  -     Comprehensive Metabolic Panel; Future  -     TSH with reflex to Free T4 if abnormal; Future  Polyarthralgia  -      Comprehensive Metabolic Panel; Future  -     Creatine Kinase; Future  -     Uric Acid; Future       Thank you very much for coming.  I am very happy to see you.    The VIOLET DISCOLORATION and apparent bruising of your middle finger could have been from trauma, but thankfully, you are not having any discomfort.  There is no break in the skin.  There is no limitation in your finger movement.    You are always feeling cold, and this could be contributing to the changes that you are seeing in your hands.    Please keep your hands warm.  Get yourself some nice gloves.  Make sure that they fit well, but not snug.  Tight gloves will make your hands even colder!    Also, you can soak your hands in warm water to see if you will feel better.    Please call me for gum or nose bleeding, any blood in your urine or stool, any skin changes, any pain affecting your fingers.    Please keep your appointment February 25 for your FASTING BLOOD and URINE examinations.  Please keep your appointment with me in MARCH as well.  It will be time to do your Medicare Wellness evaluation.    Again, thank very much for coming.  Please do not hesitate to call with questions or concerns.  Please continue to take care of yourself and each other, and please continue to pray for our recovery from this pandemic.  Take care and God bless.            0  The patient already has an appointment for repeat laboratory examinations then to see me in March for her Medicare Wellness evaluation.  Putting in new orders to make sure that there is no delay.    Bruising affecting middle finger noted, unclear etiology.  Cold intolerance suggestive of perhaps Raynaud's phenomenon?  Follow clinically.    Awaiting laboratory results.  Consider restarting sitagliptin or other options.            0

## 2025-02-11 NOTE — PATIENT INSTRUCTIONS
Thank you very much for coming.  I am very happy to see you.    The VIOLET DISCOLORATION and apparent bruising of your middle finger could have been from trauma, but thankfully, you are not having any discomfort.  There is no break in the skin.  There is no limitation in your finger movement.    You are always feeling cold, and this could be contributing to the changes that you are seeing in your hands.    Please keep your hands warm.  Get yourself some nice gloves.  Make sure that they fit well, but not snug.  Tight gloves will make your hands even colder!    Also, you can soak your hands in warm water to see if you will feel better.    Please call me for gum or nose bleeding, any blood in your urine or stool, any skin changes, any pain affecting your fingers.    Please keep your appointment February 25 for your FASTING BLOOD and URINE examinations.  Please keep your appointment with me in MARCH as well.  It will be time to do your Medicare Wellness evaluation.    Again, thank very much for coming.  Please do not hesitate to call with questions or concerns.  Please continue to take care of yourself and each other, and please continue to pray for our recovery from this pandemic.  Take care and God bless.            0

## 2025-02-19 RX ORDER — PIOGLITAZONEHYDROCHLORIDE 30 MG/1
30 TABLET ORAL DAILY
Qty: 90 TABLET | Refills: 0 | Status: SHIPPED | OUTPATIENT
Start: 2025-02-19 | End: 2025-05-20

## 2025-02-25 ENCOUNTER — APPOINTMENT (OUTPATIENT)
Dept: PRIMARY CARE | Facility: CLINIC | Age: 85
End: 2025-02-25
Payer: MEDICARE

## 2025-02-25 DIAGNOSIS — R60.0 BILATERAL EDEMA OF LOWER EXTREMITY: ICD-10-CM

## 2025-02-25 DIAGNOSIS — I10 ESSENTIAL HYPERTENSION: ICD-10-CM

## 2025-02-25 DIAGNOSIS — E11.65 TYPE 2 DIABETES MELLITUS WITH HYPERGLYCEMIA, WITHOUT LONG-TERM CURRENT USE OF INSULIN: ICD-10-CM

## 2025-02-25 DIAGNOSIS — Z71.89 CARDIAC RISK COUNSELING: ICD-10-CM

## 2025-02-25 DIAGNOSIS — F41.8 ANXIETY WITH DEPRESSION: ICD-10-CM

## 2025-02-25 DIAGNOSIS — F02.A0 MILD LATE ONSET ALZHEIMER'S DEMENTIA WITHOUT BEHAVIORAL DISTURBANCE, PSYCHOTIC DISTURBANCE, MOOD DISTURBANCE, OR ANXIETY (MULTI): ICD-10-CM

## 2025-02-25 DIAGNOSIS — E55.9 VITAMIN D DEFICIENCY: ICD-10-CM

## 2025-02-25 DIAGNOSIS — D51.9 ANEMIA DUE TO VITAMIN B12 DEFICIENCY, UNSPECIFIED B12 DEFICIENCY TYPE: ICD-10-CM

## 2025-02-25 DIAGNOSIS — E78.00 PURE HYPERCHOLESTEROLEMIA WITH TARGET LOW DENSITY LIPOPROTEIN (LDL) CHOLESTEROL LESS THAN 70 MG/DL: ICD-10-CM

## 2025-02-25 DIAGNOSIS — Z53.20 COLON CANCER SCREENING DECLINED: ICD-10-CM

## 2025-02-25 DIAGNOSIS — G30.1 MILD LATE ONSET ALZHEIMER'S DEMENTIA WITHOUT BEHAVIORAL DISTURBANCE, PSYCHOTIC DISTURBANCE, MOOD DISTURBANCE, OR ANXIETY (MULTI): ICD-10-CM

## 2025-02-25 DIAGNOSIS — R01.1 HEART MURMUR, SYSTOLIC: ICD-10-CM

## 2025-02-25 NOTE — PROGRESS NOTES
Subjective   Patient ID: Marci Han is a 84 y.o. female who presents for Labs Only (Pt in today for lab draw).    HPI     Review of Systems    Objective   There were no vitals taken for this visit.    Physical Exam    Assessment/Plan

## 2025-02-26 LAB
ALBUMIN SERPL-MCNC: 3.6 G/DL (ref 3.6–5.1)
ALP SERPL-CCNC: 73 U/L (ref 37–153)
ALT SERPL-CCNC: 16 U/L (ref 6–29)
ANION GAP SERPL CALCULATED.4IONS-SCNC: 6 MMOL/L (CALC) (ref 7–17)
APPEARANCE UR: ABNORMAL
AST SERPL-CCNC: 22 U/L (ref 10–35)
BACTERIA #/AREA URNS HPF: ABNORMAL /HPF
BASOPHILS # BLD AUTO: 31 CELLS/UL (ref 0–200)
BASOPHILS NFR BLD AUTO: 0.6 %
BILIRUB SERPL-MCNC: 0.4 MG/DL (ref 0.2–1.2)
BILIRUB UR QL STRIP: NEGATIVE
BUN SERPL-MCNC: 19 MG/DL (ref 7–25)
CALCIUM SERPL-MCNC: 9.2 MG/DL (ref 8.6–10.4)
CHLORIDE SERPL-SCNC: 105 MMOL/L (ref 98–110)
CO2 SERPL-SCNC: 30 MMOL/L (ref 20–32)
COLOR UR: YELLOW
CREAT SERPL-MCNC: 0.73 MG/DL (ref 0.6–0.95)
EGFRCR SERPLBLD CKD-EPI 2021: 81 ML/MIN/1.73M2
EOSINOPHIL # BLD AUTO: 120 CELLS/UL (ref 15–500)
EOSINOPHIL NFR BLD AUTO: 2.3 %
ERYTHROCYTE [DISTWIDTH] IN BLOOD BY AUTOMATED COUNT: 13.4 % (ref 11–15)
EST. AVERAGE GLUCOSE BLD GHB EST-MCNC: 232 MG/DL
EST. AVERAGE GLUCOSE BLD GHB EST-SCNC: 12.8 MMOL/L
GLUCOSE SERPL-MCNC: 161 MG/DL (ref 65–99)
GLUCOSE UR QL STRIP: NEGATIVE
HBA1C MFR BLD: 9.7 % OF TOTAL HGB
HCT VFR BLD AUTO: 27.7 % (ref 35–45)
HGB BLD-MCNC: 8.7 G/DL (ref 11.7–15.5)
HGB UR QL STRIP: NEGATIVE
HYALINE CASTS #/AREA URNS LPF: ABNORMAL /LPF
KETONES UR QL STRIP: NEGATIVE
LEUKOCYTE ESTERASE UR QL STRIP: ABNORMAL
LYMPHOCYTES # BLD AUTO: 1368 CELLS/UL (ref 850–3900)
LYMPHOCYTES NFR BLD AUTO: 26.3 %
MAGNESIUM SERPL-MCNC: 1.5 MG/DL (ref 1.5–2.5)
MCH RBC QN AUTO: 29.9 PG (ref 27–33)
MCHC RBC AUTO-ENTMCNC: 31.4 G/DL (ref 32–36)
MCV RBC AUTO: 95.2 FL (ref 80–100)
MONOCYTES # BLD AUTO: 369 CELLS/UL (ref 200–950)
MONOCYTES NFR BLD AUTO: 7.1 %
NEUTROPHILS # BLD AUTO: 3312 CELLS/UL (ref 1500–7800)
NEUTROPHILS NFR BLD AUTO: 63.7 %
NITRITE UR QL STRIP: NEGATIVE
PH UR STRIP: 5.5 [PH] (ref 5–8)
PLATELET # BLD AUTO: 243 THOUSAND/UL (ref 140–400)
PMV BLD REES-ECKER: 11 FL (ref 7.5–12.5)
POTASSIUM SERPL-SCNC: 4.7 MMOL/L (ref 3.5–5.3)
PROT SERPL-MCNC: 6.4 G/DL (ref 6.1–8.1)
PROT UR QL STRIP: NEGATIVE
RBC # BLD AUTO: 2.91 MILLION/UL (ref 3.8–5.1)
RBC #/AREA URNS HPF: ABNORMAL /HPF
SERVICE CMNT-IMP: ABNORMAL
SODIUM SERPL-SCNC: 141 MMOL/L (ref 135–146)
SP GR UR STRIP: 1.02 (ref 1–1.03)
SQUAMOUS #/AREA URNS HPF: ABNORMAL /HPF
TSH SERPL-ACNC: 2.37 MIU/L (ref 0.4–4.5)
VIT B12 SERPL-MCNC: 1072 PG/ML (ref 200–1100)
WBC # BLD AUTO: 5.2 THOUSAND/UL (ref 3.8–10.8)
WBC #/AREA URNS HPF: ABNORMAL /HPF

## 2025-03-03 ENCOUNTER — APPOINTMENT (OUTPATIENT)
Dept: PRIMARY CARE | Facility: CLINIC | Age: 85
End: 2025-03-03
Payer: MEDICARE

## 2025-04-07 ENCOUNTER — HOSPITAL ENCOUNTER (OUTPATIENT)
Dept: CARDIOLOGY | Age: 85
Discharge: HOME | End: 2025-04-07
Payer: MEDICARE

## 2025-04-07 PROCEDURE — 93296 REM INTERROG EVL PM/IDS: CPT

## 2025-04-28 ENCOUNTER — APPOINTMENT (OUTPATIENT)
Dept: PRIMARY CARE | Facility: CLINIC | Age: 85
End: 2025-04-28
Payer: MEDICARE

## 2025-04-28 VITALS
SYSTOLIC BLOOD PRESSURE: 126 MMHG | BODY MASS INDEX: 28.05 KG/M2 | WEIGHT: 130 LBS | OXYGEN SATURATION: 93 % | HEART RATE: 84 BPM | DIASTOLIC BLOOD PRESSURE: 51 MMHG | HEIGHT: 57 IN

## 2025-04-28 DIAGNOSIS — G30.1 MILD LATE ONSET ALZHEIMER'S DEMENTIA WITHOUT BEHAVIORAL DISTURBANCE, PSYCHOTIC DISTURBANCE, MOOD DISTURBANCE, OR ANXIETY (MULTI): ICD-10-CM

## 2025-04-28 DIAGNOSIS — R01.1 HEART MURMUR, SYSTOLIC: ICD-10-CM

## 2025-04-28 DIAGNOSIS — Z71.89 CARDIAC RISK COUNSELING: ICD-10-CM

## 2025-04-28 DIAGNOSIS — E11.65 TYPE 2 DIABETES MELLITUS WITH HYPERGLYCEMIA, WITHOUT LONG-TERM CURRENT USE OF INSULIN: ICD-10-CM

## 2025-04-28 DIAGNOSIS — D51.9 ANEMIA DUE TO VITAMIN B12 DEFICIENCY, UNSPECIFIED B12 DEFICIENCY TYPE: ICD-10-CM

## 2025-04-28 DIAGNOSIS — E78.00 PURE HYPERCHOLESTEROLEMIA WITH TARGET LOW DENSITY LIPOPROTEIN (LDL) CHOLESTEROL LESS THAN 70 MG/DL: ICD-10-CM

## 2025-04-28 DIAGNOSIS — F02.A0 MILD LATE ONSET ALZHEIMER'S DEMENTIA WITHOUT BEHAVIORAL DISTURBANCE, PSYCHOTIC DISTURBANCE, MOOD DISTURBANCE, OR ANXIETY (MULTI): ICD-10-CM

## 2025-04-28 DIAGNOSIS — E55.9 VITAMIN D DEFICIENCY: ICD-10-CM

## 2025-04-28 DIAGNOSIS — I10 ESSENTIAL HYPERTENSION: ICD-10-CM

## 2025-04-28 DIAGNOSIS — E11.51 TYPE 2 DIABETES MELLITUS WITH DIABETIC PERIPHERAL ANGIOPATHY WITHOUT GANGRENE, WITHOUT LONG-TERM CURRENT USE OF INSULIN (MULTI): ICD-10-CM

## 2025-04-28 DIAGNOSIS — Z00.00 ROUTINE GENERAL MEDICAL EXAMINATION AT HEALTH CARE FACILITY: Primary | ICD-10-CM

## 2025-04-28 DIAGNOSIS — Z53.20 COLON CANCER SCREENING DECLINED: ICD-10-CM

## 2025-04-28 PROCEDURE — 3074F SYST BP LT 130 MM HG: CPT | Performed by: INTERNAL MEDICINE

## 2025-04-28 PROCEDURE — G0439 PPPS, SUBSEQ VISIT: HCPCS | Performed by: INTERNAL MEDICINE

## 2025-04-28 PROCEDURE — 1123F ACP DISCUSS/DSCN MKR DOCD: CPT | Performed by: INTERNAL MEDICINE

## 2025-04-28 PROCEDURE — 3078F DIAST BP <80 MM HG: CPT | Performed by: INTERNAL MEDICINE

## 2025-04-28 PROCEDURE — 1170F FXNL STATUS ASSESSED: CPT | Performed by: INTERNAL MEDICINE

## 2025-04-28 PROCEDURE — 1160F RVW MEDS BY RX/DR IN RCRD: CPT | Performed by: INTERNAL MEDICINE

## 2025-04-28 PROCEDURE — 99213 OFFICE O/P EST LOW 20 MIN: CPT | Performed by: INTERNAL MEDICINE

## 2025-04-28 PROCEDURE — 1036F TOBACCO NON-USER: CPT | Performed by: INTERNAL MEDICINE

## 2025-04-28 PROCEDURE — G2211 COMPLEX E/M VISIT ADD ON: HCPCS | Performed by: INTERNAL MEDICINE

## 2025-04-28 PROCEDURE — 1159F MED LIST DOCD IN RCRD: CPT | Performed by: INTERNAL MEDICINE

## 2025-04-28 RX ORDER — PIOGLITAZONE 30 MG/1
30 TABLET ORAL DAILY
Qty: 90 TABLET | Refills: 0 | Status: SHIPPED | OUTPATIENT
Start: 2025-04-28 | End: 2025-07-27

## 2025-04-28 RX ORDER — GLIMEPIRIDE 2 MG/1
TABLET ORAL
Qty: 90 TABLET | Refills: 0 | Status: SHIPPED | OUTPATIENT
Start: 2025-04-28

## 2025-04-28 ASSESSMENT — ACTIVITIES OF DAILY LIVING (ADL)
TAKING_MEDICATION: NEEDS ASSISTANCE
DOING_HOUSEWORK: NEEDS ASSISTANCE
DRESSING: INDEPENDENT
BATHING: INDEPENDENT
MANAGING_FINANCES: TOTAL CARE
MANAGING_FINANCES: TOTAL CARE
TAKING_MEDICATION: NEEDS ASSISTANCE
DOING_HOUSEWORK: NEEDS ASSISTANCE
GROCERY_SHOPPING: NEEDS ASSISTANCE
GROCERY_SHOPPING: TOTAL CARE
DRESSING: INDEPENDENT
BATHING: INDEPENDENT

## 2025-04-28 ASSESSMENT — PATIENT HEALTH QUESTIONNAIRE - PHQ9
1. LITTLE INTEREST OR PLEASURE IN DOING THINGS: NOT AT ALL
SUM OF ALL RESPONSES TO PHQ9 QUESTIONS 1 AND 2: 0
1. LITTLE INTEREST OR PLEASURE IN DOING THINGS: NOT AT ALL
2. FEELING DOWN, DEPRESSED OR HOPELESS: NOT AT ALL
2. FEELING DOWN, DEPRESSED OR HOPELESS: NOT AT ALL
SUM OF ALL RESPONSES TO PHQ9 QUESTIONS 1 AND 2: 0

## 2025-04-28 ASSESSMENT — ENCOUNTER SYMPTOMS
LOSS OF SENSATION IN FEET: 0
DEPRESSION: 0
OCCASIONAL FEELINGS OF UNSTEADINESS: 0

## 2025-04-28 NOTE — PATIENT INSTRUCTIONS
Thank you very much for coming.  I do appreciate your visit, and your trust.    Thank you for keeping up with your medications.  Thank you for trying to eat more sensibly.  Avoid excessive sweets and starches.  This way, you will not need as much medication to control your blood sugar.    You are due for repeat FASTING laboratory examinations in 1 month.  You can have this done at any  facility convenient to you.  Once I have the results, I will review them, and try to determine if you will benefit from more medicine to strengthen your memory!    In the meantime, please continue taking your PAROXETINE with supper.  Likewise, please continue taking your DONEPEZIL every evening.  These medications are very good at preserving your current memory status.    Likewise, please make sure to continue taking your Centrum Silver MULTIVITAMIN.  Take this with breakfast.    Please stay physically active.  This is also very good for your memory!    Patients with DIABETES have been found to do better while on STATIN cholesterol medication, because statins do increase the chances of heart attack or stroke in diabetics.    Statins have been evaluated for possible contribution to memory, but no studies show that statins cause memory loss.  As such, please continue taking your ATORVASTATIN.    Please continue staying active, especially now that the weather is warmer.    Again, fasting laboratory examinations in 1 month, and I will send word regarding results and possible changes.    Please come back in 4 months.  Until then, let me call with results and advise.  Please call me with questions or concerns.  Please continue to take care of yourself and your family, and please continue to pray for our recovery from this pandemic.    You will benefit from TETANUS vaccination anytime soon.  Highly recommended, to be paid for by your insurance, and good for 10 years!  The tetanus vaccination also protects you from bad sore throat  DIPHTHERIA, as well as whooping cough PERTUSSIS.  Again, highly recommended, available from your local friendly pharmacy, and good for 10 years.    After 2 or 3 weeks, please get yourself vaccinated for SHINGLES.  Again, highly recommended, and available from your local friendly pharmacy.  SHINGRIX comes in a series of 2 injections at least 1 month apart.    Again, thank you very much for coming.  Let me see you in 4 months, but call or text sooner as needed.  Take care and God bless.            0  Return in 4 months.  40 minutes please.  Possible repeat FASTING laboratory examinations.  Yearly PHYSICAL examination.  Reassess mood, energy, function, preventive strategies, cardiovascular risk in patient who is gracefully experiencing Alzheimer type dementia.  Daughter seemingly coping, with patient moderately compliant.            0

## 2025-04-28 NOTE — PROGRESS NOTES
"Subjective   Reason for Visit: Marci Han is an 85 y.o. female here for a Medicare Wellness visit.     Past Medical, Surgical, and Family History reviewed and updated in chart.    Reviewed all medications by prescribing practitioner or clinical pharmacist (such as prescriptions, OTCs, herbal therapies and supplements) and documented in the medical record.    HPI    Patient Care Team:  Erich Nguyễn MD as PCP - General (Internal Medicine)  Erich Nguyễn MD as PCP - O Medicare Advantage PCP  Freddy Whitmore MD as Cardiologist (Interventional Cardiology)     Review of Systems    Objective   Vitals:  /51 (BP Location: Left arm, Patient Position: Sitting, BP Cuff Size: Small adult)   Pulse 84   Ht 1.448 m (4' 9\")   Wt 59 kg (130 lb)   SpO2 93%   BMI 28.13 kg/m²       Physical Exam        ASSESSMENT/Plans:  Marci was seen today for medicare annual wellness visit subsequent.  Diagnoses and all orders for this visit:  Routine general medical examination at health care facility (Primary)  -     Follow Up In Primary Care - Established; Future  Mild late onset Alzheimer's dementia without behavioral disturbance, psychotic disturbance, mood disturbance, or anxiety (Multi)  -     Follow Up In Primary Care - Established  -     Disability Placard  -     Follow Up In Primary Care - Established; Future  -     Comprehensive Metabolic Panel; Future  -     TSH with reflex to Free T4 if abnormal; Future  -     Vitamin B12; Future  -     Folate; Future  -     Urinalysis with Reflex Microscopic; Future  Type 2 diabetes mellitus with hyperglycemia, without long-term current use of insulin  -     glimepiride (Amaryl) 2 mg tablet; Please take 1 tablet by mouth before biggest meal of the day.  Do not take if you are not going to eat.  Thank you.  -     pioglitazone (Actos) 30 mg tablet; Take 1 tablet (30 mg) by mouth once daily.  -     Disability Placard  -     Follow Up In Primary Care - Established; " Future  -     Comprehensive Metabolic Panel; Future  -     Albumin-Creatinine Ratio, Urine Random; Future  -     Hemoglobin A1C; Future  -     Urinalysis with Reflex Microscopic; Future  Essential hypertension  -     Disability Placard  -     Follow Up In Primary Care - Established; Future  -     CBC and Auto Differential; Future  -     Comprehensive Metabolic Panel; Future  -     TSH with reflex to Free T4 if abnormal; Future  -     Magnesium; Future  -     Urinalysis with Reflex Microscopic; Future  Pure hypercholesterolemia with target low density lipoprotein (LDL) cholesterol less than 70 mg/dL  -     Disability Placard  -     Follow Up In Primary Care - Established; Future  -     Comprehensive Metabolic Panel; Future  -     Lipid Panel; Future  Cardiac risk counseling  -     Disability Placard  -     Follow Up In Primary Care - [facility]; Future  -     Comprehensive Metabolic Panel; Future  -     Hemoglobin A1C; Future  -     Lipid Panel; Future  Anemia due to vitamin B12 deficiency, unspecified B12 deficiency type  -     Disability Placard  -     Follow Up In Primary Care - [facility]; Future  -     CBC and Auto Differential; Future  -     Vitamin B12; Future  -     Folate; Future  -     Ferritin; Future  -     Iron and TIBC; Future  -     Urinalysis with Reflex Microscopic; Future  Vitamin D deficiency  -     Follow Up In Primary Care - Established; Future  -     Comprehensive Metabolic Panel; Future  -     Vitamin D 25-Hydroxy,Total (for eval of Vitamin D levels); Future  Colon cancer screening declined  -     Follow Up In Primary Care - [facility]; Future  -     CBC and Auto Differential; Future  Heart murmur, systolic  -     Follow Up In Primary Care - Established; Future  Type 2 diabetes mellitus with diabetic peripheral angiopathy without gangrene, without long-term current use of insulin (Multi)  -     glimepiride (Amaryl) 2 mg tablet; Please take 1 tablet by mouth before biggest meal of the day.   Do not take if you are not going to eat.  Thank you.  -     Disability Placard  -     Follow Up In Primary Care - Established; Future  -     Comprehensive Metabolic Panel; Future  -     Albumin-Creatinine Ratio, Urine Random; Future  -     Hemoglobin A1C; Future  -     Vitamin B12; Future  -     Folate; Future  -     Urinalysis with Reflex Microscopic; Future    Thank you very much for coming.  I do appreciate your visit, and your trust.    Thank you for keeping up with your medications.  Thank you for trying to eat more sensibly.  Avoid excessive sweets and starches.  This way, you will not need as much medication to control your blood sugar.    You are due for repeat FASTING laboratory examinations in 1 month.  You can have this done at any  facility convenient to you.  Once I have the results, I will review them, and try to determine if you will benefit from more medicine to strengthen your memory!    In the meantime, please continue taking your PAROXETINE with supper.  Likewise, please continue taking your DONEPEZIL every evening.  These medications are very good at preserving your current memory status.    Likewise, please make sure to continue taking your Centrum Silver MULTIVITAMIN.  Take this with breakfast.    Please stay physically active.  This is also very good for your memory!    Patients with DIABETES have been found to do better while on STATIN cholesterol medication, because statins do increase the chances of heart attack or stroke in diabetics.    Statins have been evaluated for possible contribution to memory, but no studies show that statins cause memory loss.  As such, please continue taking your ATORVASTATIN.    Please continue staying active, especially now that the weather is warmer.    Again, fasting laboratory examinations in 1 month, and I will send word regarding results and possible changes.    Please come back in 4 months.  Until then, let me call with results and advise.  Please call  me with questions or concerns.  Please continue to take care of yourself and your family, and please continue to pray for our recovery from this pandemic.    You will benefit from TETANUS vaccination anytime soon.  Highly recommended, to be paid for by your insurance, and good for 10 years!  The tetanus vaccination also protects you from bad sore throat DIPHTHERIA, as well as whooping cough PERTUSSIS.  Again, highly recommended, available from your local friendly pharmacy, and good for 10 years.    After 2 or 3 weeks, please get yourself vaccinated for SHINGLES.  Again, highly recommended, and available from your local friendly pharmacy.  SHINGRIX comes in a series of 2 injections at least 1 month apart.    Again, thank you very much for coming.  Let me see you in 4 months, but call or text sooner as needed.  Take care and God bless.            0  Return in 4 months.  40 minutes please.  Possible repeat FASTING laboratory examinations.  Yearly PHYSICAL examination.  Reassess mood, energy, function, preventive strategies, cardiovascular risk in patient who is gracefully experiencing Alzheimer type dementia.  Daughter seemingly coping, with patient moderately compliant.            0  Consider Namenda once repeat laboratory examinations are done.  Consider referral to neurology if with further decline in function.  Watch out for caregiver stress of daughter.            0

## 2025-05-12 ENCOUNTER — TELEPHONE (OUTPATIENT)
Dept: PRIMARY CARE | Facility: CLINIC | Age: 85
End: 2025-05-12
Payer: MEDICARE

## 2025-05-12 DIAGNOSIS — I10 ESSENTIAL HYPERTENSION: Primary | ICD-10-CM

## 2025-05-14 RX ORDER — METOPROLOL SUCCINATE 50 MG/1
50 TABLET, EXTENDED RELEASE ORAL DAILY
Qty: 90 TABLET | Refills: 0 | Status: SHIPPED | OUTPATIENT
Start: 2025-05-14

## 2025-05-28 DIAGNOSIS — I10 ESSENTIAL HYPERTENSION: ICD-10-CM

## 2025-05-28 DIAGNOSIS — D51.9 ANEMIA DUE TO VITAMIN B12 DEFICIENCY, UNSPECIFIED B12 DEFICIENCY TYPE: ICD-10-CM

## 2025-05-28 DIAGNOSIS — E11.65 TYPE 2 DIABETES MELLITUS WITH HYPERGLYCEMIA, WITHOUT LONG-TERM CURRENT USE OF INSULIN: ICD-10-CM

## 2025-05-28 DIAGNOSIS — F02.A0 MILD LATE ONSET ALZHEIMER'S DEMENTIA WITHOUT BEHAVIORAL DISTURBANCE, PSYCHOTIC DISTURBANCE, MOOD DISTURBANCE, OR ANXIETY (MULTI): ICD-10-CM

## 2025-05-28 DIAGNOSIS — E11.51 TYPE 2 DIABETES MELLITUS WITH DIABETIC PERIPHERAL ANGIOPATHY WITHOUT GANGRENE, WITHOUT LONG-TERM CURRENT USE OF INSULIN (MULTI): ICD-10-CM

## 2025-05-28 DIAGNOSIS — Z53.20 COLON CANCER SCREENING DECLINED: ICD-10-CM

## 2025-05-28 DIAGNOSIS — Z71.89 CARDIAC RISK COUNSELING: ICD-10-CM

## 2025-05-28 DIAGNOSIS — E55.9 VITAMIN D DEFICIENCY: ICD-10-CM

## 2025-05-28 DIAGNOSIS — G30.1 MILD LATE ONSET ALZHEIMER'S DEMENTIA WITHOUT BEHAVIORAL DISTURBANCE, PSYCHOTIC DISTURBANCE, MOOD DISTURBANCE, OR ANXIETY (MULTI): ICD-10-CM

## 2025-05-28 DIAGNOSIS — E78.00 PURE HYPERCHOLESTEROLEMIA WITH TARGET LOW DENSITY LIPOPROTEIN (LDL) CHOLESTEROL LESS THAN 70 MG/DL: ICD-10-CM

## 2025-06-17 ENCOUNTER — APPOINTMENT (OUTPATIENT)
Dept: CARDIOLOGY | Facility: CLINIC | Age: 85
End: 2025-06-17
Payer: MEDICARE

## 2025-06-17 VITALS
WEIGHT: 126.8 LBS | HEIGHT: 57 IN | BODY MASS INDEX: 27.36 KG/M2 | HEART RATE: 78 BPM | SYSTOLIC BLOOD PRESSURE: 118 MMHG | DIASTOLIC BLOOD PRESSURE: 60 MMHG

## 2025-06-17 DIAGNOSIS — E11.51 TYPE 2 DIABETES MELLITUS WITH DIABETIC PERIPHERAL ANGIOPATHY WITHOUT GANGRENE, WITHOUT LONG-TERM CURRENT USE OF INSULIN (MULTI): ICD-10-CM

## 2025-06-17 DIAGNOSIS — E78.2 MIXED HYPERLIPIDEMIA: ICD-10-CM

## 2025-06-17 DIAGNOSIS — R01.1 HEART MURMUR, SYSTOLIC: ICD-10-CM

## 2025-06-17 DIAGNOSIS — Z78.9 NEVER SMOKED TOBACCO: ICD-10-CM

## 2025-06-17 DIAGNOSIS — Z95.0 PRESENCE OF CARDIAC PACEMAKER: ICD-10-CM

## 2025-06-17 DIAGNOSIS — Z98.61 CAD S/P PERCUTANEOUS CORONARY ANGIOPLASTY: ICD-10-CM

## 2025-06-17 DIAGNOSIS — I10 ESSENTIAL HYPERTENSION: ICD-10-CM

## 2025-06-17 DIAGNOSIS — I34.2 NONRHEUMATIC MITRAL VALVE STENOSIS: ICD-10-CM

## 2025-06-17 DIAGNOSIS — F02.A0 MILD LATE ONSET ALZHEIMER'S DEMENTIA WITHOUT BEHAVIORAL DISTURBANCE, PSYCHOTIC DISTURBANCE, MOOD DISTURBANCE, OR ANXIETY (MULTI): ICD-10-CM

## 2025-06-17 DIAGNOSIS — I25.10 CAD S/P PERCUTANEOUS CORONARY ANGIOPLASTY: ICD-10-CM

## 2025-06-17 DIAGNOSIS — G30.1 MILD LATE ONSET ALZHEIMER'S DEMENTIA WITHOUT BEHAVIORAL DISTURBANCE, PSYCHOTIC DISTURBANCE, MOOD DISTURBANCE, OR ANXIETY (MULTI): ICD-10-CM

## 2025-06-17 PROCEDURE — 1159F MED LIST DOCD IN RCRD: CPT | Performed by: INTERNAL MEDICINE

## 2025-06-17 PROCEDURE — 3074F SYST BP LT 130 MM HG: CPT | Performed by: INTERNAL MEDICINE

## 2025-06-17 PROCEDURE — 99213 OFFICE O/P EST LOW 20 MIN: CPT | Performed by: INTERNAL MEDICINE

## 2025-06-17 PROCEDURE — 3078F DIAST BP <80 MM HG: CPT | Performed by: INTERNAL MEDICINE

## 2025-06-17 PROCEDURE — 1036F TOBACCO NON-USER: CPT | Performed by: INTERNAL MEDICINE

## 2025-06-17 RX ORDER — ATORVASTATIN CALCIUM 40 MG/1
40 TABLET, FILM COATED ORAL NIGHTLY
Qty: 90 TABLET | Refills: 3 | Status: SHIPPED | OUTPATIENT
Start: 2025-06-17 | End: 2026-06-17

## 2025-06-17 RX ORDER — METOPROLOL SUCCINATE 50 MG/1
50 TABLET, EXTENDED RELEASE ORAL DAILY
Qty: 90 TABLET | Refills: 3 | Status: SHIPPED | OUTPATIENT
Start: 2025-06-17 | End: 2026-06-17

## 2025-06-17 NOTE — PATIENT INSTRUCTIONS
Patient to follow up in 9 months with Dr. Freddy Whitmore MD [facility]     Office will refer you to the [facility] in near future. (3 month goals)     No other changes today.   Continue same medications and treatments.   Patient educated on proper medication use.   Patient educated on risk factor modification.   Please bring any lab results from other providers / physicians to your next appointment.     Please bring all medicines, vitamins, and herbal supplements with you when you come to the office.     Prescriptions will not be filled unless you are compliant with your follow up appointments or have a follow up appointment scheduled as per instruction of your physician. Refills should be requested at the time of your visit.    Daron ROB RN am scribing for and in the presence of Dr. Freddy Whitmore MD [facility]

## 2025-06-17 NOTE — PROGRESS NOTES
Referred by Dr. Tran ref. provider found provider found for   Chief Complaint   Patient presents with    Follow-up     9 month follow up on coronary artery disease, essential hypertension, and hyperlipidemia managment        History of Present Illness  Marci Han is a 85 y.o. year old female patient is here for follow-up.  Doing well from a cardiac standpoint no complaint no symptoms of chest pain or shortness of breath.  Blood pressure is adequately controlled.  Denies any symptoms of palpitations syncope or presyncope.  Discussed with the patient we will continue medication will call for any problem and follow-up as scheduled    Past Medical History  Medical History[1]    Social History  Social History[2]    Family History   Family History[3]    Review of Systems  As per HPI, all other systems reviewed and negative.    Allergies:  RX Allergies[4]     Outpatient Medications:  Current Outpatient Medications   Medication Instructions    aspirin 81 mg chewable tablet 1 tablet, Daily    atorvastatin (Lipitor) 40 mg tablet 1 tablet, Nightly    blood sugar diagnostic (Accu-Chek Lucila Plus test strp) strip Daily RT    cholecalciferol (Vitamin D-3) 25 MCG (1000 UT) tablet Take by mouth.    cranberry fruit 500 mg tablet,chewable Chew.    cyanocobalamin (Vitamin B-12) 100 mcg tablet 1 tablet, Daily    donepezil (ARICEPT) 10 mg, oral, Nightly    glimepiride (Amaryl) 2 mg tablet Please take 1 tablet by mouth before biggest meal of the day.  Do not take if you are not going to eat.  Thank you.    lancets misc Test 1 time daily    lisinopril 10 mg tablet Please take only 1 tablet by mouth every day.  Thank you.    metoprolol succinate XL (TOPROL-XL) 50 mg, oral, Daily    multivitamin with minerals iron-free (Centrum Silver) 1 tablet, Daily    PARoxetine (Paxil) 10 mg tablet PLEASE TAKE 1 TABLET BY MOUTH IN THE EVENING WITH SUPPER. THANK YOU.    pioglitazone (ACTOS) 30 mg, oral, Daily         Vitals:  Vitals:    06/17/25 1038    BP: 118/60   Pulse: 78       Physical Exam:  Physical Exam  Vitals and nursing note reviewed.   Constitutional:       Appearance: Normal appearance. She is normal weight.   HENT:      Head: Normocephalic and atraumatic.   Eyes:      Extraocular Movements: Extraocular movements intact.      Pupils: Pupils are equal, round, and reactive to light.   Cardiovascular:      Rate and Rhythm: Normal rate and regular rhythm.      Pulses: Normal pulses.      Comments: Left sided device pocket- healed and well approximated. No lump or hematoma    Pulmonary:      Effort: Pulmonary effort is normal.      Breath sounds: Normal breath sounds.   Musculoskeletal:      Cervical back: Normal range of motion.      Right lower leg: No edema.      Left lower leg: No edema.   Skin:     General: Skin is warm and dry.   Neurological:      General: No focal deficit present.      Mental Status: She is alert and oriented to person, place, and time.             Assessment/Plan   Diagnoses and all orders for this visit:  Essential hypertension  Mixed hyperlipidemia  CAD S/P percutaneous coronary angioplasty  Heart murmur, systolic  Nonrheumatic mitral valve stenosis  Presence of cardiac pacemaker  BMI 27.0-27.9,adult  Type 2 diabetes mellitus with diabetic peripheral angiopathy without gangrene, without long-term current use of insulin (Multi)  Mild late onset Alzheimer's dementia without behavioral disturbance, psychotic disturbance, mood disturbance, or anxiety (Multi)  Never smoked tobacco      IDaron RN   am scribing for, and in the presence [facility] .    I, Dr. Freddy Whitmore MD [facility] , personally performed the services described in the documentation as scribed by Daron Maciel RN   in my presence, and confirm it is both accurate and complete.      Freddy Whitmore MD FACC  [facility]   [facility]     Thank you for allowing me to participate in the care of this patient. Please  do not hesitate to contact me with any further questions or concerns.         [1]   Past Medical History:  Diagnosis Date    Encounter for general adult medical examination without abnormal findings     Health care maintenance    Personal history of diseases of the skin and subcutaneous tissue     History of eczema    Personal history of other diseases of the musculoskeletal system and connective tissue     History of bursitis    Personal history of other diseases of the nervous system and sense organs     History of eye pain    Personal history of other diseases of urinary system     History of hematuria    Personal history of other endocrine, nutritional and metabolic disease     History of vitamin D deficiency    Personal history of other infectious and parasitic diseases     History of candidiasis    Personal history of other specified conditions     History of chronic pain    Primary osteoarthritis, unspecified ankle and foot     Primary osteoarthritis of foot, unspecified laterality    Unspecified osteoarthritis, unspecified site 07/16/2019    Osteoarthritis    Urinary tract infection, site not specified     Bacterial UTI   [2]   Social History  Tobacco Use    Smoking status: Never    Smokeless tobacco: Never   Substance Use Topics    Alcohol use: Not Currently    Drug use: Never   [3]   Family History  Problem Relation Name Age of Onset    Diabetes Mother      Hypertension Mother      Diabetes Father      Hypertension Father      Heart attack Father     [4]   Allergies  Allergen Reactions    Iodinated Contrast Media Unknown    Lovastatin Unknown    Meloxicam Unknown    Naproxen Unknown    Pravastatin Unknown    Shellfish Containing Products Unknown    Shellfish Derived Hives

## 2025-08-01 DIAGNOSIS — E11.51 TYPE 2 DIABETES MELLITUS WITH DIABETIC PERIPHERAL ANGIOPATHY WITHOUT GANGRENE, WITHOUT LONG-TERM CURRENT USE OF INSULIN (MULTI): ICD-10-CM

## 2025-08-01 DIAGNOSIS — I10 ESSENTIAL HYPERTENSION: ICD-10-CM

## 2025-08-01 DIAGNOSIS — E11.65 TYPE 2 DIABETES MELLITUS WITH HYPERGLYCEMIA, WITHOUT LONG-TERM CURRENT USE OF INSULIN: ICD-10-CM

## 2025-08-01 RX ORDER — METOPROLOL SUCCINATE 50 MG/1
50 TABLET, EXTENDED RELEASE ORAL DAILY
Qty: 90 TABLET | Refills: 3 | Status: SHIPPED | OUTPATIENT
Start: 2025-08-01 | End: 2026-08-01

## 2025-08-01 NOTE — TELEPHONE ENCOUNTER
Received request for prescription refills for patient.   Patient follows with Dr. Whitmore    Request is for metoprolol succinate  Is patient currently on medication yes    Last OV 6/17/2025  Next OV 3/17/2026    Pended for signing and sent to provider

## 2025-08-04 RX ORDER — GLIMEPIRIDE 2 MG/1
TABLET ORAL
Qty: 90 TABLET | Refills: 0 | Status: SHIPPED | OUTPATIENT
Start: 2025-08-04

## 2025-08-07 ENCOUNTER — HOSPITAL ENCOUNTER (OUTPATIENT)
Dept: CARDIOLOGY | Age: 85
Discharge: HOME | End: 2025-08-07
Payer: MEDICARE

## 2025-08-07 PROCEDURE — 93296 REM INTERROG EVL PM/IDS: CPT

## 2025-08-08 ENCOUNTER — HOSPITAL ENCOUNTER (OUTPATIENT)
Dept: CARDIOLOGY | Age: 85
Discharge: HOME | End: 2025-08-08

## 2025-08-08 DIAGNOSIS — I10 ESSENTIAL HYPERTENSION: ICD-10-CM

## 2025-08-08 DIAGNOSIS — E11.65 TYPE 2 DIABETES MELLITUS WITH HYPERGLYCEMIA, WITHOUT LONG-TERM CURRENT USE OF INSULIN: ICD-10-CM

## 2025-08-08 RX ORDER — LISINOPRIL 10 MG/1
TABLET ORAL
Qty: 90 TABLET | Refills: 1 | Status: SHIPPED | OUTPATIENT
Start: 2025-08-08

## 2025-08-08 RX ORDER — PIOGLITAZONE 30 MG/1
30 TABLET ORAL DAILY
Qty: 90 TABLET | Refills: 0 | Status: SHIPPED | OUTPATIENT
Start: 2025-08-08 | End: 2025-11-06

## 2025-08-11 DIAGNOSIS — I10 ESSENTIAL HYPERTENSION: ICD-10-CM

## 2025-08-11 DIAGNOSIS — F02.A0 MILD LATE ONSET ALZHEIMER'S DEMENTIA WITHOUT BEHAVIORAL DISTURBANCE, PSYCHOTIC DISTURBANCE, MOOD DISTURBANCE, OR ANXIETY (MULTI): ICD-10-CM

## 2025-08-11 DIAGNOSIS — G30.1 MILD LATE ONSET ALZHEIMER'S DEMENTIA WITHOUT BEHAVIORAL DISTURBANCE, PSYCHOTIC DISTURBANCE, MOOD DISTURBANCE, OR ANXIETY (MULTI): ICD-10-CM

## 2025-08-11 DIAGNOSIS — E11.65 TYPE 2 DIABETES MELLITUS WITH HYPERGLYCEMIA, WITHOUT LONG-TERM CURRENT USE OF INSULIN: ICD-10-CM

## 2025-08-12 RX ORDER — LISINOPRIL 10 MG/1
TABLET ORAL
Qty: 90 TABLET | Refills: 1 | Status: SHIPPED | OUTPATIENT
Start: 2025-08-12

## 2025-08-12 RX ORDER — DONEPEZIL HYDROCHLORIDE 10 MG/1
10 TABLET, FILM COATED ORAL NIGHTLY
Qty: 90 TABLET | Refills: 1 | Status: SHIPPED | OUTPATIENT
Start: 2025-08-12 | End: 2026-02-08

## 2025-08-13 ENCOUNTER — APPOINTMENT (OUTPATIENT)
Dept: CARDIOLOGY | Facility: HOSPITAL | Age: 85
End: 2025-08-13
Payer: MEDICARE

## 2025-08-13 ENCOUNTER — HOSPITAL ENCOUNTER (OUTPATIENT)
Facility: HOSPITAL | Age: 85
Discharge: HOME | End: 2025-08-14
Attending: EMERGENCY MEDICINE | Admitting: STUDENT IN AN ORGANIZED HEALTH CARE EDUCATION/TRAINING PROGRAM
Payer: MEDICARE

## 2025-08-13 ENCOUNTER — APPOINTMENT (OUTPATIENT)
Dept: RADIOLOGY | Facility: HOSPITAL | Age: 85
End: 2025-08-13
Payer: MEDICARE

## 2025-08-13 ENCOUNTER — OFFICE VISIT (OUTPATIENT)
Dept: PRIMARY CARE | Facility: CLINIC | Age: 85
End: 2025-08-13
Payer: MEDICARE

## 2025-08-13 VITALS
DIASTOLIC BLOOD PRESSURE: 64 MMHG | HEART RATE: 74 BPM | SYSTOLIC BLOOD PRESSURE: 116 MMHG | WEIGHT: 125 LBS | TEMPERATURE: 100.5 F | OXYGEN SATURATION: 93 % | BODY MASS INDEX: 27.05 KG/M2 | RESPIRATION RATE: 18 BRPM

## 2025-08-13 DIAGNOSIS — U07.1 COVID: ICD-10-CM

## 2025-08-13 DIAGNOSIS — R26.0 ATAXIC GAIT: ICD-10-CM

## 2025-08-13 DIAGNOSIS — R79.89 ELEVATED TROPONIN: Primary | ICD-10-CM

## 2025-08-13 DIAGNOSIS — Z98.61 CAD S/P PERCUTANEOUS CORONARY ANGIOPLASTY: ICD-10-CM

## 2025-08-13 DIAGNOSIS — R53.81 MALAISE AND FATIGUE: ICD-10-CM

## 2025-08-13 DIAGNOSIS — F41.8 ANXIETY WITH DEPRESSION: ICD-10-CM

## 2025-08-13 DIAGNOSIS — R05.1 ACUTE COUGH: ICD-10-CM

## 2025-08-13 DIAGNOSIS — R53.83 MALAISE AND FATIGUE: ICD-10-CM

## 2025-08-13 DIAGNOSIS — R74.8 CARDIAC ENZYMES ELEVATED: ICD-10-CM

## 2025-08-13 DIAGNOSIS — I25.10 CAD S/P PERCUTANEOUS CORONARY ANGIOPLASTY: ICD-10-CM

## 2025-08-13 PROBLEM — J06.9 UPPER RESPIRATORY TRACT INFECTION DUE TO COVID-19 VIRUS: Status: ACTIVE | Noted: 2025-08-13

## 2025-08-13 PROBLEM — E11.65 TYPE 2 DIABETES MELLITUS WITH HYPERGLYCEMIA, WITHOUT LONG-TERM CURRENT USE OF INSULIN: Status: ACTIVE | Noted: 2023-03-20

## 2025-08-13 LAB
ALBUMIN SERPL BCP-MCNC: 3.8 G/DL (ref 3.4–5)
ALP SERPL-CCNC: 57 U/L (ref 33–136)
ALT SERPL W P-5'-P-CCNC: 23 U/L (ref 7–45)
ANION GAP SERPL CALC-SCNC: 10 MMOL/L (ref 10–20)
APPEARANCE UR: CLEAR
AST SERPL W P-5'-P-CCNC: 53 U/L (ref 9–39)
ATRIAL RATE: 70 BPM
ATRIAL RATE: 70 BPM
BASOPHILS # BLD AUTO: 0.02 X10*3/UL (ref 0–0.1)
BASOPHILS NFR BLD AUTO: 0.3 %
BILIRUB SERPL-MCNC: 0.5 MG/DL (ref 0–1.2)
BILIRUB UR STRIP.AUTO-MCNC: NEGATIVE MG/DL
BNP SERPL-MCNC: 282 PG/ML (ref 0–99)
BUN SERPL-MCNC: 16 MG/DL (ref 6–23)
CALCIUM SERPL-MCNC: 9.2 MG/DL (ref 8.6–10.3)
CARDIAC TROPONIN I PNL SERPL HS: 122 NG/L (ref 0–13)
CARDIAC TROPONIN I PNL SERPL HS: 127 NG/L (ref 0–13)
CHLORIDE SERPL-SCNC: 102 MMOL/L (ref 98–107)
CO2 SERPL-SCNC: 27 MMOL/L (ref 21–32)
COLOR UR: YELLOW
CREAT SERPL-MCNC: 0.71 MG/DL (ref 0.5–1.05)
D DIMER PPP FEU-MCNC: 614 NG/ML FEU
EGFRCR SERPLBLD CKD-EPI 2021: 83 ML/MIN/1.73M*2
EOSINOPHIL # BLD AUTO: 0.01 X10*3/UL (ref 0–0.4)
EOSINOPHIL NFR BLD AUTO: 0.1 %
ERYTHROCYTE [DISTWIDTH] IN BLOOD BY AUTOMATED COUNT: 18.5 % (ref 11.5–14.5)
GLUCOSE BLD MANUAL STRIP-MCNC: 200 MG/DL (ref 74–99)
GLUCOSE SERPL-MCNC: 198 MG/DL (ref 74–99)
GLUCOSE UR STRIP.AUTO-MCNC: NORMAL MG/DL
HCT VFR BLD AUTO: 32.8 % (ref 36–46)
HGB BLD-MCNC: 10.6 G/DL (ref 12–16)
IMM GRANULOCYTES # BLD AUTO: 0.03 X10*3/UL (ref 0–0.5)
IMM GRANULOCYTES NFR BLD AUTO: 0.4 % (ref 0–0.9)
KETONES UR STRIP.AUTO-MCNC: NEGATIVE MG/DL
LEUKOCYTE ESTERASE UR QL STRIP.AUTO: NEGATIVE
LIPASE SERPL-CCNC: 42 U/L (ref 9–82)
LYMPHOCYTES # BLD AUTO: 1.13 X10*3/UL (ref 0.8–3)
LYMPHOCYTES NFR BLD AUTO: 15.5 %
MAGNESIUM SERPL-MCNC: 1.68 MG/DL (ref 1.6–2.4)
MCH RBC QN AUTO: 29 PG (ref 26–34)
MCHC RBC AUTO-ENTMCNC: 32.3 G/DL (ref 32–36)
MCV RBC AUTO: 90 FL (ref 80–100)
MONOCYTES # BLD AUTO: 0.61 X10*3/UL (ref 0.05–0.8)
MONOCYTES NFR BLD AUTO: 8.4 %
NEUTROPHILS # BLD AUTO: 5.5 X10*3/UL (ref 1.6–5.5)
NEUTROPHILS NFR BLD AUTO: 75.3 %
NITRITE UR QL STRIP.AUTO: NEGATIVE
NRBC BLD-RTO: 0 /100 WBCS (ref 0–0)
P AXIS: 37 DEGREES
P AXIS: 46 DEGREES
P OFFSET: 126 MS
P OFFSET: 161 MS
P ONSET: 104 MS
P ONSET: 86 MS
PH UR STRIP.AUTO: 5.5 [PH]
PLATELET # BLD AUTO: 121 X10*3/UL (ref 150–450)
POC SARS-COV-2 AG BINAX: ABNORMAL
POTASSIUM SERPL-SCNC: 4.8 MMOL/L (ref 3.5–5.3)
PR INTERVAL: 184 MS
PROT SERPL-MCNC: 7.2 G/DL (ref 6.4–8.2)
PROT UR STRIP.AUTO-MCNC: ABNORMAL MG/DL
Q ONSET: 173 MS
Q ONSET: 196 MS
QRS COUNT: 11 BEATS
QRS COUNT: 13 BEATS
QRS DURATION: 140 MS
QRS DURATION: 152 MS
QT INTERVAL: 438 MS
QT INTERVAL: 440 MS
QTC CALCULATION(BAZETT): 473 MS
QTC CALCULATION(BAZETT): 504 MS
QTC FREDERICIA: 461 MS
QTC FREDERICIA: 482 MS
R AXIS: -64 DEGREES
R AXIS: -66 DEGREES
RBC # BLD AUTO: 3.66 X10*6/UL (ref 4–5.2)
RBC # UR STRIP.AUTO: ABNORMAL MG/DL
RBC #/AREA URNS AUTO: NORMAL /HPF
SODIUM SERPL-SCNC: 134 MMOL/L (ref 136–145)
SP GR UR STRIP.AUTO: 1.03
SQUAMOUS #/AREA URNS AUTO: NORMAL /HPF
T AXIS: 65 DEGREES
T AXIS: 74 DEGREES
T OFFSET: 393 MS
T OFFSET: 415 MS
UROBILINOGEN UR STRIP.AUTO-MCNC: NORMAL MG/DL
VENTRICULAR RATE: 70 BPM
VENTRICULAR RATE: 79 BPM
WBC # BLD AUTO: 7.3 X10*3/UL (ref 4.4–11.3)
WBC #/AREA URNS AUTO: NORMAL /HPF

## 2025-08-13 PROCEDURE — 71045 X-RAY EXAM CHEST 1 VIEW: CPT

## 2025-08-13 PROCEDURE — 85379 FIBRIN DEGRADATION QUANT: CPT | Performed by: EMERGENCY MEDICINE

## 2025-08-13 PROCEDURE — 83036 HEMOGLOBIN GLYCOSYLATED A1C: CPT | Mod: ELYLAB | Performed by: STUDENT IN AN ORGANIZED HEALTH CARE EDUCATION/TRAINING PROGRAM

## 2025-08-13 PROCEDURE — 96361 HYDRATE IV INFUSION ADD-ON: CPT

## 2025-08-13 PROCEDURE — 2500000001 HC RX 250 WO HCPCS SELF ADMINISTERED DRUGS (ALT 637 FOR MEDICARE OP): Performed by: STUDENT IN AN ORGANIZED HEALTH CARE EDUCATION/TRAINING PROGRAM

## 2025-08-13 PROCEDURE — 80053 COMPREHEN METABOLIC PANEL: CPT | Performed by: EMERGENCY MEDICINE

## 2025-08-13 PROCEDURE — 84484 ASSAY OF TROPONIN QUANT: CPT | Performed by: EMERGENCY MEDICINE

## 2025-08-13 PROCEDURE — 83735 ASSAY OF MAGNESIUM: CPT | Performed by: EMERGENCY MEDICINE

## 2025-08-13 PROCEDURE — 99222 1ST HOSP IP/OBS MODERATE 55: CPT | Performed by: STUDENT IN AN ORGANIZED HEALTH CARE EDUCATION/TRAINING PROGRAM

## 2025-08-13 PROCEDURE — 1200000002 HC GENERAL ROOM WITH TELEMETRY DAILY

## 2025-08-13 PROCEDURE — 83690 ASSAY OF LIPASE: CPT | Performed by: EMERGENCY MEDICINE

## 2025-08-13 PROCEDURE — 87811 SARS-COV-2 COVID19 W/OPTIC: CPT | Performed by: NURSE PRACTITIONER

## 2025-08-13 PROCEDURE — 1159F MED LIST DOCD IN RCRD: CPT | Performed by: NURSE PRACTITIONER

## 2025-08-13 PROCEDURE — 1036F TOBACCO NON-USER: CPT | Performed by: NURSE PRACTITIONER

## 2025-08-13 PROCEDURE — 2500000004 HC RX 250 GENERAL PHARMACY W/ HCPCS (ALT 636 FOR OP/ED): Performed by: EMERGENCY MEDICINE

## 2025-08-13 PROCEDURE — 2500000004 HC RX 250 GENERAL PHARMACY W/ HCPCS (ALT 636 FOR OP/ED): Performed by: STUDENT IN AN ORGANIZED HEALTH CARE EDUCATION/TRAINING PROGRAM

## 2025-08-13 PROCEDURE — 3074F SYST BP LT 130 MM HG: CPT | Performed by: NURSE PRACTITIONER

## 2025-08-13 PROCEDURE — 81001 URINALYSIS AUTO W/SCOPE: CPT | Performed by: EMERGENCY MEDICINE

## 2025-08-13 PROCEDURE — 2500000002 HC RX 250 W HCPCS SELF ADMINISTERED DRUGS (ALT 637 FOR MEDICARE OP, ALT 636 FOR OP/ED): Performed by: STUDENT IN AN ORGANIZED HEALTH CARE EDUCATION/TRAINING PROGRAM

## 2025-08-13 PROCEDURE — 71045 X-RAY EXAM CHEST 1 VIEW: CPT | Performed by: RADIOLOGY

## 2025-08-13 PROCEDURE — 99285 EMERGENCY DEPT VISIT HI MDM: CPT | Mod: 25 | Performed by: EMERGENCY MEDICINE

## 2025-08-13 PROCEDURE — 2500000001 HC RX 250 WO HCPCS SELF ADMINISTERED DRUGS (ALT 637 FOR MEDICARE OP): Performed by: EMERGENCY MEDICINE

## 2025-08-13 PROCEDURE — 85025 COMPLETE CBC W/AUTO DIFF WBC: CPT | Performed by: EMERGENCY MEDICINE

## 2025-08-13 PROCEDURE — 96360 HYDRATION IV INFUSION INIT: CPT

## 2025-08-13 PROCEDURE — 82947 ASSAY GLUCOSE BLOOD QUANT: CPT | Mod: 59

## 2025-08-13 PROCEDURE — 36415 COLL VENOUS BLD VENIPUNCTURE: CPT | Performed by: EMERGENCY MEDICINE

## 2025-08-13 PROCEDURE — 1160F RVW MEDS BY RX/DR IN RCRD: CPT | Performed by: NURSE PRACTITIONER

## 2025-08-13 PROCEDURE — 87635 SARS-COV-2 COVID-19 AMP PRB: CPT | Performed by: STUDENT IN AN ORGANIZED HEALTH CARE EDUCATION/TRAINING PROGRAM

## 2025-08-13 PROCEDURE — 93005 ELECTROCARDIOGRAM TRACING: CPT

## 2025-08-13 PROCEDURE — 83880 ASSAY OF NATRIURETIC PEPTIDE: CPT | Performed by: EMERGENCY MEDICINE

## 2025-08-13 PROCEDURE — 99213 OFFICE O/P EST LOW 20 MIN: CPT | Performed by: NURSE PRACTITIONER

## 2025-08-13 PROCEDURE — 3078F DIAST BP <80 MM HG: CPT | Performed by: NURSE PRACTITIONER

## 2025-08-13 RX ORDER — DONEPEZIL HYDROCHLORIDE 5 MG/1
10 TABLET, FILM COATED ORAL NIGHTLY
Status: DISCONTINUED | OUTPATIENT
Start: 2025-08-13 | End: 2025-08-14 | Stop reason: HOSPADM

## 2025-08-13 RX ORDER — BENZONATATE 100 MG/1
100 CAPSULE ORAL 3 TIMES DAILY PRN
Status: DISCONTINUED | OUTPATIENT
Start: 2025-08-13 | End: 2025-08-14 | Stop reason: HOSPADM

## 2025-08-13 RX ORDER — GUAIFENESIN 600 MG/1
600 TABLET, EXTENDED RELEASE ORAL 2 TIMES DAILY
Status: DISCONTINUED | OUTPATIENT
Start: 2025-08-13 | End: 2025-08-14 | Stop reason: HOSPADM

## 2025-08-13 RX ORDER — ATORVASTATIN CALCIUM 20 MG/1
40 TABLET, FILM COATED ORAL NIGHTLY
Status: DISCONTINUED | OUTPATIENT
Start: 2025-08-13 | End: 2025-08-14 | Stop reason: HOSPADM

## 2025-08-13 RX ORDER — NAPROXEN SODIUM 220 MG/1
81 TABLET, FILM COATED ORAL DAILY
Status: DISCONTINUED | OUTPATIENT
Start: 2025-08-14 | End: 2025-08-14 | Stop reason: HOSPADM

## 2025-08-13 RX ORDER — TALC
3 POWDER (GRAM) TOPICAL NIGHTLY PRN
Status: DISCONTINUED | OUTPATIENT
Start: 2025-08-13 | End: 2025-08-14 | Stop reason: HOSPADM

## 2025-08-13 RX ORDER — ACETAMINOPHEN 160 MG/5ML
650 SOLUTION ORAL EVERY 4 HOURS PRN
Status: DISCONTINUED | OUTPATIENT
Start: 2025-08-13 | End: 2025-08-14 | Stop reason: HOSPADM

## 2025-08-13 RX ORDER — DEXTROSE 50 % IN WATER (D50W) INTRAVENOUS SYRINGE
25
Status: DISCONTINUED | OUTPATIENT
Start: 2025-08-13 | End: 2025-08-14 | Stop reason: HOSPADM

## 2025-08-13 RX ORDER — INSULIN LISPRO 100 [IU]/ML
0-5 INJECTION, SOLUTION INTRAVENOUS; SUBCUTANEOUS
Status: DISCONTINUED | OUTPATIENT
Start: 2025-08-13 | End: 2025-08-14 | Stop reason: HOSPADM

## 2025-08-13 RX ORDER — DEXTROSE 50 % IN WATER (D50W) INTRAVENOUS SYRINGE
12.5
Status: DISCONTINUED | OUTPATIENT
Start: 2025-08-13 | End: 2025-08-14 | Stop reason: HOSPADM

## 2025-08-13 RX ORDER — ONDANSETRON HYDROCHLORIDE 2 MG/ML
4 INJECTION, SOLUTION INTRAVENOUS EVERY 8 HOURS PRN
Status: DISCONTINUED | OUTPATIENT
Start: 2025-08-13 | End: 2025-08-14 | Stop reason: HOSPADM

## 2025-08-13 RX ORDER — ONDANSETRON 4 MG/1
4 TABLET, FILM COATED ORAL EVERY 8 HOURS PRN
Status: DISCONTINUED | OUTPATIENT
Start: 2025-08-13 | End: 2025-08-14 | Stop reason: HOSPADM

## 2025-08-13 RX ORDER — POLYETHYLENE GLYCOL 3350 17 G/17G
17 POWDER, FOR SOLUTION ORAL DAILY
Status: DISCONTINUED | OUTPATIENT
Start: 2025-08-14 | End: 2025-08-14 | Stop reason: HOSPADM

## 2025-08-13 RX ORDER — LISINOPRIL 10 MG/1
10 TABLET ORAL DAILY
Status: DISCONTINUED | OUTPATIENT
Start: 2025-08-14 | End: 2025-08-14 | Stop reason: HOSPADM

## 2025-08-13 RX ORDER — ENOXAPARIN SODIUM 100 MG/ML
40 INJECTION SUBCUTANEOUS EVERY 24 HOURS
Status: DISCONTINUED | OUTPATIENT
Start: 2025-08-13 | End: 2025-08-14 | Stop reason: HOSPADM

## 2025-08-13 RX ORDER — METOPROLOL SUCCINATE 50 MG/1
50 TABLET, EXTENDED RELEASE ORAL DAILY
Status: DISCONTINUED | OUTPATIENT
Start: 2025-08-14 | End: 2025-08-14 | Stop reason: HOSPADM

## 2025-08-13 RX ORDER — ACETAMINOPHEN 325 MG/1
650 TABLET ORAL EVERY 4 HOURS PRN
Status: DISCONTINUED | OUTPATIENT
Start: 2025-08-13 | End: 2025-08-14 | Stop reason: HOSPADM

## 2025-08-13 RX ORDER — NAPROXEN SODIUM 220 MG/1
324 TABLET, FILM COATED ORAL ONCE
Status: COMPLETED | OUTPATIENT
Start: 2025-08-13 | End: 2025-08-13

## 2025-08-13 RX ORDER — ACETAMINOPHEN 650 MG/1
650 SUPPOSITORY RECTAL EVERY 4 HOURS PRN
Status: DISCONTINUED | OUTPATIENT
Start: 2025-08-13 | End: 2025-08-14 | Stop reason: HOSPADM

## 2025-08-13 RX ADMIN — ATORVASTATIN CALCIUM 40 MG: 20 TABLET, FILM COATED ORAL at 22:16

## 2025-08-13 RX ADMIN — ASPIRIN 324 MG: 81 TABLET, CHEWABLE ORAL at 17:12

## 2025-08-13 RX ADMIN — ENOXAPARIN SODIUM 40 MG: 100 INJECTION SUBCUTANEOUS at 22:20

## 2025-08-13 RX ADMIN — GUAIFENESIN 600 MG: 600 TABLET, EXTENDED RELEASE ORAL at 22:20

## 2025-08-13 RX ADMIN — DONEPEZIL HYDROCHLORIDE 10 MG: 5 TABLET ORAL at 22:24

## 2025-08-13 RX ADMIN — SODIUM CHLORIDE 500 ML: 0.9 INJECTION, SOLUTION INTRAVENOUS at 17:12

## 2025-08-13 RX ADMIN — INSULIN LISPRO 1 UNITS: 100 INJECTION, SOLUTION INTRAVENOUS; SUBCUTANEOUS at 22:30

## 2025-08-13 SDOH — SOCIAL STABILITY: SOCIAL INSECURITY
WITHIN THE LAST YEAR, HAVE YOU BEEN KICKED, HIT, SLAPPED, OR OTHERWISE PHYSICALLY HURT BY YOUR PARTNER OR EX-PARTNER?: NO

## 2025-08-13 SDOH — ECONOMIC STABILITY: FOOD INSECURITY: WITHIN THE PAST 12 MONTHS, YOU WORRIED THAT YOUR FOOD WOULD RUN OUT BEFORE YOU GOT THE MONEY TO BUY MORE.: NEVER TRUE

## 2025-08-13 SDOH — SOCIAL STABILITY: SOCIAL INSECURITY: WITHIN THE LAST YEAR, HAVE YOU BEEN AFRAID OF YOUR PARTNER OR EX-PARTNER?: NO

## 2025-08-13 SDOH — SOCIAL STABILITY: SOCIAL INSECURITY: HAS ANYONE EVER THREATENED TO HURT YOUR FAMILY OR YOUR PETS?: NO

## 2025-08-13 SDOH — ECONOMIC STABILITY: FOOD INSECURITY: WITHIN THE PAST 12 MONTHS, THE FOOD YOU BOUGHT JUST DIDN'T LAST AND YOU DIDN'T HAVE MONEY TO GET MORE.: NEVER TRUE

## 2025-08-13 SDOH — ECONOMIC STABILITY: FOOD INSECURITY: HOW HARD IS IT FOR YOU TO PAY FOR THE VERY BASICS LIKE FOOD, HOUSING, MEDICAL CARE, AND HEATING?: NOT HARD AT ALL

## 2025-08-13 SDOH — SOCIAL STABILITY: SOCIAL INSECURITY: HAVE YOU HAD ANY THOUGHTS OF HARMING ANYONE ELSE?: NO

## 2025-08-13 SDOH — ECONOMIC STABILITY: INCOME INSECURITY: IN THE PAST 12 MONTHS HAS THE ELECTRIC, GAS, OIL, OR WATER COMPANY THREATENED TO SHUT OFF SERVICES IN YOUR HOME?: NO

## 2025-08-13 SDOH — SOCIAL STABILITY: SOCIAL INSECURITY: WITHIN THE LAST YEAR, HAVE YOU BEEN HUMILIATED OR EMOTIONALLY ABUSED IN OTHER WAYS BY YOUR PARTNER OR EX-PARTNER?: NO

## 2025-08-13 SDOH — ECONOMIC STABILITY: HOUSING INSECURITY: AT ANY TIME IN THE PAST 12 MONTHS, WERE YOU HOMELESS OR LIVING IN A SHELTER (INCLUDING NOW)?: NO

## 2025-08-13 SDOH — SOCIAL STABILITY: SOCIAL INSECURITY: ARE YOU OR HAVE YOU BEEN THREATENED OR ABUSED PHYSICALLY, EMOTIONALLY, OR SEXUALLY BY ANYONE?: NO

## 2025-08-13 SDOH — ECONOMIC STABILITY: HOUSING INSECURITY: IN THE LAST 12 MONTHS, WAS THERE A TIME WHEN YOU WERE NOT ABLE TO PAY THE MORTGAGE OR RENT ON TIME?: NO

## 2025-08-13 SDOH — SOCIAL STABILITY: SOCIAL INSECURITY: DOES ANYONE TRY TO KEEP YOU FROM HAVING/CONTACTING OTHER FRIENDS OR DOING THINGS OUTSIDE YOUR HOME?: NO

## 2025-08-13 SDOH — SOCIAL STABILITY: SOCIAL INSECURITY
WITHIN THE LAST YEAR, HAVE YOU BEEN RAPED OR FORCED TO HAVE ANY KIND OF SEXUAL ACTIVITY BY YOUR PARTNER OR EX-PARTNER?: NO

## 2025-08-13 SDOH — ECONOMIC STABILITY: TRANSPORTATION INSECURITY: IN THE PAST 12 MONTHS, HAS LACK OF TRANSPORTATION KEPT YOU FROM MEDICAL APPOINTMENTS OR FROM GETTING MEDICATIONS?: NO

## 2025-08-13 SDOH — SOCIAL STABILITY: SOCIAL INSECURITY: ABUSE: ADULT

## 2025-08-13 SDOH — ECONOMIC STABILITY: HOUSING INSECURITY: IN THE PAST 12 MONTHS, HOW MANY TIMES HAVE YOU MOVED WHERE YOU WERE LIVING?: 0

## 2025-08-13 SDOH — SOCIAL STABILITY: SOCIAL INSECURITY: DO YOU FEEL ANYONE HAS EXPLOITED OR TAKEN ADVANTAGE OF YOU FINANCIALLY OR OF YOUR PERSONAL PROPERTY?: NO

## 2025-08-13 SDOH — SOCIAL STABILITY: SOCIAL INSECURITY: HAVE YOU HAD THOUGHTS OF HARMING ANYONE ELSE?: NO

## 2025-08-13 SDOH — SOCIAL STABILITY: SOCIAL INSECURITY: WERE YOU ABLE TO COMPLETE ALL THE BEHAVIORAL HEALTH SCREENINGS?: YES

## 2025-08-13 SDOH — SOCIAL STABILITY: SOCIAL INSECURITY: DO YOU FEEL UNSAFE GOING BACK TO THE PLACE WHERE YOU ARE LIVING?: NO

## 2025-08-13 SDOH — SOCIAL STABILITY: SOCIAL INSECURITY: ARE THERE ANY APPARENT SIGNS OF INJURIES/BEHAVIORS THAT COULD BE RELATED TO ABUSE/NEGLECT?: NO

## 2025-08-13 ASSESSMENT — PAIN SCALES - GENERAL
PAINLEVEL_OUTOF10: 0 - NO PAIN
PAINLEVEL_OUTOF10: 0 - NO PAIN

## 2025-08-13 ASSESSMENT — ENCOUNTER SYMPTOMS
CHILLS: 1
NAUSEA: 0
APPETITE CHANGE: 0
VOMITING: 0
DIARRHEA: 0
RHINORRHEA: 1
HEADACHES: 0
SLEEP DISTURBANCE: 1
COUGH: 1
ACTIVITY CHANGE: 1
SORE THROAT: 1
FEVER: 1

## 2025-08-13 ASSESSMENT — ACTIVITIES OF DAILY LIVING (ADL)
ADEQUATE_TO_COMPLETE_ADL: YES
TOILETING: INDEPENDENT
LACK_OF_TRANSPORTATION: NO
PATIENT'S MEMORY ADEQUATE TO SAFELY COMPLETE DAILY ACTIVITIES?: YES
HEARING - LEFT EAR: FUNCTIONAL
JUDGMENT_ADEQUATE_SAFELY_COMPLETE_DAILY_ACTIVITIES: YES
WALKS IN HOME: INDEPENDENT
DRESSING YOURSELF: INDEPENDENT
GROOMING: INDEPENDENT
HEARING - RIGHT EAR: FUNCTIONAL
BATHING: INDEPENDENT
LACK_OF_TRANSPORTATION: NO
FEEDING YOURSELF: INDEPENDENT

## 2025-08-13 ASSESSMENT — COGNITIVE AND FUNCTIONAL STATUS - GENERAL
HELP NEEDED FOR BATHING: A LITTLE
WALKING IN HOSPITAL ROOM: A LITTLE
MOVING TO AND FROM BED TO CHAIR: A LITTLE
DRESSING REGULAR LOWER BODY CLOTHING: A LITTLE
DAILY ACTIVITIY SCORE: 20
DRESSING REGULAR UPPER BODY CLOTHING: A LITTLE
MOBILITY SCORE: 19
STANDING UP FROM CHAIR USING ARMS: A LITTLE
TOILETING: A LITTLE
CLIMB 3 TO 5 STEPS WITH RAILING: A LOT
PATIENT BASELINE BEDBOUND: NO

## 2025-08-13 ASSESSMENT — LIFESTYLE VARIABLES
AUDIT-C TOTAL SCORE: 0
SKIP TO QUESTIONS 9-10: 1
HAVE YOU EVER FELT YOU SHOULD CUT DOWN ON YOUR DRINKING: NO
HOW OFTEN DO YOU HAVE 6 OR MORE DRINKS ON ONE OCCASION: NEVER
AUDIT-C TOTAL SCORE: 0
HAVE PEOPLE ANNOYED YOU BY CRITICIZING YOUR DRINKING: NO
EVER HAD A DRINK FIRST THING IN THE MORNING TO STEADY YOUR NERVES TO GET RID OF A HANGOVER: NO
TOTAL SCORE: 0
HOW OFTEN DO YOU HAVE A DRINK CONTAINING ALCOHOL: NEVER
HOW MANY STANDARD DRINKS CONTAINING ALCOHOL DO YOU HAVE ON A TYPICAL DAY: PATIENT DOES NOT DRINK
EVER FELT BAD OR GUILTY ABOUT YOUR DRINKING: NO

## 2025-08-13 ASSESSMENT — PATIENT HEALTH QUESTIONNAIRE - PHQ9
1. LITTLE INTEREST OR PLEASURE IN DOING THINGS: NOT AT ALL
SUM OF ALL RESPONSES TO PHQ9 QUESTIONS 1 & 2: 0
2. FEELING DOWN, DEPRESSED OR HOPELESS: NOT AT ALL

## 2025-08-13 ASSESSMENT — PAIN - FUNCTIONAL ASSESSMENT: PAIN_FUNCTIONAL_ASSESSMENT: 0-10

## 2025-08-14 ENCOUNTER — APPOINTMENT (OUTPATIENT)
Dept: CARDIOLOGY | Facility: HOSPITAL | Age: 85
End: 2025-08-14
Payer: MEDICARE

## 2025-08-14 ENCOUNTER — HOME HEALTH ADMISSION (OUTPATIENT)
Dept: HOME HEALTH SERVICES | Facility: HOME HEALTH | Age: 85
End: 2025-08-14
Payer: MEDICARE

## 2025-08-14 ENCOUNTER — DOCUMENTATION (OUTPATIENT)
Dept: HOME HEALTH SERVICES | Facility: HOME HEALTH | Age: 85
End: 2025-08-14

## 2025-08-14 VITALS
WEIGHT: 125 LBS | SYSTOLIC BLOOD PRESSURE: 157 MMHG | OXYGEN SATURATION: 92 % | HEART RATE: 74 BPM | DIASTOLIC BLOOD PRESSURE: 68 MMHG | BODY MASS INDEX: 26.97 KG/M2 | TEMPERATURE: 98.6 F | HEIGHT: 57 IN | RESPIRATION RATE: 16 BRPM

## 2025-08-14 LAB
ANION GAP SERPL CALC-SCNC: 9 MMOL/L (ref 10–20)
AORTIC VALVE MEAN GRADIENT: 9 MMHG
AORTIC VALVE PEAK VELOCITY: 2.09 M/S
AV PEAK GRADIENT: 17 MMHG
BUN SERPL-MCNC: 11 MG/DL (ref 6–23)
CALCIUM SERPL-MCNC: 8.6 MG/DL (ref 8.6–10.3)
CARDIAC TROPONIN I PNL SERPL HS: 86 NG/L (ref 0–13)
CHLORIDE SERPL-SCNC: 105 MMOL/L (ref 98–107)
CO2 SERPL-SCNC: 28 MMOL/L (ref 21–32)
CREAT SERPL-MCNC: 0.62 MG/DL (ref 0.5–1.05)
EGFRCR SERPLBLD CKD-EPI 2021: 87 ML/MIN/1.73M*2
EJECTION FRACTION APICAL 4 CHAMBER: 80.9
EJECTION FRACTION: 75 %
ERYTHROCYTE [DISTWIDTH] IN BLOOD BY AUTOMATED COUNT: 18.1 % (ref 11.5–14.5)
EST. AVERAGE GLUCOSE BLD GHB EST-MCNC: 235 MG/DL
GLUCOSE BLD MANUAL STRIP-MCNC: 117 MG/DL (ref 74–99)
GLUCOSE BLD MANUAL STRIP-MCNC: 169 MG/DL (ref 74–99)
GLUCOSE SERPL-MCNC: 134 MG/DL (ref 74–99)
HBA1C MFR BLD: 9.8 % (ref ?–5.7)
HCT VFR BLD AUTO: 30.8 % (ref 36–46)
HGB BLD-MCNC: 9.6 G/DL (ref 12–16)
HOLD SPECIMEN: NORMAL
LV EJECTION FRACTION BIPLANE: 73 %
MCH RBC QN AUTO: 28.5 PG (ref 26–34)
MCHC RBC AUTO-ENTMCNC: 31.2 G/DL (ref 32–36)
MCV RBC AUTO: 91 FL (ref 80–100)
MITRAL VALVE E/A RATIO: 0.77
NRBC BLD-RTO: 0 /100 WBCS (ref 0–0)
PLATELET # BLD AUTO: 127 X10*3/UL (ref 150–450)
POTASSIUM SERPL-SCNC: 3.5 MMOL/L (ref 3.5–5.3)
RBC # BLD AUTO: 3.37 X10*6/UL (ref 4–5.2)
RIGHT VENTRICLE PEAK SYSTOLIC PRESSURE: 44 MMHG
SARS-COV-2 RNA RESP QL NAA+PROBE: DETECTED
SODIUM SERPL-SCNC: 138 MMOL/L (ref 136–145)
TRICUSPID ANNULAR PLANE SYSTOLIC EXCURSION: 2 CM
WBC # BLD AUTO: 6.4 X10*3/UL (ref 4.4–11.3)

## 2025-08-14 PROCEDURE — 84484 ASSAY OF TROPONIN QUANT: CPT | Performed by: STUDENT IN AN ORGANIZED HEALTH CARE EDUCATION/TRAINING PROGRAM

## 2025-08-14 PROCEDURE — 99223 1ST HOSP IP/OBS HIGH 75: CPT | Performed by: INTERNAL MEDICINE

## 2025-08-14 PROCEDURE — 99239 HOSP IP/OBS DSCHRG MGMT >30: CPT | Performed by: STUDENT IN AN ORGANIZED HEALTH CARE EDUCATION/TRAINING PROGRAM

## 2025-08-14 PROCEDURE — 97165 OT EVAL LOW COMPLEX 30 MIN: CPT | Mod: GO

## 2025-08-14 PROCEDURE — 82947 ASSAY GLUCOSE BLOOD QUANT: CPT

## 2025-08-14 PROCEDURE — 93005 ELECTROCARDIOGRAM TRACING: CPT

## 2025-08-14 PROCEDURE — 2500000001 HC RX 250 WO HCPCS SELF ADMINISTERED DRUGS (ALT 637 FOR MEDICARE OP): Performed by: STUDENT IN AN ORGANIZED HEALTH CARE EDUCATION/TRAINING PROGRAM

## 2025-08-14 PROCEDURE — 85027 COMPLETE CBC AUTOMATED: CPT | Performed by: STUDENT IN AN ORGANIZED HEALTH CARE EDUCATION/TRAINING PROGRAM

## 2025-08-14 PROCEDURE — 93325 DOPPLER ECHO COLOR FLOW MAPG: CPT

## 2025-08-14 PROCEDURE — 80048 BASIC METABOLIC PNL TOTAL CA: CPT | Performed by: STUDENT IN AN ORGANIZED HEALTH CARE EDUCATION/TRAINING PROGRAM

## 2025-08-14 PROCEDURE — 93321 DOPPLER ECHO F-UP/LMTD STD: CPT | Performed by: INTERNAL MEDICINE

## 2025-08-14 PROCEDURE — 2500000004 HC RX 250 GENERAL PHARMACY W/ HCPCS (ALT 636 FOR OP/ED): Performed by: STUDENT IN AN ORGANIZED HEALTH CARE EDUCATION/TRAINING PROGRAM

## 2025-08-14 PROCEDURE — 93325 DOPPLER ECHO COLOR FLOW MAPG: CPT | Performed by: INTERNAL MEDICINE

## 2025-08-14 PROCEDURE — 97161 PT EVAL LOW COMPLEX 20 MIN: CPT | Mod: GP | Performed by: PHYSICAL THERAPIST

## 2025-08-14 PROCEDURE — 2500000004 HC RX 250 GENERAL PHARMACY W/ HCPCS (ALT 636 FOR OP/ED): Mod: JW | Performed by: STUDENT IN AN ORGANIZED HEALTH CARE EDUCATION/TRAINING PROGRAM

## 2025-08-14 PROCEDURE — 36415 COLL VENOUS BLD VENIPUNCTURE: CPT | Performed by: STUDENT IN AN ORGANIZED HEALTH CARE EDUCATION/TRAINING PROGRAM

## 2025-08-14 PROCEDURE — 93010 ELECTROCARDIOGRAM REPORT: CPT | Performed by: INTERNAL MEDICINE

## 2025-08-14 PROCEDURE — 93308 TTE F-UP OR LMTD: CPT | Performed by: INTERNAL MEDICINE

## 2025-08-14 RX ADMIN — POLYETHYLENE GLYCOL 3350 17 G: 17 POWDER, FOR SOLUTION ORAL at 09:00

## 2025-08-14 RX ADMIN — ASPIRIN 81 MG: 81 TABLET, CHEWABLE ORAL at 09:00

## 2025-08-14 RX ADMIN — GUAIFENESIN 600 MG: 600 TABLET, EXTENDED RELEASE ORAL at 09:00

## 2025-08-14 RX ADMIN — PERFLUTREN 2 ML OF DILUTION: 6.52 INJECTION, SUSPENSION INTRAVENOUS at 08:10

## 2025-08-14 RX ADMIN — METOPROLOL SUCCINATE 50 MG: 50 TABLET, EXTENDED RELEASE ORAL at 09:00

## 2025-08-14 RX ADMIN — LISINOPRIL 10 MG: 10 TABLET ORAL at 09:00

## 2025-08-14 ASSESSMENT — PAIN SCALES - GENERAL
PAINLEVEL_OUTOF10: 0 - NO PAIN

## 2025-08-14 ASSESSMENT — COGNITIVE AND FUNCTIONAL STATUS - GENERAL
CLIMB 3 TO 5 STEPS WITH RAILING: A LITTLE
MOVING TO AND FROM BED TO CHAIR: A LITTLE
MOBILITY SCORE: 20
DAILY ACTIVITIY SCORE: 20
HELP NEEDED FOR BATHING: A LITTLE
PERSONAL GROOMING: A LITTLE
WALKING IN HOSPITAL ROOM: A LITTLE
DRESSING REGULAR LOWER BODY CLOTHING: A LITTLE
STANDING UP FROM CHAIR USING ARMS: A LITTLE
TOILETING: A LITTLE

## 2025-08-14 ASSESSMENT — PAIN - FUNCTIONAL ASSESSMENT
PAIN_FUNCTIONAL_ASSESSMENT: 0-10

## 2025-08-14 ASSESSMENT — ACTIVITIES OF DAILY LIVING (ADL): BATHING_ASSISTANCE: MINIMAL

## 2025-08-15 LAB
ATRIAL RATE: 89 BPM
HOLD SPECIMEN: NORMAL
P AXIS: 52 DEGREES
P OFFSET: 179 MS
P ONSET: 109 MS
PR INTERVAL: 210 MS
Q ONSET: 214 MS
QRS COUNT: 14 BEATS
QRS DURATION: 134 MS
QT INTERVAL: 408 MS
QTC CALCULATION(BAZETT): 496 MS
QTC FREDERICIA: 465 MS
R AXIS: -58 DEGREES
T AXIS: 95 DEGREES
T OFFSET: 418 MS
VENTRICULAR RATE: 89 BPM

## 2025-08-18 ENCOUNTER — HOME CARE VISIT (OUTPATIENT)
Dept: HOME HEALTH SERVICES | Facility: HOME HEALTH | Age: 85
End: 2025-08-18
Payer: MEDICARE

## 2025-08-18 ENCOUNTER — PATIENT OUTREACH (OUTPATIENT)
Dept: CARE COORDINATION | Age: 85
End: 2025-08-18
Payer: MEDICARE

## 2025-08-18 VITALS
TEMPERATURE: 98.3 F | RESPIRATION RATE: 18 BRPM | DIASTOLIC BLOOD PRESSURE: 76 MMHG | HEART RATE: 63 BPM | OXYGEN SATURATION: 95 % | SYSTOLIC BLOOD PRESSURE: 124 MMHG

## 2025-08-18 DIAGNOSIS — R69 MULTIPLE COMORBID CONDITIONS: ICD-10-CM

## 2025-08-18 PROCEDURE — G0299 HHS/HOSPICE OF RN EA 15 MIN: HCPCS | Mod: HHH

## 2025-08-18 PROCEDURE — 169592 NO-PAY CLAIM PROCEDURE

## 2025-08-19 ENCOUNTER — HOME CARE VISIT (OUTPATIENT)
Dept: HOME HEALTH SERVICES | Facility: HOME HEALTH | Age: 85
End: 2025-08-19
Payer: MEDICARE

## 2025-08-19 ENCOUNTER — PATIENT OUTREACH (OUTPATIENT)
Dept: CARE COORDINATION | Age: 85
End: 2025-08-19
Payer: MEDICARE

## 2025-08-19 VITALS — TEMPERATURE: 97.9 F | OXYGEN SATURATION: 97 % | HEART RATE: 78 BPM

## 2025-08-19 PROCEDURE — G0151 HHCP-SERV OF PT,EA 15 MIN: HCPCS | Mod: HHH

## 2025-08-19 SDOH — HEALTH STABILITY: PHYSICAL HEALTH: PHYSICAL EXERCISE: SEATED

## 2025-08-19 SDOH — HEALTH STABILITY: PHYSICAL HEALTH: EXERCISE ACTIVITY: KNEE FLEXION

## 2025-08-19 SDOH — HEALTH STABILITY: PHYSICAL HEALTH: EXERCISE ACTIVITY: HIP FLEXION

## 2025-08-19 SDOH — HEALTH STABILITY: PHYSICAL HEALTH: EXERCISE ACTIVITY: KNEE EXTENSION

## 2025-08-19 SDOH — HEALTH STABILITY: PHYSICAL HEALTH: EXERCISE TYPE: INSTRUCTED AND DEMONSTRATED SUP INDEP SEATED THER EX PROGRAM

## 2025-08-19 SDOH — HEALTH STABILITY: PHYSICAL HEALTH: EXERCISE ACTIVITY: SIT TO STAND

## 2025-08-19 SDOH — HEALTH STABILITY: PHYSICAL HEALTH: EXERCISE ACTIVITY: ANKLE PUMPS

## 2025-08-19 SDOH — HEALTH STABILITY: PHYSICAL HEALTH: EXERCISE ACTIVITY: HIP ABD/ADDUCTION

## 2025-08-19 ASSESSMENT — ENCOUNTER SYMPTOMS
PAIN LOCATION - PAIN QUALITY: ACHING, SHOOTING
SLEEP QUALITY: ADEQUATE
PAIN LOCATION - PAIN FREQUENCY: FREQUENT
PAIN SEVERITY GOAL: 0/10
PERSON REPORTING PAIN: PATIENT
PAIN LOCATION - PAIN SEVERITY: 3/10
SUBJECTIVE PAIN PROGRESSION: UNCHANGED
HIGHEST PAIN SEVERITY IN PAST 24 HOURS: 10/10
PAIN LOCATION - PAIN SEVERITY: 3/10
AGGRESSION WITHIN DEFINED LIMITS: 1
ANGER WITHIN DEFINED LIMITS: 1
HIGHEST PAIN SEVERITY IN PAST 24 HOURS: 5/10
FORGETFULNESS: 1
PAIN LOCATION - PAIN SEVERITY: 9/10
PAIN LOCATION: LEFT LEG
LOWEST PAIN SEVERITY IN PAST 24 HOURS: 1/10
PAIN SEVERITY GOAL: 4/10
PAIN: 1
PAIN LOCATION: BACK
LOWEST PAIN SEVERITY IN PAST 24 HOURS: 0/10
PERSON REPORTING PAIN: PATIENT
DESCRIPTION OF MEMORY LOSS: SHORT TERM
OCCASIONAL FEELINGS OF UNSTEADINESS: 1
APPETITE LEVEL: GOOD
MUSCLE WEAKNESS: 1
PAIN LOCATION: RIGHT LEG
PAIN: 1

## 2025-08-19 ASSESSMENT — ACTIVITIES OF DAILY LIVING (ADL)
AMBULATION ASSISTANCE ON FLAT SURFACES: 1
ENTERING_EXITING_HOME: SUPERVISION
OASIS_M1830: 05

## 2025-08-20 ENCOUNTER — PATIENT OUTREACH (OUTPATIENT)
Dept: CARE COORDINATION | Age: 85
End: 2025-08-20
Payer: MEDICARE

## 2025-08-21 ENCOUNTER — HOME CARE VISIT (OUTPATIENT)
Dept: HOME HEALTH SERVICES | Facility: HOME HEALTH | Age: 85
End: 2025-08-21
Payer: MEDICARE

## 2025-08-21 PROCEDURE — G0152 HHCP-SERV OF OT,EA 15 MIN: HCPCS | Mod: HHH

## 2025-08-21 SDOH — ECONOMIC STABILITY: HOUSING INSECURITY
HOME SAFETY: PATIENT LIVES IN 3RD FLOOR APARTMENT WITH ELEVATOR WITH SIGNIFICANT OTHER. DAUGHTER OFTEN PRESENT TO ASSIST AS NEEDED.

## 2025-08-21 ASSESSMENT — ACTIVITIES OF DAILY LIVING (ADL)
AMBULATION ASSISTANCE: SUPERVISION
TOILETING: 1
LAUNDRY: DEPENDENT
GROOMING EQUIPMENT USED: SETUP
GROOMING ASSESSED: 1
LIGHT HOUSEKEEPING: NEEDS ASSISTANCE
BATHING ASSESSED: 1
AMBULATION ASSISTANCE: 1
GROOMING_CURRENT_FUNCTION: SUPERVISION
HOUSEKEEPING ASSESSED: 1
DRESSING_LB_CURRENT_FUNCTION: SUPERVISION
LAUNDRY ASSESSED: 1
PREPARING MEALS: NEEDS ASSISTANCE
DRESSING_UB_CURRENT_FUNCTION: SUPERVISION
TOILETING: SUPERVISION
BATHING_CURRENT_FUNCTION: SUPERVISION

## 2025-08-21 ASSESSMENT — ENCOUNTER SYMPTOMS
DENIES PAIN: 1
PERSON REPORTING PAIN: PATIENT

## 2025-08-22 ENCOUNTER — HOME CARE VISIT (OUTPATIENT)
Dept: HOME HEALTH SERVICES | Facility: HOME HEALTH | Age: 85
End: 2025-08-22
Payer: MEDICARE

## 2025-08-22 VITALS — SYSTOLIC BLOOD PRESSURE: 112 MMHG | DIASTOLIC BLOOD PRESSURE: 60 MMHG | OXYGEN SATURATION: 96 %

## 2025-08-22 PROCEDURE — G0157 HHC PT ASSISTANT EA 15: HCPCS | Mod: CQ,HHH

## 2025-08-22 SDOH — HEALTH STABILITY: PHYSICAL HEALTH
EXERCISE COMMENTS: COMPLETED STRENGTH TRAINING WITH STANDING HEEL RAISE, MARCHES, ALTERNATING KNEE FLEXION, HIP ABDUCTION, SIDE STEPPING, HIP EXTENSION WITH INSTRUCTIONS/DEMONSTRATION FOR ALIGNMENT AND TECHNIQUE, VOCALIZATION OF REPS TO IMPROVE ACTIVE VENTILATION WITH

## 2025-08-22 SDOH — HEALTH STABILITY: PHYSICAL HEALTH: EXERCISE COMMENTS: ACTIVITY 15X.

## 2025-08-26 ENCOUNTER — HOME CARE VISIT (OUTPATIENT)
Dept: HOME HEALTH SERVICES | Facility: HOME HEALTH | Age: 85
End: 2025-08-26
Payer: MEDICARE

## 2025-08-26 VITALS
SYSTOLIC BLOOD PRESSURE: 132 MMHG | DIASTOLIC BLOOD PRESSURE: 76 MMHG | HEART RATE: 69 BPM | TEMPERATURE: 98.4 F | OXYGEN SATURATION: 96 % | RESPIRATION RATE: 20 BRPM

## 2025-08-26 PROCEDURE — G0299 HHS/HOSPICE OF RN EA 15 MIN: HCPCS | Mod: HHH

## 2025-08-26 SDOH — ECONOMIC STABILITY: GENERAL

## 2025-08-26 ASSESSMENT — ENCOUNTER SYMPTOMS
PERSON REPORTING PAIN: PATIENT
LAST BOWEL MOVEMENT: 67443
DENIES PAIN: 1
APPETITE LEVEL: FAIR
CHANGE IN APPETITE: UNCHANGED

## 2025-08-26 ASSESSMENT — ACTIVITIES OF DAILY LIVING (ADL)
PHYSICAL TRANSFERS ASSESSED: 1
MONEY MANAGEMENT (EXPENSES/BILLS): NEEDS ASSISTANCE
CURRENT_FUNCTION: STAND BY ASSIST
AMBULATION ASSISTANCE: STAND BY ASSIST
AMBULATION ASSISTANCE: 1

## 2025-08-27 ENCOUNTER — HOME CARE VISIT (OUTPATIENT)
Dept: HOME HEALTH SERVICES | Facility: HOME HEALTH | Age: 85
End: 2025-08-27
Payer: MEDICARE

## 2025-08-27 VITALS — HEART RATE: 78 BPM | TEMPERATURE: 97.8 F | OXYGEN SATURATION: 98 %

## 2025-08-27 PROCEDURE — G0157 HHC PT ASSISTANT EA 15: HCPCS | Mod: CQ,HHH

## 2025-08-28 SDOH — HEALTH STABILITY: PHYSICAL HEALTH
EXERCISE COMMENTS: STRENGTH TRAINING STANDING HEEL RAISE, MARCHES, ALTERNATING KNEE FLEXION, MINI-SQUATS, SIDE STEPPING WITH REINFORCEMENT FOR ALIGNMENT AND PACING, VOCALIZATION OF REPS 10X2 SETS.

## 2025-08-29 ENCOUNTER — HOME CARE VISIT (OUTPATIENT)
Dept: HOME HEALTH SERVICES | Facility: HOME HEALTH | Age: 85
End: 2025-08-29
Payer: MEDICARE

## 2025-08-29 VITALS — DIASTOLIC BLOOD PRESSURE: 68 MMHG | SYSTOLIC BLOOD PRESSURE: 128 MMHG | OXYGEN SATURATION: 98 %

## 2025-08-29 PROCEDURE — G0157 HHC PT ASSISTANT EA 15: HCPCS | Mod: CQ,HHH

## 2025-09-01 ENCOUNTER — HOME CARE VISIT (OUTPATIENT)
Dept: HOME HEALTH SERVICES | Facility: HOME HEALTH | Age: 85
End: 2025-09-01
Payer: MEDICARE

## 2025-09-01 VITALS
OXYGEN SATURATION: 100 % | RESPIRATION RATE: 16 BRPM | TEMPERATURE: 98.3 F | SYSTOLIC BLOOD PRESSURE: 120 MMHG | HEART RATE: 73 BPM | DIASTOLIC BLOOD PRESSURE: 60 MMHG

## 2025-09-01 PROCEDURE — G0299 HHS/HOSPICE OF RN EA 15 MIN: HCPCS | Mod: HHH

## 2025-09-01 ASSESSMENT — ENCOUNTER SYMPTOMS
FORGETFULNESS: 1
CHANGE IN APPETITE: UNCHANGED
DESCRIPTION OF MEMORY LOSS: SHORT TERM
APPETITE LEVEL: GOOD
MUSCLE WEAKNESS: 1
DENIES PAIN: 1

## 2025-09-03 ENCOUNTER — HOME CARE VISIT (OUTPATIENT)
Dept: HOME HEALTH SERVICES | Facility: HOME HEALTH | Age: 85
End: 2025-09-03
Payer: MEDICARE

## 2025-09-03 VITALS — OXYGEN SATURATION: 90 % | HEART RATE: 90 BPM

## 2025-09-03 PROCEDURE — G0157 HHC PT ASSISTANT EA 15: HCPCS | Mod: CQ,HHH

## 2025-09-04 ENCOUNTER — APPOINTMENT (OUTPATIENT)
Dept: CARDIOLOGY | Facility: CLINIC | Age: 85
End: 2025-09-04
Payer: MEDICARE

## 2025-09-04 SDOH — HEALTH STABILITY: PHYSICAL HEALTH
EXERCISE COMMENTS: COMPLETED STRENGTH TRAINING WITH STANDING STEP TOUCHES AT FIRST STEP TO UPPER LEVEL, MINI-SQUATS, HIP ABDUCTION, ALTERNATING KNEE FLEXION, HIP EXTENSION WITH FOCUS ON FORWARD VISION AND MAINTAINING COUNTING REPS OUT LOUD 10X.

## 2025-09-04 ASSESSMENT — ENCOUNTER SYMPTOMS
DENIES PAIN: 1
PERSON REPORTING PAIN: PATIENT

## 2025-09-05 ENCOUNTER — HOME CARE VISIT (OUTPATIENT)
Dept: HOME HEALTH SERVICES | Facility: HOME HEALTH | Age: 85
End: 2025-09-05
Payer: MEDICARE

## 2025-09-05 VITALS — OXYGEN SATURATION: 92 % | SYSTOLIC BLOOD PRESSURE: 96 MMHG | DIASTOLIC BLOOD PRESSURE: 48 MMHG | HEART RATE: 84 BPM

## 2025-09-05 PROCEDURE — G0157 HHC PT ASSISTANT EA 15: HCPCS | Mod: CQ,HHH

## 2025-09-05 SDOH — HEALTH STABILITY: PHYSICAL HEALTH
EXERCISE COMMENTS: STRENGTH TRAINING STANDING EXERCISES HEEL RAISE, MARCHES, HIP ABDUCTION, EXTENSION WITH FOCUS ON CONSISTENT TECHNIQUE, STAYING ON TASK, MAINTAINING FORWARD VISION AND VOCALIZATION OF REPS 15X.

## 2025-09-05 ASSESSMENT — ENCOUNTER SYMPTOMS
DENIES PAIN: 1
PERSON REPORTING PAIN: PATIENT

## 2025-09-08 ENCOUNTER — APPOINTMENT (OUTPATIENT)
Dept: PRIMARY CARE | Facility: CLINIC | Age: 85
End: 2025-09-08
Payer: MEDICARE

## 2025-09-15 ENCOUNTER — APPOINTMENT (OUTPATIENT)
Dept: PRIMARY CARE | Facility: CLINIC | Age: 85
End: 2025-09-15
Payer: MEDICARE

## 2025-10-20 ENCOUNTER — APPOINTMENT (OUTPATIENT)
Dept: PRIMARY CARE | Facility: CLINIC | Age: 85
End: 2025-10-20
Payer: MEDICARE

## 2026-03-17 ENCOUNTER — APPOINTMENT (OUTPATIENT)
Dept: CARDIOLOGY | Facility: CLINIC | Age: 86
End: 2026-03-17
Payer: MEDICARE

## (undated) DEVICE — PATIENT RETURN ELECTRODE, SINGLE-USE, NON CONTACT QUALITY MONITORING, ADULT, WITH 9 FT (2.7 M) CORD, FOR PATIENTS WEIGHING OVER 33LBS. (15KG): Brand: MEGADYNE

## (undated) DEVICE — CLEANER,CAUTERY TIP,2X2",STERILE: Brand: MEDLINE

## (undated) DEVICE — PACER PACK: Brand: MEDLINE INDUSTRIES, INC.

## (undated) DEVICE — INTRODUCER SHTH L13CM OD7FR SH ORNG HUB SEAMLESS SAFSHTH

## (undated) DEVICE — PENCIL ES L3M BTTN SWCH HOLSTER W/ BLDE ELECTRD EDGE

## (undated) DEVICE — LIQUIBAND RAPID ADHESIVE 36/CS 0.8ML: Brand: MEDLINE

## (undated) DEVICE — GLOVE SURG SZ 65 L12IN FNGR THK94MIL STD WHT LTX FREE

## (undated) DEVICE — 4FR MICROPUNCTURE KIT STIFFEN

## (undated) DEVICE — SUTURE PERMA-HAND SZ 0 L30IN NONABSORBABLE BLK L36MM CT-1 424H

## (undated) DEVICE — Device

## (undated) DEVICE — DRESSING HYDROFIBER AQUACEL AG ADVANTAGE 3.5X6 IN

## (undated) DEVICE — GLOVE ORANGE PI 7 1/2   MSG9075

## (undated) DEVICE — SUTURE VICRYL SZ 4-0 L18IN ABSRB UD L24MM PS-1 3/8 CIR PRIM J682H

## (undated) DEVICE — 8 FOOT DISPOSABLE EXTENSION CABLE WITH SAFE CONNECT / ALLIGATOR CLIP